# Patient Record
Sex: FEMALE | Race: BLACK OR AFRICAN AMERICAN | NOT HISPANIC OR LATINO | URBAN - METROPOLITAN AREA
[De-identification: names, ages, dates, MRNs, and addresses within clinical notes are randomized per-mention and may not be internally consistent; named-entity substitution may affect disease eponyms.]

---

## 2019-07-26 ENCOUNTER — EMERGENCY (EMERGENCY)
Facility: HOSPITAL | Age: 84
LOS: 0 days | Discharge: HOME | End: 2019-07-26
Attending: EMERGENCY MEDICINE
Payer: COMMERCIAL

## 2019-07-26 VITALS
OXYGEN SATURATION: 100 % | RESPIRATION RATE: 18 BRPM | SYSTOLIC BLOOD PRESSURE: 152 MMHG | HEART RATE: 52 BPM | DIASTOLIC BLOOD PRESSURE: 72 MMHG | WEIGHT: 110.01 LBS | TEMPERATURE: 99 F
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
## 2019-07-26 DIAGNOSIS — S00.93XA CONTUSION OF UNSPECIFIED PART OF HEAD, INITIAL ENCOUNTER: ICD-10-CM
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
## 2019-07-26 DIAGNOSIS — M25.562 PAIN IN LEFT KNEE: ICD-10-CM

## 2019-07-26 DIAGNOSIS — R51 HEADACHE: ICD-10-CM

## 2019-07-26 DIAGNOSIS — S80.02XA CONTUSION OF LEFT KNEE, INITIAL ENCOUNTER: ICD-10-CM

## 2019-07-26 DIAGNOSIS — M19.90 UNSPECIFIED OSTEOARTHRITIS, UNSPECIFIED SITE: ICD-10-CM

## 2019-07-26 PROBLEM — Z00.00 ENCOUNTER FOR PREVENTIVE HEALTH EXAMINATION: Status: ACTIVE | Noted: 2019-07-26

## 2019-07-26 LAB — GLUCOSE BLDC GLUCOMTR-MCNC: 93 MG/DL — SIGNIFICANT CHANGE UP (ref 70–99)

## 2019-07-26 PROCEDURE — 72125 CT NECK SPINE W/O DYE: CPT | Mod: 26

## 2019-07-26 PROCEDURE — 70450 CT HEAD/BRAIN W/O DYE: CPT | Mod: 26

## 2019-07-26 PROCEDURE — 76377 3D RENDER W/INTRP POSTPROCES: CPT | Mod: 26

## 2019-07-26 PROCEDURE — 73562 X-RAY EXAM OF KNEE 3: CPT | Mod: 26,LT

## 2019-07-26 PROCEDURE — 99284 EMERGENCY DEPT VISIT MOD MDM: CPT

## 2019-07-26 RX ORDER — ACETAMINOPHEN 500 MG
650 TABLET ORAL ONCE
Refills: 0 | Status: COMPLETED | OUTPATIENT
Start: 2019-07-26 | End: 2019-07-26

## 2019-07-26 RX ADMIN — Medication 650 MILLIGRAM(S): at 17:35

## 2019-07-26 RX ADMIN — Medication 650 MILLIGRAM(S): at 17:33

## 2019-07-26 NOTE — ED PROVIDER NOTE - OBJECTIVE STATEMENT
85 donna female wih history of athritis presents withleft knee pain and head pain while pushng shopping cart today. Patient denies neck pain, fever, chills, headaches

## 2019-07-26 NOTE — ED PROVIDER NOTE - CHPI ED SYMPTOMS NEG
no deformity/no weakness/no confusion/no loss of consciousness/no fever/no tingling/no bleeding/no numbness/no vomiting

## 2019-07-26 NOTE — ED ADULT TRIAGE NOTE - CHIEF COMPLAINT QUOTE
s/p mechanical fall outside, fell struck her head, AMS after falling, takes aspirin daily , C collar in place, Fall occurred 20 mins ago

## 2019-07-26 NOTE — ED ADULT NURSE NOTE - NSIMPLEMENTINTERV_GEN_ALL_ED
Implemented All Fall with Harm Risk Interventions:  [place] to call system. Call bell, personal items and telephone within reach. Instruct patient to call for assistance. Room bathroom lighting operational. Non-slip footwear when patient is off stretcher. Physically safe environment: no spills, clutter or unnecessary equipment. Stretcher in lowest position, wheels locked, appropriate side rails in place. Provide visual cue, wrist band, yellow gown, etc. Monitor gait and stability. Monitor for mental status changes and reorient to person, place, and time. Review medications for side effects contributing to fall risk. Reinforce activity limits and safety measures with patient and family. Provide visual clues: red socks.

## 2022-07-14 ENCOUNTER — INPATIENT (INPATIENT)
Facility: HOSPITAL | Age: 87
LOS: 12 days | Discharge: NURSING, REHAB OR LONG-TERM CARE | DRG: 312 | End: 2022-07-27
Attending: GENERAL PRACTICE | Admitting: GENERAL PRACTICE
Payer: COMMERCIAL

## 2022-07-14 VITALS
SYSTOLIC BLOOD PRESSURE: 159 MMHG | HEART RATE: 56 BPM | WEIGHT: 110.01 LBS | OXYGEN SATURATION: 93 % | HEIGHT: 65 IN | RESPIRATION RATE: 16 BRPM | TEMPERATURE: 98 F | DIASTOLIC BLOOD PRESSURE: 81 MMHG
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
## 2022-07-14 DIAGNOSIS — I10 ESSENTIAL (PRIMARY) HYPERTENSION: ICD-10-CM

## 2022-07-14 DIAGNOSIS — R26.2 DIFFICULTY IN WALKING, NOT ELSEWHERE CLASSIFIED: ICD-10-CM

## 2022-07-14 DIAGNOSIS — U07.1 COVID-19: ICD-10-CM

## 2022-07-14 DIAGNOSIS — M25.552 PAIN IN LEFT HIP: ICD-10-CM

## 2022-07-14 DIAGNOSIS — H40.9 UNSPECIFIED GLAUCOMA: ICD-10-CM

## 2022-07-14 DIAGNOSIS — M25.562 PAIN IN LEFT KNEE: ICD-10-CM

## 2022-07-14 LAB
ALBUMIN SERPL ELPH-MCNC: 4 G/DL — SIGNIFICANT CHANGE UP (ref 3.3–5)
ALP SERPL-CCNC: 58 U/L — SIGNIFICANT CHANGE UP (ref 40–120)
ALT FLD-CCNC: 7 U/L — LOW (ref 10–45)
ANION GAP SERPL CALC-SCNC: 12 MMOL/L — SIGNIFICANT CHANGE UP (ref 5–17)
APPEARANCE UR: CLEAR — SIGNIFICANT CHANGE UP
APTT BLD: 30.1 SEC — SIGNIFICANT CHANGE UP (ref 27.5–35.5)
AST SERPL-CCNC: 14 U/L — SIGNIFICANT CHANGE UP (ref 10–40)
BACTERIA # UR AUTO: NEGATIVE — SIGNIFICANT CHANGE UP
BASOPHILS # BLD AUTO: 0.05 K/UL — SIGNIFICANT CHANGE UP (ref 0–0.2)
BASOPHILS NFR BLD AUTO: 0.8 % — SIGNIFICANT CHANGE UP (ref 0–2)
BILIRUB SERPL-MCNC: 0.4 MG/DL — SIGNIFICANT CHANGE UP (ref 0.2–1.2)
BILIRUB UR-MCNC: NEGATIVE — SIGNIFICANT CHANGE UP
BLD GP AB SCN SERPL QL: NEGATIVE — SIGNIFICANT CHANGE UP
BUN SERPL-MCNC: 11 MG/DL — SIGNIFICANT CHANGE UP (ref 7–23)
CALCIUM SERPL-MCNC: 10.4 MG/DL — SIGNIFICANT CHANGE UP (ref 8.4–10.5)
CHLORIDE SERPL-SCNC: 98 MMOL/L — SIGNIFICANT CHANGE UP (ref 96–108)
CO2 SERPL-SCNC: 31 MMOL/L — SIGNIFICANT CHANGE UP (ref 22–31)
COLOR SPEC: SIGNIFICANT CHANGE UP
CREAT SERPL-MCNC: 0.87 MG/DL — SIGNIFICANT CHANGE UP (ref 0.5–1.3)
DIFF PNL FLD: NEGATIVE — SIGNIFICANT CHANGE UP
EGFR: 64 ML/MIN/1.73M2 — SIGNIFICANT CHANGE UP
EOSINOPHIL # BLD AUTO: 0.14 K/UL — SIGNIFICANT CHANGE UP (ref 0–0.5)
EOSINOPHIL NFR BLD AUTO: 2.2 % — SIGNIFICANT CHANGE UP (ref 0–6)
EPI CELLS # UR: 0 /HPF — SIGNIFICANT CHANGE UP
GLUCOSE SERPL-MCNC: 104 MG/DL — HIGH (ref 70–99)
GLUCOSE UR QL: NEGATIVE — SIGNIFICANT CHANGE UP
HCT VFR BLD CALC: 45.2 % — HIGH (ref 34.5–45)
HGB BLD-MCNC: 14.2 G/DL — SIGNIFICANT CHANGE UP (ref 11.5–15.5)
IMM GRANULOCYTES NFR BLD AUTO: 0.6 % — SIGNIFICANT CHANGE UP (ref 0–1.5)
INR BLD: 1.04 RATIO — SIGNIFICANT CHANGE UP (ref 0.88–1.16)
KETONES UR-MCNC: NEGATIVE — SIGNIFICANT CHANGE UP
LEUKOCYTE ESTERASE UR-ACNC: NEGATIVE — SIGNIFICANT CHANGE UP
LYMPHOCYTES # BLD AUTO: 1.4 K/UL — SIGNIFICANT CHANGE UP (ref 1–3.3)
LYMPHOCYTES # BLD AUTO: 21.7 % — SIGNIFICANT CHANGE UP (ref 13–44)
MCHC RBC-ENTMCNC: 27.4 PG — SIGNIFICANT CHANGE UP (ref 27–34)
MCHC RBC-ENTMCNC: 31.4 GM/DL — LOW (ref 32–36)
MCV RBC AUTO: 87.1 FL — SIGNIFICANT CHANGE UP (ref 80–100)
MONOCYTES # BLD AUTO: 0.8 K/UL — SIGNIFICANT CHANGE UP (ref 0–0.9)
MONOCYTES NFR BLD AUTO: 12.4 % — SIGNIFICANT CHANGE UP (ref 2–14)
NEUTROPHILS # BLD AUTO: 4.01 K/UL — SIGNIFICANT CHANGE UP (ref 1.8–7.4)
NEUTROPHILS NFR BLD AUTO: 62.3 % — SIGNIFICANT CHANGE UP (ref 43–77)
NITRITE UR-MCNC: NEGATIVE — SIGNIFICANT CHANGE UP
NRBC # BLD: 0 /100 WBCS — SIGNIFICANT CHANGE UP (ref 0–0)
PH UR: 8 — SIGNIFICANT CHANGE UP (ref 5–8)
PLATELET # BLD AUTO: 248 K/UL — SIGNIFICANT CHANGE UP (ref 150–400)
POTASSIUM SERPL-MCNC: 3.9 MMOL/L — SIGNIFICANT CHANGE UP (ref 3.5–5.3)
POTASSIUM SERPL-SCNC: 3.9 MMOL/L — SIGNIFICANT CHANGE UP (ref 3.5–5.3)
PROT SERPL-MCNC: 8 G/DL — SIGNIFICANT CHANGE UP (ref 6–8.3)
PROT UR-MCNC: NEGATIVE — SIGNIFICANT CHANGE UP
PROTHROM AB SERPL-ACNC: 12 SEC — SIGNIFICANT CHANGE UP (ref 10.5–13.4)
RBC # BLD: 5.19 M/UL — SIGNIFICANT CHANGE UP (ref 3.8–5.2)
RBC # FLD: 13.3 % — SIGNIFICANT CHANGE UP (ref 10.3–14.5)
RBC CASTS # UR COMP ASSIST: 1 /HPF — SIGNIFICANT CHANGE UP (ref 0–4)
RH IG SCN BLD-IMP: POSITIVE — SIGNIFICANT CHANGE UP
SARS-COV-2 RNA SPEC QL NAA+PROBE: DETECTED
SODIUM SERPL-SCNC: 141 MMOL/L — SIGNIFICANT CHANGE UP (ref 135–145)
SP GR SPEC: 1.01 — SIGNIFICANT CHANGE UP (ref 1.01–1.02)
UROBILINOGEN FLD QL: NEGATIVE — SIGNIFICANT CHANGE UP
WBC # BLD: 6.44 K/UL — SIGNIFICANT CHANGE UP (ref 3.8–10.5)
WBC # FLD AUTO: 6.44 K/UL — SIGNIFICANT CHANGE UP (ref 3.8–10.5)
WBC UR QL: 0 /HPF — SIGNIFICANT CHANGE UP (ref 0–5)

## 2022-07-14 PROCEDURE — G1004: CPT

## 2022-07-14 PROCEDURE — 71250 CT THORAX DX C-: CPT | Mod: 26,MG

## 2022-07-14 PROCEDURE — 70450 CT HEAD/BRAIN W/O DYE: CPT | Mod: 26,MG

## 2022-07-14 PROCEDURE — 99285 EMERGENCY DEPT VISIT HI MDM: CPT

## 2022-07-14 PROCEDURE — 73562 X-RAY EXAM OF KNEE 3: CPT | Mod: 26,LT

## 2022-07-14 PROCEDURE — 72125 CT NECK SPINE W/O DYE: CPT | Mod: 26,MG

## 2022-07-14 PROCEDURE — 74176 CT ABD & PELVIS W/O CONTRAST: CPT | Mod: 26,MG

## 2022-07-14 RX ORDER — ONDANSETRON 8 MG/1
4 TABLET, FILM COATED ORAL EVERY 8 HOURS
Refills: 0 | Status: DISCONTINUED | OUTPATIENT
Start: 2022-07-14 | End: 2022-07-27

## 2022-07-14 RX ORDER — ACETAMINOPHEN 500 MG
650 TABLET ORAL EVERY 6 HOURS
Refills: 0 | Status: DISCONTINUED | OUTPATIENT
Start: 2022-07-14 | End: 2022-07-27

## 2022-07-14 RX ORDER — LISINOPRIL 2.5 MG/1
40 TABLET ORAL DAILY
Refills: 0 | Status: DISCONTINUED | OUTPATIENT
Start: 2022-07-14 | End: 2022-07-27

## 2022-07-14 RX ORDER — DORZOLAMIDE HYDROCHLORIDE TIMOLOL MALEATE 20; 5 MG/ML; MG/ML
1 SOLUTION/ DROPS OPHTHALMIC
Refills: 0 | Status: DISCONTINUED | OUTPATIENT
Start: 2022-07-14 | End: 2022-07-27

## 2022-07-14 RX ORDER — MULTIVIT-MIN/FERROUS GLUCONATE 9 MG/15 ML
1 LIQUID (ML) ORAL DAILY
Refills: 0 | Status: DISCONTINUED | OUTPATIENT
Start: 2022-07-14 | End: 2022-07-27

## 2022-07-14 RX ORDER — LANOLIN ALCOHOL/MO/W.PET/CERES
3 CREAM (GRAM) TOPICAL AT BEDTIME
Refills: 0 | Status: DISCONTINUED | OUTPATIENT
Start: 2022-07-14 | End: 2022-07-27

## 2022-07-14 RX ORDER — LATANOPROST 0.05 MG/ML
1 SOLUTION/ DROPS OPHTHALMIC; TOPICAL AT BEDTIME
Refills: 0 | Status: DISCONTINUED | OUTPATIENT
Start: 2022-07-14 | End: 2022-07-27

## 2022-07-14 RX ORDER — AMLODIPINE BESYLATE 2.5 MG/1
10 TABLET ORAL DAILY
Refills: 0 | Status: DISCONTINUED | OUTPATIENT
Start: 2022-07-14 | End: 2022-07-27

## 2022-07-14 RX ORDER — SIMVASTATIN 20 MG/1
10 TABLET, FILM COATED ORAL AT BEDTIME
Refills: 0 | Status: DISCONTINUED | OUTPATIENT
Start: 2022-07-14 | End: 2022-07-27

## 2022-07-14 RX ORDER — BRIMONIDINE TARTRATE 2 MG/MG
1 SOLUTION/ DROPS OPHTHALMIC THREE TIMES A DAY
Refills: 0 | Status: DISCONTINUED | OUTPATIENT
Start: 2022-07-14 | End: 2022-07-27

## 2022-07-14 RX ORDER — MECLIZINE HCL 12.5 MG
25 TABLET ORAL ONCE
Refills: 0 | Status: COMPLETED | OUTPATIENT
Start: 2022-07-14 | End: 2022-07-14

## 2022-07-14 RX ORDER — LEVOTHYROXINE SODIUM 125 MCG
112 TABLET ORAL DAILY
Refills: 0 | Status: DISCONTINUED | OUTPATIENT
Start: 2022-07-14 | End: 2022-07-27

## 2022-07-14 RX ORDER — ACETAMINOPHEN 500 MG
975 TABLET ORAL ONCE
Refills: 0 | Status: COMPLETED | OUTPATIENT
Start: 2022-07-14 | End: 2022-07-14

## 2022-07-14 RX ADMIN — Medication 975 MILLIGRAM(S): at 18:52

## 2022-07-14 RX ADMIN — Medication 25 MILLIGRAM(S): at 18:52

## 2022-07-14 NOTE — H&P ADULT - PROBLEM SELECTOR PLAN 1
appears to be mechanical fall . imbalance  pt reportedly had some dizziness post >> ? seems resolved   no signs of fracture on imaging  PT eval  fall precautions  check orthostatics

## 2022-07-14 NOTE — ED PROVIDER NOTE - OBJECTIVE STATEMENT
88f pmh HTN, HLD, Hypothyroidism. Glaucoma, osteoarthritis presents to ed after mechanical trip and fall getting out of shower, pt denies loc denies syncope denies symptoms prior to slipping however after falling pt states she was dizzy, unable to get up for approx 20-30 minutes before ems arrived, pt unable to ambulates after falling not on ac.

## 2022-07-14 NOTE — ED ADULT NURSE REASSESSMENT NOTE - NS ED NURSE REASSESS COMMENT FT1
report taken from RN Galilea, pt assessed, vitals stable, no complaints at this time, daughter at bedside pending floor bed

## 2022-07-14 NOTE — ED PROVIDER NOTE - ATTENDING CONTRIBUTION TO CARE
Private Physician Vladislav Ramey [place], [facility]  88y female pmh HTN, HLD, Hypothyroidism. Glaucoma, osteoarthritis. Pt comes to ed c/o mechanical trip fall, Pt was walking w cane and was reaching for walker and fell. No loc, pt was not able to get up after fall. Pt c/o pain Private Physician Vladislav Ramey [place], [place]  88y female pmh HTN, HLD, Hypothyroidism. Glaucoma, osteoarthritis. Pt comes to ed c/o mechanical trip fall, Pt was walking w cane and was reaching for walker and fell. No loc, pt was not able to get up after fall. Pt has c/o chronic pain left knee. Heent normocephalic atraumatic neck c-collar, +occipial ttp, no sterling racoons, Chest clear anterior & posterior cv no rubs, gallops or murmurs abd soft +bs no mass guarding Neruo gcs 15 speech fluent moves all extr, MSK left knee pain on flexion  Hermes Henderson MD, Facep

## 2022-07-14 NOTE — ED PROVIDER NOTE - PHYSICAL EXAMINATION
CONSTITUTIONAL: nad  SKIN: 3cm hematoma w/o laceration or abrasion over R posterior scalp  HEAD: NCAT  EYES: EOMI, PERRLA, no scleral icterus, conjunctiva pink  ENT: normal pharynx with no erythema or exudates, dry  NECK: Supple; non tender. Full ROM. in c-collar  CARD: RRR, no murmurs.  RESP: Clear to ausculation b/l. No crackles or wheezing.  ABD: soft, non-tender, non-distended, no rebound or guarding.  MSK: L hip asymmetric vs R, Full ROM w/ nelida eL knee pain, no bony tenderness, no pedal edema, no calf tenderness  NEURO: normal motor. normal sensory.   PSYCH: Cooperative, appropriate.

## 2022-07-14 NOTE — H&P ADULT - PROBLEM SELECTOR PLAN 3
pt with recent positive in home test   pt not symptomatic ( has chronic on and off cough)  no additional testing / management needed  monitor  supportive care  nutrition, hydration  vitamins     infection control eval in AM for possible DC of isolation ?

## 2022-07-14 NOTE — H&P ADULT - NSHPADDITIONALINFOADULT_GEN_ALL_CORE
Dr. RYNE Thompson ([phone]) Dr. RYNE Thompson ([phone])  goals of care, CPR... discussed briefly, pt not sure at this time >> will follow at later time

## 2022-07-14 NOTE — H&P ADULT - PROBLEM SELECTOR PLAN 2
possible contusion post fall  if not better / unable to walk, may need additional imaging to rule out fracture  PT eval  pain mgmt as needed

## 2022-07-14 NOTE — ED PROVIDER NOTE - CLINICAL SUMMARY MEDICAL DECISION MAKING FREE TEXT BOX
mechanical fall not on ac but w/ head trauma r/o intracranial bleed w/ ct, r/o infectious precipitant causing weakness, r/o hip fracture w/ non-con CT head neck chest pelvis, likely admission if unable to ambulate

## 2022-07-14 NOTE — ED ADULT NURSE NOTE - OBJECTIVE STATEMENT
88 yr old female to ED via EMS from home s/p fall.  Neck collar on Denies neck pain Log rolled Denies back pain No obvious inj or sacral redness or breakdown Hematoma rt occipital area . Denies LOC Pt states" I was coming out of the BR and fell Has Life call device and EMS came x 30 mins later." Pt awake alert and orientedx4 Resp even and nonlab Denies chest pain or sob Coughing Covid pos x3 weeks ago Afebrile Denies abd pain Denies N/V C/o dizziness after falling BEFAST neg Denies numbness or weakness to extremities No facial droop or slurred speech GCS 15. Has HX HTN, HLD, Hypothyroid and Glaucoma. Responds approp to verbal and tactile stimuli Poor hygiene Lives with Grandaughter.

## 2022-07-14 NOTE — H&P ADULT - HISTORY OF PRESENT ILLNESS
>>>>>>Medical Attending Initial / Admission note<<<<<<  -------------------------------------------------------------------------------  CHIEF COMPLAINT & HISTORY OF PRESENT ILLNESS:      Patient is an 87 yo woman with past medical history of HTN, HLD, Hypothyroidism. Glaucoma, osteoarthritis presents to ed after mechanical trip and fall getting out of bathroom.  pt states she lives by herself but her grand children live with her.. she woke u p this morning, went to bathroom , voided and was going back to bedroom when she may have tripped, twisted and fell on the floor and could not get up..  pt denies loc denies syncope denies symptoms prior to slipping however after falling pt states she was dizzy, unable to get up for approx 20-30 minutes before ems arrived ( used 'life alert' ), pt unable to ambulates after falling due to pain in left leg  pt not dizzy at time of exam and c/o soreness to left arm and leg, states hurts when trying to walk on left leg  no fractures found  + small hematoma on Rt post scalp..  pt normally walks with bennett at home and walker outside the home     per report pt tested positive for covid on an in-home test last week  COVID-19 vaccine series completed w booster,  covid test positive here.. pt asymptomatic (except for chronic  ?smokers cough)     --------------------------------------------------------------------------------  PAST MEDICAL HISTORY:    HTN (hypertension)  Hypothyroidism  HLD (hyperlipidemia)  OA  Glaucoma    --------------------------------------------------------------------------------  PAST SURGICAL HISTORY:    denies     --------------------------------------------------------------------------------  FAMILY HISTORY:    Mother: pancreatic cancer  father: MI    --------------------------------------------------------------------------------  SOCIAL HISTORY:  Alcohol: None reported  Smoking: None reported     --------------------------------------------------------------------------------  ALLERGIES:    [As bellow, reviewed]    --------------------------------------------------------------------------------  MEDICATIONS:   [As geoffrey, reviewed]    --------------------------------------------------------------------------------  REVIEW OF SYSTEM:    GEN: no fever, no chills, as above   RESP: no SOB, occasional cough, no sputum  CVS: no chest pain, no palpitations, no edema  GI: no abdominal pain, no nausea, no vomiting  : no dysuria, no frequency  Neuro: no headache, no dizziness  PSYCH: no anxiety, no depression  Derm : no itching, no rash    --------------------------------------------------------------------------------  VITAL SIGNS:  Height (cm): 165.1  Weight (kg): 49.9  BMI (kg/m2): 18.3  Vital Signs Last 24 HrsT(C): 36.8 (07-14-22 @ 19:36)  T(F): 98.3 (07-14-22 @ 19:36), Max: 98.3 (07-14-22 @ 19:36)  HR: 52 (07-14-22 @ 19:36) (52 - 59)  BP: 146/64 (07-14-22 @ 19:36)  RR: 18 (07-14-22 @ 19:36) (16 - 18)  SpO2: 98% (07-14-22 @ 19:36) (93% - 98%)      --------------------------------------------------------------------------------    PHYSICAL EXAM:    GEN: A&O X 3 , NAD , comfortable, pleasant, calm, cachectic   HEENT: NCAT, PERRL, MMM, hearing intact  Neck: supple , no JVD  CVS: S1S2 , regular , No M/R/G appreciated  PULM: CTA B/L,  no W/R/R appreciated  ABD.: soft. non tender, non distended,  bowel sounds present  Extrem: intact pulses , no edema   Derm: No rash , no ecchymoses, dry skin   PSYCH : normal mood,  no delusion not anxious    --------------------------------------------------------------------------------  LAB AND IMAGING:   [As bellow, reviewed]    --------------------------------------------------------------------------------  ASSESSMENT AND PLAN:   [As bellow]    --------------------  Case discussed with SHY arevalo.   ___________________________  RYNE Thompson D.O.  Pager: [phone]  07-14-22   >>>>>>Medical Attending Initial / Admission note<<<<<<  -------------------------------------------------------------------------------  CHIEF COMPLAINT & HISTORY OF PRESENT ILLNESS:      Patient is an 87 yo woman with past medical history of HTN, HLD, Hypothyroidism. Glaucoma, osteoarthritis presents to ed after mechanical trip and fall getting out of bathroom.  pt states she lives by herself but her grand children live with her.. she woke u p this morning, went to bathroom , voided and was going back to bedroom when she may have tripped, twisted and fell on the floor and could not get up..  pt denies loc denies syncope denies symptoms prior to slipping however after falling pt states she was dizzy, unable to get up for approx 20-30 minutes before ems arrived ( used 'life alert' ), pt unable to ambulates after falling due to pain in left leg  pt not dizzy at time of exam and c/o soreness to left arm and leg, states hurts when trying to walk on left leg  no fractures found  + small hematoma on Rt post scalp..  pt normally walks with bennett at home and walker outside the home     per report pt tested positive for covid on an in-home test recently  COVID-19 vaccine series completed w booster,  covid test positive here.. pt asymptomatic (except for chronic  ?smokers cough)     --------------------------------------------------------------------------------  PAST MEDICAL HISTORY:    HTN (hypertension)  Hypothyroidism  HLD (hyperlipidemia)  OA  Glaucoma    --------------------------------------------------------------------------------  PAST SURGICAL HISTORY:    denies     --------------------------------------------------------------------------------  FAMILY HISTORY:    Mother: pancreatic cancer  father: MI    --------------------------------------------------------------------------------  SOCIAL HISTORY:  Alcohol: None reported  Smoking:  ++ past smoker     --------------------------------------------------------------------------------  ALLERGIES:    [As bellow, reviewed]    --------------------------------------------------------------------------------  MEDICATIONS:   [As bellow, reviewed]    --------------------------------------------------------------------------------  REVIEW OF SYSTEM:    GEN: no fever, no chills, as above   RESP: no SOB, occasional cough, no sputum  CVS: no chest pain, no palpitations, no edema  GI: no abdominal pain, no nausea, no vomiting  : no dysuria, no frequency  Neuro: no headache, no dizziness  PSYCH: no anxiety, no depression  Derm : no itching, no rash    --------------------------------------------------------------------------------  VITAL SIGNS:  Height (cm): 165.1  Weight (kg): 49.9  BMI (kg/m2): 18.3  Vital Signs Last 24 HrsT(C): 36.8 (07-14-22 @ 19:36)  T(F): 98.3 (07-14-22 @ 19:36), Max: 98.3 (07-14-22 @ 19:36)  HR: 52 (07-14-22 @ 19:36) (52 - 59)  BP: 146/64 (07-14-22 @ 19:36)  RR: 18 (07-14-22 @ 19:36) (16 - 18)  SpO2: 98% (07-14-22 @ 19:36) (93% - 98%)      --------------------------------------------------------------------------------    PHYSICAL EXAM:    GEN: A&O X 3 , NAD , comfortable, pleasant, calm, cachectic   HEENT: NCAT, PERRL, MMM, hearing intact  Neck: supple , no JVD  CVS: S1S2 , regular , No M/R/G appreciated  PULM: CTA B/L,  no W/R/R appreciated  ABD.: soft. non tender, non distended,  bowel sounds present  Extrem: intact pulses , no edema   Derm: No rash , no ecchymoses, dry skin   PSYCH : normal mood,  no delusion not anxious    --------------------------------------------------------------------------------  LAB AND IMAGING:   [As bellow, reviewed]    --------------------------------------------------------------------------------  ASSESSMENT AND PLAN:   [As bellow]    --------------------  Case discussed with SHY arevalo.   ___________________________  RYNE Thompson D.O.  Pager: [phone]  07-14-22

## 2022-07-14 NOTE — ED PROVIDER NOTE - PROGRESS NOTE DETAILS
Endorsed to Dr Jacquelyn Henderson MD, Facep Osvaldo Pierce, DO PGY-2: No acute fractures on CT, will clear C-collar w/ no neck pain, r/o infectious precipitant admit to hospital for inability ot ambulate

## 2022-07-14 NOTE — ED PROVIDER NOTE - NSICDXPASTMEDICALHX_GEN_ALL_CORE_FT
PAST MEDICAL HISTORY:  COVID-19 vaccine series completed w booster,    HLD (hyperlipidemia)     HTN (hypertension)     Hypothyroidism

## 2022-07-14 NOTE — ED PROVIDER NOTE - NS ED ROS FT
Constitutional:  (-) fever, (-) chills, (-) lethargy  Eyes:  (-) eye pain (-) visual changes  ENMT: (-) nasal discharge, (-) sore throat. (-) neck pain or stiffness  Cardiac: (-) chest pain (-) palpitations  Respiratory:  (-) cough (-) respiratory distress.   GI:  (-) nausea (-) vomiting (-) diarrhea (-) abdominal pain.  :  (-) dysuria (-) frequency (-) burning.  MS:  (-) back pain (-) joint pain.  Neuro:  (-) headache (-) numbness (-) tingling (-) focal weakness +dizziness  Skin:  (-) rash  Except as documented in the HPI,  all other systems are negative

## 2022-07-15 LAB
ALBUMIN SERPL ELPH-MCNC: 3.7 G/DL — SIGNIFICANT CHANGE UP (ref 3.3–5)
ALP SERPL-CCNC: 61 U/L — SIGNIFICANT CHANGE UP (ref 40–120)
ALT FLD-CCNC: 7 U/L — LOW (ref 10–45)
ANION GAP SERPL CALC-SCNC: 13 MMOL/L — SIGNIFICANT CHANGE UP (ref 5–17)
AST SERPL-CCNC: 11 U/L — SIGNIFICANT CHANGE UP (ref 10–40)
BILIRUB SERPL-MCNC: 0.4 MG/DL — SIGNIFICANT CHANGE UP (ref 0.2–1.2)
BUN SERPL-MCNC: 10 MG/DL — SIGNIFICANT CHANGE UP (ref 7–23)
CALCIUM SERPL-MCNC: 10.3 MG/DL — SIGNIFICANT CHANGE UP (ref 8.4–10.5)
CHLORIDE SERPL-SCNC: 99 MMOL/L — SIGNIFICANT CHANGE UP (ref 96–108)
CHOLEST SERPL-MCNC: 193 MG/DL — SIGNIFICANT CHANGE UP
CO2 SERPL-SCNC: 28 MMOL/L — SIGNIFICANT CHANGE UP (ref 22–31)
CREAT SERPL-MCNC: 0.76 MG/DL — SIGNIFICANT CHANGE UP (ref 0.5–1.3)
D DIMER BLD IA.RAPID-MCNC: 688 NG/ML DDU — HIGH
EGFR: 75 ML/MIN/1.73M2 — SIGNIFICANT CHANGE UP
GLUCOSE SERPL-MCNC: 159 MG/DL — HIGH (ref 70–99)
HCT VFR BLD CALC: 45.6 % — HIGH (ref 34.5–45)
HDLC SERPL-MCNC: 45 MG/DL — LOW
HGB BLD-MCNC: 14.8 G/DL — SIGNIFICANT CHANGE UP (ref 11.5–15.5)
LIPID PNL WITH DIRECT LDL SERPL: 130 MG/DL — HIGH
MCHC RBC-ENTMCNC: 27.3 PG — SIGNIFICANT CHANGE UP (ref 27–34)
MCHC RBC-ENTMCNC: 32.5 GM/DL — SIGNIFICANT CHANGE UP (ref 32–36)
MCV RBC AUTO: 84 FL — SIGNIFICANT CHANGE UP (ref 80–100)
NON HDL CHOLESTEROL: 149 MG/DL — HIGH
NRBC # BLD: 0 /100 WBCS — SIGNIFICANT CHANGE UP (ref 0–0)
PLATELET # BLD AUTO: 284 K/UL — SIGNIFICANT CHANGE UP (ref 150–400)
POTASSIUM SERPL-MCNC: 3.2 MMOL/L — LOW (ref 3.5–5.3)
POTASSIUM SERPL-SCNC: 3.2 MMOL/L — LOW (ref 3.5–5.3)
PROT SERPL-MCNC: 7.8 G/DL — SIGNIFICANT CHANGE UP (ref 6–8.3)
RBC # BLD: 5.43 M/UL — HIGH (ref 3.8–5.2)
RBC # FLD: 13.2 % — SIGNIFICANT CHANGE UP (ref 10.3–14.5)
SODIUM SERPL-SCNC: 140 MMOL/L — SIGNIFICANT CHANGE UP (ref 135–145)
TRIGL SERPL-MCNC: 92 MG/DL — SIGNIFICANT CHANGE UP
WBC # BLD: 6.23 K/UL — SIGNIFICANT CHANGE UP (ref 3.8–10.5)
WBC # FLD AUTO: 6.23 K/UL — SIGNIFICANT CHANGE UP (ref 3.8–10.5)

## 2022-07-15 PROCEDURE — 93010 ELECTROCARDIOGRAM REPORT: CPT

## 2022-07-15 RX ORDER — ENOXAPARIN SODIUM 100 MG/ML
40 INJECTION SUBCUTANEOUS EVERY 24 HOURS
Refills: 0 | Status: DISCONTINUED | OUTPATIENT
Start: 2022-07-15 | End: 2022-07-22

## 2022-07-15 RX ORDER — SODIUM CHLORIDE 9 MG/ML
1000 INJECTION INTRAMUSCULAR; INTRAVENOUS; SUBCUTANEOUS
Refills: 0 | Status: DISCONTINUED | OUTPATIENT
Start: 2022-07-15 | End: 2022-07-27

## 2022-07-15 RX ORDER — FAMOTIDINE 10 MG/ML
20 INJECTION INTRAVENOUS DAILY
Refills: 0 | Status: DISCONTINUED | OUTPATIENT
Start: 2022-07-15 | End: 2022-07-23

## 2022-07-15 RX ORDER — POTASSIUM CHLORIDE 20 MEQ
40 PACKET (EA) ORAL ONCE
Refills: 0 | Status: COMPLETED | OUTPATIENT
Start: 2022-07-15 | End: 2022-07-15

## 2022-07-15 RX ADMIN — Medication 1 TABLET(S): at 12:28

## 2022-07-15 RX ADMIN — SODIUM CHLORIDE 60 MILLILITER(S): 9 INJECTION INTRAMUSCULAR; INTRAVENOUS; SUBCUTANEOUS at 18:04

## 2022-07-15 RX ADMIN — BRIMONIDINE TARTRATE 1 DROP(S): 2 SOLUTION/ DROPS OPHTHALMIC at 12:30

## 2022-07-15 RX ADMIN — BRIMONIDINE TARTRATE 1 DROP(S): 2 SOLUTION/ DROPS OPHTHALMIC at 22:09

## 2022-07-15 RX ADMIN — AMLODIPINE BESYLATE 10 MILLIGRAM(S): 2.5 TABLET ORAL at 06:22

## 2022-07-15 RX ADMIN — ENOXAPARIN SODIUM 40 MILLIGRAM(S): 100 INJECTION SUBCUTANEOUS at 18:04

## 2022-07-15 RX ADMIN — LISINOPRIL 40 MILLIGRAM(S): 2.5 TABLET ORAL at 06:22

## 2022-07-15 RX ADMIN — DORZOLAMIDE HYDROCHLORIDE TIMOLOL MALEATE 1 DROP(S): 20; 5 SOLUTION/ DROPS OPHTHALMIC at 12:30

## 2022-07-15 RX ADMIN — Medication 40 MILLIEQUIVALENT(S): at 12:29

## 2022-07-15 RX ADMIN — DORZOLAMIDE HYDROCHLORIDE TIMOLOL MALEATE 1 DROP(S): 20; 5 SOLUTION/ DROPS OPHTHALMIC at 22:09

## 2022-07-15 RX ADMIN — LATANOPROST 1 DROP(S): 0.05 SOLUTION/ DROPS OPHTHALMIC; TOPICAL at 22:09

## 2022-07-15 RX ADMIN — SIMVASTATIN 10 MILLIGRAM(S): 20 TABLET, FILM COATED ORAL at 22:08

## 2022-07-15 RX ADMIN — Medication 112 MICROGRAM(S): at 06:22

## 2022-07-15 RX ADMIN — BRIMONIDINE TARTRATE 1 DROP(S): 2 SOLUTION/ DROPS OPHTHALMIC at 06:22

## 2022-07-15 NOTE — CONSULT NOTE ADULT - ASSESSMENT
Patient is an 89 yo woman with past medical history of HTN, HLD, Hypothyroidism. Glaucoma, osteoarthritis presents to ed after mechanical trip and fall getting out of bathroom.  pt states she lives by herself but her grand children live with her.. she woke u p this morning, went to bathroom , voided and was going back to bedroom when she may have tripped, twisted and fell on the floor and could not get up..  per report pt tested positive for covid on an in-home test recently  COVID-19 vaccine series completed w booster,  covid test positive here.. pt asymptomatic (except for chronic  ?smokers cough  pt with sig hx of htn hx sound like vasovagal syncope  check orthostatic  continue bp meds for now  tsh/lipid  awaiting ecg  echo  dvt prophylaxis

## 2022-07-15 NOTE — PHYSICAL THERAPY INITIAL EVALUATION ADULT - PERTINENT HX OF CURRENT PROBLEM, REHAB EVAL
87 yo F with PMH: HTN, HLD, Hypothyroidism. Glaucoma, OA presents to ed after mechanical trip and fall getting out of bathroom. After fall, pt states she was dizzy, unable to get up for approx 20-30 minutes before ems arrived (used life alert) and unable to ambulate after fall due to pain in L LE. In ED, pt c/o soreness to L arm and leg, + small hematoma on Rt post scalp.

## 2022-07-15 NOTE — PHYSICAL THERAPY INITIAL EVALUATION ADULT - ASR EQUIP NEEDS DISCH PT EVAL
pt has RW and cane pt has Rollator and cane, Pt would benefit from 3:1 commode, transport wheel chair and shower chair.

## 2022-07-15 NOTE — PROGRESS NOTE ADULT - ASSESSMENT
_________________________________________________________________________________________  ========>>  M E D I C A L   A T T E N D I N G    F O L L O W  U P  N O T E  <<=========  -----------------------------------------------------------------------------------------------------    - Patient seen and examined by me earlier today.   - In summary,  ROMULO KOHLI is a 88y year old woman admitted post fall, pain, COVID   - Patient today overall doing ok, comfortable, eating OK. overall otherwise doing better     ==================>> REVIEW OF SYSTEM <<=================    GEN: no fever, no chills, pain in left arm and leg improved   RESP: no SOB, no cough, no sputum  CVS: no chest pain, no palpitations, no edema  GI: no abdominal pain, no nausea, no constipation, no diarrhea  : no dysuria, no frequency  Neuro: no headache, ++ dizziness with movement   Derm : no itching, no rash    ==================>> PHYSICAL EXAM <<=================    GEN: A&O X 3 , NAD , comfortable, pleasant, calm in bed, cachectic    HEENT: NCAT, PERRL, MMM, hearing intact  Neck: supple , no JVD appreciated  CVS: S1S2 , regular , No M/R/G appreciated  PULM: CTA B/L,  no W/R/R appreciated  ABD.: soft. non tender, non distended,  bowel sounds present  Extrem: intact pulses , no edema   PSYCH : normal mood,  not anxious                            ( Note Written / Date of service :  07-15-22 )    ==================>> MEDICATIONS <<====================    MEDICATIONS  (STANDING):  amLODIPine   Tablet 10 milliGRAM(s) Oral daily  brimonidine 0.2% Ophthalmic Solution 1 Drop(s) Both EYES three times a day  dorzolamide 2%/timolol 0.5% Ophthalmic Solution 1 Drop(s) Both EYES two times a day  latanoprost 0.005% Ophthalmic Solution 1 Drop(s) Both EYES at bedtime  levothyroxine 112 MICROGram(s) Oral daily  lisinopril 40 milliGRAM(s) Oral daily  multivitamin/minerals 1 Tablet(s) Oral daily  simvastatin 10 milliGRAM(s) Oral at bedtime    MEDICATIONS  (PRN):  acetaminophen     Tablet .. 650 milliGRAM(s) Oral every 6 hours PRN Temp greater or equal to 38C (100.4F), Mild Pain (1 - 3)  aluminum hydroxide/magnesium hydroxide/simethicone Suspension 30 milliLiter(s) Oral every 4 hours PRN Dyspepsia  melatonin 3 milliGRAM(s) Oral at bedtime PRN Insomnia  ondansetron Injectable 4 milliGRAM(s) IV Push every 8 hours PRN Nausea and/or Vomiting    ___________  Active diet:  Diet, Regular:   Supplement Feeding Modality:  Oral  Ensure Max Cans or Servings Per Day:  2       Frequency:  Two Times a day  ___________________    ==================>> VITAL SIGNS <<==================    T(C): 36.6 (07-15-22 @ 13:33), Max: 37 (07-15-22 @ 00:31)  HR: 82 (07-15-22 @ 13:33) (52 - 85)  BP: 144/67 (07-15-22 @ 13:33) (131/72 - 156/78)  RR: 16 (07-15-22 @ 13:33) (16 - 18)  SpO2: 99% (07-15-22 @ 13:33) (98% - 100%)     I&O's Summary    2022 07:01  -  15 Jul 2022 07:00  --------------------------------------------------------  IN: 0 mL / OUT: 250 mL / NET: -250 mL         ==================>> LAB AND IMAGING <<==================                        14.8   6.23  )-----------( 284      ( 15 Jul 2022 10:28 )             45.6        07-15    140  |  99  |  10  ----------------------------<  159<H>  3.2<L>   |  28  |  0.76    Ca    10.3      15 Jul 2022 10:28    TPro  7.8  /  Alb  3.7  /  TBili  0.4  /  DBili  x   /  AST  11  /  ALT  7<L>  /  AlkPhos  61  07-15    PT/INR - ( 2022 15:19 )   PT: 12.0 sec;   INR: 1.04 ratio    PTT - ( 2022 15:19 )  PTT:30.1 sec              Urinalysis Basic - ( 2022 17:31 )  Color: Light Yellow / Appearance: Clear / S.010 / pH: x  Gluc: x / Ketone: Negative  / Bili: Negative / Urobili: Negative   Blood: x / Protein: Negative / Nitrite: Negative   Leuk Esterase: Negative / RBC: 1 /hpf / WBC 0 /HPF   Sq Epi: x / Non Sq Epi: 0 /hpf / Bacteria: Negative    Lipid profile:  (07-15-22)     Total: 193     LDL  : (p)     HDL  :45     TG   :92     ___________________________________________________________________________________  ===============>>  A S S E S S M E N T   A N D   P L A N <<===============  ------------------------------------------------------------------------------------------    · Assessment	  Patient is an 87 yo woman with past medical history of HTN, HLD, Hypothyroidism. Glaucoma, osteoarthritis presents to ed after mechanical trip and fall getting out of bathroom.  pt lives by herself but her grand children live with her.. she woke u p this morning, went to bathroom , voided and was going back to bedroom when she may have tripped, twisted and fell on the floor and could not get up..  pt was unable to ambulates after falling due to pain in left leg  no fractures found  + small hematoma on Rt post scalp..  pt normally walks with bennett at home and walker outside the home     per report pt tested positive for covid on an in-home test recently   COVID-19 vaccine series completed w booster,  covid test positive here.. pt asymptomatic (except for chronic  ?smokers cough)       Problem/Plan - 1:  ·  Problem: Fall.   ·  Plan: appears to be mechanical fall . imbalance  pt reportedly had some dizziness on and off with movement ( educated pt to ask for meclizine as needed. and keep well hydrated..   no signs of fracture on imaging  PT eval in process   fall precautions  orthostatics positive  >> KARTIK stockings   encourage po intake and hydration    Problem/Plan - 2:  ·  Problem: Pain of left knee and lower leg.   ·  Plan: possible contusion post fall  if not better / unable to walk, may need additional imaging to rule out fracture  PT eval  pain mgmt as needed.    Problem/Plan - 3:  ·  Problem: 2019 novel coronavirus disease (COVID-19).   ·  Plan: pt with recent positive in home test   pt not symptomatic ( has chronic on and off cough)  no additional testing / management needed  monitor  supportive care  nutrition, hydration  vitamins   infection control eval for possible DC of isolation ?    Problem/Plan - 4:  ·  Problem: Ambulatory dysfunction.   ·  Plan: as above  PT.    Problem/Plan - 5:  ·  Problem: HTN (hypertension).   ·  Plan: hold HCTZ  Continue Current medications otherwise and monitor.  cardio to eval.    Problem/Plan - 6:  ·  Problem: Glaucoma.   ·  Plan: on multiple eye drops  Continue home medications.    -GI/DVT Prophylaxis per protocol.   lovenox   pepcid     --------------------------------------------  Case discussed with pt..   Education given on findings and plan of care  ___________________________  H. GATITO Thompson.  Pager: [phone]

## 2022-07-15 NOTE — PATIENT PROFILE ADULT - FALL HARM RISK - RISK INTERVENTIONS

## 2022-07-15 NOTE — PROVIDER CONTACT NOTE (OTHER) - RECOMMENDATIONS
Patient tested positive for COVID 3 weeks ago at home. Patient does not require isolation at this time. Isolation discontinued as per hospital guidelines.

## 2022-07-15 NOTE — PATIENT PROFILE ADULT - NSPROGENPREVTRANSF_GEN_A_NUR
Health Maintenance Due   Topic Date Due   • Diabetes Eye Exam  03/19/1993   • Diabetes Foot Exam  03/19/1993   • Pneumococcal 19-64 Medium Risk (1 of 1 - PPSV23) 03/19/1994   • Influenza Vaccine (1) 08/01/2018       Patient is due for topics as listed above but is not proceeding with Immunization(s) Influenza and Pneumococcal, Diabetes Eye Exam and Diabetes Foot Exam at this time.            no

## 2022-07-15 NOTE — PHYSICAL THERAPY INITIAL EVALUATION ADULT - ADDITIONAL COMMENTS
Pt amb with cane in house and RW outdoors. Pt lives with daughter and grandchildren in an apartment with + elevator. Pt amb with cane in house and RW outdoors.

## 2022-07-15 NOTE — PHYSICAL THERAPY INITIAL EVALUATION ADULT - PRECAUTIONS/LIMITATIONS, REHAB EVAL
Per report, pt tested positive for covid on an in-home test recently COVID-19 vaccine series completed w booster; covid test positive here. Pt asymptomatic (except for chronic  ?smokers cough). CT HEAD: No acute abnormality. Chronic changes. CT CERVICAL SPINE: No acute abnormality. Chronic changes.XRay L knee: No fxs dislocations or joint effusion.Faint intra-articular medial tibiofemoralchondrocalcinosis. 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 No joint margin erosions.Generalized osteopenia otherwise no discrete lytic or blastic lesions. Scant scattered vascular calcifications. Per report, pt tested positive for covid on an in-home test recently COVID-19 vaccine series completed w booster; covid test positive here. Pt asymptomatic (except for chronic  ?smokers cough). CT HEAD: No acute abnormality. Chronic changes. CT CERVICAL SPINE: No acute abnormality. Chronic changes.XRay L knee: No fxs dislocations or joint effusion.Faint intra-articular medial tibiofemoralchondrocalcinosis. 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 No joint margin erosions.Generalized osteopenia otherwise no discrete lytic or blastic lesions. Scant scattered vascular calcifications./no known precautions/limitations

## 2022-07-15 NOTE — CONSULT NOTE ADULT - SUBJECTIVE AND OBJECTIVE BOX
CHIEF COMPLAINT:Patient is a 88y old  Female who presents with a chief complaint of fall at home (14 Jul 2022 21:09)      HPI:  Patient is an 87 yo woman with past medical history of HTN, HLD, Hypothyroidism. Glaucoma, osteoarthritis presents to ed after mechanical trip and fall getting out of bathroom.  pt states she lives by herself but her grand children live with her.. she woke u p this morning, went to bathroom , voided and was going back to bedroom when she may have tripped, twisted and fell on the floor and could not get up..  per report pt tested positive for covid on an in-home test recently  COVID-19 vaccine series completed w booster,  covid test positive here.. pt asymptomatic (except for chronic  ?smokers cough)     PAST MEDICAL & SURGICAL HISTORY:  HTN (hypertension)  Hypothyroidism  COVID-19 vaccine series completed  w booster,  HLD (hyperlipidemia)    MEDICATIONS  (STANDING):  amLODIPine   Tablet 10 milliGRAM(s) Oral daily  brimonidine 0.2% Ophthalmic Solution 1 Drop(s) Both EYES three times a day  dorzolamide 2%/timolol 0.5% Ophthalmic Solution 1 Drop(s) Both EYES two times a day  latanoprost 0.005% Ophthalmic Solution 1 Drop(s) Both EYES at bedtime  levothyroxine 112 MICROGram(s) Oral daily  lisinopril 40 milliGRAM(s) Oral daily  multivitamin/minerals 1 Tablet(s) Oral daily  simvastatin 10 milliGRAM(s) Oral at bedtime    MEDICATIONS  (PRN):  acetaminophen     Tablet .. 650 milliGRAM(s) Oral every 6 hours PRN Temp greater or equal to 38C (100.4F), Mild Pain (1 - 3)  aluminum hydroxide/magnesium hydroxide/simethicone Suspension 30 milliLiter(s) Oral every 4 hours PRN Dyspepsia  melatonin 3 milliGRAM(s) Oral at bedtime PRN Insomnia  ondansetron Injectable 4 milliGRAM(s) IV Push every 8 hours PRN Nausea and/or Vomiting      FAMILY HISTORY:      SOCIAL HISTORY:    [ ] Non-smoker  [ ] Smoker  [ ] Alcohol    Allergies    No Known Allergies    Intolerances    	    REVIEW OF SYSTEMS:  CONSTITUTIONAL: No fever, weight loss, or fatigue  EYES: No eye pain, visual disturbances, or discharge  ENT:  No difficulty hearing, tinnitus, vertigo; No sinus or throat pain  NECK: No pain or stiffness  RESPIRATORY: No cough, wheezing, chills or hemoptysis; No Shortness of Breath  CARDIOVASCULAR: No chest pain, palpitations, passing out, dizziness, or leg swelling  GASTROINTESTINAL: No abdominal or epigastric pain. No nausea, vomiting, or hematemesis; No diarrhea or constipation. No melena or hematochezia.  GENITOURINARY: No dysuria, frequency, hematuria, or incontinence  NEUROLOGICAL: No headaches, memory loss, loss of strength, numbness, or tremors, syncope  SKIN: No itching, burning, rashes, or lesions   LYMPH Nodes: No enlarged glands  ENDOCRINE: No heat or cold intolerance; No hair loss  MUSCULOSKELETAL: No joint pain or swelling; No muscle, back, or extremity pain  PSYCHIATRIC: No depression, anxiety, mood swings, or difficulty sleeping  HEME/LYMPH: No easy bruising, or bleeding gums  ALLERGY AND IMMUNOLOGIC: No hives or eczema	    [ ] All others negative	  [ ] Unable to obtain    PHYSICAL EXAM:  T(C): 36.8 (07-15-22 @ 06:15), Max: 37 (07-15-22 @ 00:31)  HR: 85 (07-15-22 @ 06:15) (52 - 85)  BP: 156/56 (07-15-22 @ 06:15) (131/72 - 159/81)  RR: 18 (07-15-22 @ 06:15) (16 - 18)  SpO2: 98% (07-15-22 @ 06:15) (93% - 98%)  Wt(kg): --  I&O's Summary    14 Jul 2022 07:01  -  15 Jul 2022 07:00  --------------------------------------------------------  IN: 0 mL / OUT: 250 mL / NET: -250 mL        Appearance: Normal	  HEENT:   Normal oral mucosa, PERRL, EOMI, r scalp hematoma	  Lymphatic: No lymphadenopathy  Cardiovascular: Normal S1 S2, No JVD, + murmurs, No edema  Respiratory: rhonchi  Psychiatry: A & O x 3, Mood & affect appropriate  Gastrointestinal:  Soft, Non-tender, + BS	  Skin: No rashes, No ecchymoses, No cyanosis	  Neurologic: Non-focal  Extremities: Normal range of motion, No clubbing, cyanosis or edema  Vascular: Peripheral pulses palpable 2+ bilaterally    TELEMETRY: 	    ECG:  	  RADIOLOGY:  OTHER: 	  	  LABS:	 	    CARDIAC MARKERS:                              14.2   6.44  )-----------( 248      ( 14 Jul 2022 15:19 )             45.2     07-14    141  |  98  |  11  ----------------------------<  104<H>  3.9   |  31  |  0.87    Ca    10.4      14 Jul 2022 15:19    TPro  8.0  /  Alb  4.0  /  TBili  0.4  /  DBili  x   /  AST  14  /  ALT  7<L>  /  AlkPhos  58  07-14    proBNP:   Lipid Profile:   HgA1c:   TSH:   PT/INR - ( 14 Jul 2022 15:19 )   PT: 12.0 sec;   INR: 1.04 ratio         PTT - ( 14 Jul 2022 15:19 )  PTT:30.1 sec    PREVIOUS DIAGNOSTIC TESTING:    < from: CT Abdomen and Pelvis No Cont (07.14.22 @ 15:58) >  Limited noncontrast exam.    No evidence of acute traumatic injury within the chest, abdomen, or   pelvis.    Indeterminate right renal lesion measuring 1.6 cm. Recommend further   evaluation with renal ultrasound.    Atherosclerotic changes with an infrarenal abdominal aortic aneurysm   measuring 3.1 cm.

## 2022-07-15 NOTE — PHYSICAL THERAPY INITIAL EVALUATION ADULT - DISCHARGE DISPOSITION, PT EVAL
[facility]; if home, pt would require assist with ALL ADL's and functional mobility and home PT./rehabilitation facility

## 2022-07-16 LAB
ANION GAP SERPL CALC-SCNC: 16 MMOL/L — SIGNIFICANT CHANGE UP (ref 5–17)
BUN SERPL-MCNC: 6 MG/DL — LOW (ref 7–23)
CALCIUM SERPL-MCNC: 8.8 MG/DL — SIGNIFICANT CHANGE UP (ref 8.4–10.5)
CHLORIDE SERPL-SCNC: 103 MMOL/L — SIGNIFICANT CHANGE UP (ref 96–108)
CO2 SERPL-SCNC: 20 MMOL/L — LOW (ref 22–31)
CREAT SERPL-MCNC: 0.74 MG/DL — SIGNIFICANT CHANGE UP (ref 0.5–1.3)
EGFR: 78 ML/MIN/1.73M2 — SIGNIFICANT CHANGE UP
GLUCOSE SERPL-MCNC: 79 MG/DL — SIGNIFICANT CHANGE UP (ref 70–99)
POTASSIUM SERPL-MCNC: 3.7 MMOL/L — SIGNIFICANT CHANGE UP (ref 3.5–5.3)
POTASSIUM SERPL-SCNC: 3.7 MMOL/L — SIGNIFICANT CHANGE UP (ref 3.5–5.3)
PREALB SERPL-MCNC: 10 MG/DL — LOW (ref 20–40)
SODIUM SERPL-SCNC: 139 MMOL/L — SIGNIFICANT CHANGE UP (ref 135–145)

## 2022-07-16 RX ADMIN — BRIMONIDINE TARTRATE 1 DROP(S): 2 SOLUTION/ DROPS OPHTHALMIC at 22:08

## 2022-07-16 RX ADMIN — AMLODIPINE BESYLATE 10 MILLIGRAM(S): 2.5 TABLET ORAL at 06:14

## 2022-07-16 RX ADMIN — DORZOLAMIDE HYDROCHLORIDE TIMOLOL MALEATE 1 DROP(S): 20; 5 SOLUTION/ DROPS OPHTHALMIC at 16:59

## 2022-07-16 RX ADMIN — FAMOTIDINE 20 MILLIGRAM(S): 10 INJECTION INTRAVENOUS at 12:48

## 2022-07-16 RX ADMIN — Medication 112 MICROGRAM(S): at 06:14

## 2022-07-16 RX ADMIN — ENOXAPARIN SODIUM 40 MILLIGRAM(S): 100 INJECTION SUBCUTANEOUS at 17:00

## 2022-07-16 RX ADMIN — Medication 1 TABLET(S): at 12:48

## 2022-07-16 RX ADMIN — LISINOPRIL 40 MILLIGRAM(S): 2.5 TABLET ORAL at 06:14

## 2022-07-16 RX ADMIN — SIMVASTATIN 10 MILLIGRAM(S): 20 TABLET, FILM COATED ORAL at 22:03

## 2022-07-16 RX ADMIN — BRIMONIDINE TARTRATE 1 DROP(S): 2 SOLUTION/ DROPS OPHTHALMIC at 06:13

## 2022-07-16 RX ADMIN — BRIMONIDINE TARTRATE 1 DROP(S): 2 SOLUTION/ DROPS OPHTHALMIC at 12:48

## 2022-07-16 RX ADMIN — LATANOPROST 1 DROP(S): 0.05 SOLUTION/ DROPS OPHTHALMIC; TOPICAL at 22:01

## 2022-07-16 RX ADMIN — DORZOLAMIDE HYDROCHLORIDE TIMOLOL MALEATE 1 DROP(S): 20; 5 SOLUTION/ DROPS OPHTHALMIC at 06:12

## 2022-07-16 NOTE — PROGRESS NOTE ADULT - ASSESSMENT
Patient is an 87 yo woman with past medical history of HTN, HLD, Hypothyroidism. Glaucoma, osteoarthritis presents to ed after mechanical trip and fall getting out of bathroom admitted for work up and evaluation   per report pt tested positive for covid on an in-home test recently   COVID-19 vaccine series completed w booster,  covid test positive here.. pt asymptomatic (except for chronic  ?smokers cough)        Problem/Plan - 1:  ·  Problem: Fall.   ·  Plan: appears to be mechanical fall   pt reportedly had some dizziness on and off with movement ( educated pt to ask for meclizine as needed. and keep well hydrated..   no signs of fracture on imaging  PT eval noted  final recommendations pending   fall precautions  orthostatics positive  >> KARTIK stockings   encourage po intake and hydration     Problem/Plan - 2:  ·  Problem: Pain of left knee and lower leg.   ·  Plan: possible contusion post fall  if not better / unable to walk, unlikely needs additional imaging to rule out fracture as her ROM at knees and hips are totally maintained  pain management as needed.     Problem/Plan - 3:  ·  Problem: 2019 novel coronavirus disease (COVID-19).   ·  Plan: pt with recent positive in home test   this does not reflect actual COVID 19 disease state  pt not symptomatic ( has chronic on and off cough)  no additional testing / management needed  monitor  supportive care  nutrition, hydration  vitamins   infection control eval for possible DC of isolation ?     Problem/Plan - 4:  ·  Problem: Ambulatory dysfunction.   ·  Plan: as above  PT.     Problem/Plan - 5:  ·  Problem: HTN (hypertension).   ·  Plan: hold HCTZ  Continue Current medications otherwise and monitor.  cardio to eval.     Problem/Plan - 6:  ·  Problem: Glaucoma.   ·  Plan: on multiple eye drops  Continue home medications.    -GI/DVT Prophylaxis per protocol.   lovenox   pepcid    discussed with patient in detail, expresses understanding of treatment plans.  Patient is an 87 yo woman with past medical history of HTN, HLD, Hypothyroidism. Glaucoma, osteoarthritis presents to ed after mechanical trip and fall getting out of bathroom admitted for work up and evaluation   per report pt tested positive for covid on an in-home test recently   COVID-19 vaccine series completed w booster,  covid test positive here.. pt asymptomatic (except for chronic  ?smokers cough)        Problem/Plan - 1:  ·  Problem: Fall.   ·  Plan: appears to be mechanical fall   pt reportedly had some dizziness on and off with movement ( educated pt to ask for meclizine as needed. and keep well hydrated..   no signs of fracture on imaging  PT eval noted  final recommendations pending   fall precautions  orthostatics positive  >> KARTIK stockings   encourage po intake and hydration     Problem/Plan - 2:  ·  Problem: Pain of left knee and lower leg.   ·  Plan: possible contusion post fall  if not better / unable to walk, unlikely needs additional imaging to rule out fracture as her ROM at knees and hips are totally maintained  pain management as needed.     Problem/Plan - 3:  ·  Problem: 2019 novel coronavirus disease (COVID-19).   ·  Plan: pt with recent positive in home test   this does not reflect actual COVID 19 disease state  pt not symptomatic ( has chronic on and off cough)  no additional testing / management needed  monitor  supportive care  nutrition, hydration  vitamins   infection control eval for possible DC of isolation ?     Problem/Plan - 4:  ·  Problem: Ambulatory dysfunction.   ·  Plan: as above  PT.     Problem/Plan - 5:  ·  Problem: HTN (hypertension).   ·  Plan: hold HCTZ  Continue Current medications otherwise and monitor.  cardio to eval.     Problem/Plan - 6:  ·  Problem: Glaucoma.   ·  Plan: on multiple eye drops  Continue home medications.    Problem/plan 7:  Hypothyroidism  check TSH   continue synthroid 112 mcg qd    -GI/DVT Prophylaxis per protocol.   lovenox   pepcid    discussed with patient in detail, expresses understanding of treatment plans.

## 2022-07-17 LAB — TSH SERPL-MCNC: 3.28 UIU/ML — SIGNIFICANT CHANGE UP (ref 0.27–4.2)

## 2022-07-17 RX ORDER — MECLIZINE HCL 12.5 MG
12.5 TABLET ORAL EVERY 8 HOURS
Refills: 0 | Status: DISCONTINUED | OUTPATIENT
Start: 2022-07-17 | End: 2022-07-27

## 2022-07-17 RX ADMIN — BRIMONIDINE TARTRATE 1 DROP(S): 2 SOLUTION/ DROPS OPHTHALMIC at 21:37

## 2022-07-17 RX ADMIN — DORZOLAMIDE HYDROCHLORIDE TIMOLOL MALEATE 1 DROP(S): 20; 5 SOLUTION/ DROPS OPHTHALMIC at 05:36

## 2022-07-17 RX ADMIN — Medication 112 MICROGRAM(S): at 05:35

## 2022-07-17 RX ADMIN — SIMVASTATIN 10 MILLIGRAM(S): 20 TABLET, FILM COATED ORAL at 21:41

## 2022-07-17 RX ADMIN — LISINOPRIL 40 MILLIGRAM(S): 2.5 TABLET ORAL at 05:36

## 2022-07-17 RX ADMIN — Medication 1 TABLET(S): at 12:12

## 2022-07-17 RX ADMIN — BRIMONIDINE TARTRATE 1 DROP(S): 2 SOLUTION/ DROPS OPHTHALMIC at 12:19

## 2022-07-17 RX ADMIN — LATANOPROST 1 DROP(S): 0.05 SOLUTION/ DROPS OPHTHALMIC; TOPICAL at 21:38

## 2022-07-17 RX ADMIN — DORZOLAMIDE HYDROCHLORIDE TIMOLOL MALEATE 1 DROP(S): 20; 5 SOLUTION/ DROPS OPHTHALMIC at 21:41

## 2022-07-17 RX ADMIN — FAMOTIDINE 20 MILLIGRAM(S): 10 INJECTION INTRAVENOUS at 12:12

## 2022-07-17 RX ADMIN — Medication 12.5 MILLIGRAM(S): at 21:40

## 2022-07-17 RX ADMIN — Medication 12.5 MILLIGRAM(S): at 15:19

## 2022-07-17 RX ADMIN — Medication 3 MILLIGRAM(S): at 21:40

## 2022-07-17 RX ADMIN — ENOXAPARIN SODIUM 40 MILLIGRAM(S): 100 INJECTION SUBCUTANEOUS at 18:20

## 2022-07-17 RX ADMIN — BRIMONIDINE TARTRATE 1 DROP(S): 2 SOLUTION/ DROPS OPHTHALMIC at 06:18

## 2022-07-17 RX ADMIN — AMLODIPINE BESYLATE 10 MILLIGRAM(S): 2.5 TABLET ORAL at 05:36

## 2022-07-17 NOTE — PROGRESS NOTE ADULT - ASSESSMENT
_________________________________________________________________________________________  ========>>  M E D I C A L   A T T E N D I N G    F O L L O W  U P  N O T E  <<=========  -----------------------------------------------------------------------------------------------------    - Patient seen and examined by me earlier today.   - In summary,  ROMULO KOHLI is a 88y year old woman admitted post fall, pain, COVID   - Patient today overall doing ok, comfortable, eating OK. overall otherwise doing better      pt still with some c/o dizziness with movement . not getting meclizine .. d/w RN and ordered as ATC         encouraged pt to increase PO intake and hydration ( has 4 bottles of ensure at bedside, untouched)     ==================>> REVIEW OF SYSTEM <<=================    GEN: no fever, no chills, pain in left arm and leg improved   RESP: no SOB, no cough, no sputum  CVS: no chest pain, no palpitations, no edema  GI: no abdominal pain, no nausea, no constipation, no diarrhea  : no dysuria, no frequency  Neuro: no headache, ++ dizziness with movement   Derm : no itching, no rash    ==================>> PHYSICAL EXAM <<=================    GEN: A&O X 3 , NAD , comfortable, pleasant, calm in bed, cachectic    HEENT: NCAT, PERRL, MMM, hearing intact  Neck: supple , no JVD appreciated  CVS: S1S2 , regular , No M/R/G appreciated  PULM: CTA B/L,  no W/R/R appreciated  ABD.: soft. non tender, non distended,  bowel sounds present  Extrem: intact pulses , no edema   PSYCH : normal mood,  not anxious                             ( Note written / Date of service 07-17-22 )    ==================>> MEDICATIONS <<====================    amLODIPine   Tablet 10 milliGRAM(s) Oral daily  brimonidine 0.2% Ophthalmic Solution 1 Drop(s) Both EYES three times a day  dorzolamide 2%/timolol 0.5% Ophthalmic Solution 1 Drop(s) Both EYES two times a day  enoxaparin Injectable 40 milliGRAM(s) SubCutaneous every 24 hours  famotidine    Tablet 20 milliGRAM(s) Oral daily  latanoprost 0.005% Ophthalmic Solution 1 Drop(s) Both EYES at bedtime  levothyroxine 112 MICROGram(s) Oral daily  lisinopril 40 milliGRAM(s) Oral daily  meclizine 12.5 milliGRAM(s) Oral every 8 hours  multivitamin/minerals 1 Tablet(s) Oral daily  simvastatin 10 milliGRAM(s) Oral at bedtime  sodium chloride 0.9%. 1000 milliLiter(s) IV Continuous <Continuous>    MEDICATIONS  (PRN):  acetaminophen     Tablet .. 650 milliGRAM(s) Oral every 6 hours PRN Temp greater or equal to 38C (100.4F), Mild Pain (1 - 3)  aluminum hydroxide/magnesium hydroxide/simethicone Suspension 30 milliLiter(s) Oral every 4 hours PRN Dyspepsia  melatonin 3 milliGRAM(s) Oral at bedtime PRN Insomnia  ondansetron Injectable 4 milliGRAM(s) IV Push every 8 hours PRN Nausea and/or Vomiting    ___________  Active diet:  Diet, Regular:   Supplement Feeding Modality:  Oral  Ensure Max Cans or Servings Per Day:  2       Frequency:  Two Times a day  ___________________    ==================>> VITAL SIGNS <<==================    Height (cm): 165.1  Weight (kg): 49.9  BMI (kg/m2): 18.3  Vital Signs Last 24 HrsT(C): 36.7 (07-17-22 @ 04:26)  T(F): 98 (07-17-22 @ 04:26), Max: 98.1 (07-16-22 @ 14:30)  HR: 61 (07-17-22 @ 04:26) (61 - 61)  BP: 148/79 (07-17-22 @ 04:26)  RR: 18 (07-17-22 @ 04:26) (18 - 18)  SpO2: 96% (07-17-22 @ 04:26) (96% - 97%)       ==================>> LAB AND IMAGING <<==================       07-16    139  |  103  |  6<L>  ----------------------------<  79  3.7   |  20<L>  |  0.74    Ca    8.8      16 Jul 2022 07:21      WBC count:   6.23 <<== ,  6.44 <<==   Hemoglobin:   14.8 <<==,  14.2 <<==  platelets:  284 <==, 248 <==    Creatinine:  0.74  <<==, 0.76  <<==, 0.87  <<==  Sodium:   139  <==, 140  <==, 141  <==       AST:          11 <== , 14 <==      ALT:        7  <== , 7  <==      AP:        61  <=, 58  <=     Bili:        0.4  <=, 0.4  <=    ____________________________    M I C R O B I O L O G Y :    Culture - Urine (collected 14 Jul 2022 15:21)  Source: Catheterized Catheterized  Preliminary Report (16 Jul 2022 22:10):    >100,000 CFU/ml Gram Negative Rods    COVID-19 PCR: Detected (07-14-22 @ 15:19)    ___________________________________________________________________________________  ===============>>  A S S E S S M E N T   A N D   P L A N <<===============  ------------------------------------------------------------------------------------------    · Assessment	  Patient is an 87 yo woman with past medical history of HTN, HLD, Hypothyroidism. Glaucoma, osteoarthritis presents to ed after mechanical trip and fall getting out of bathroom.  pt lives by herself but her grand children live with her.. she woke u p this morning, went to bathroom , voided and was going back to bedroom when she may have tripped, twisted and fell on the floor and could not get up..  pt was unable to ambulates after falling due to pain in left leg  no fractures found  + small hematoma on Rt post scalp..  pt normally walks with bennett at home and walker outside the home     per report pt tested positive for covid on an in-home test recently   COVID-19 vaccine series completed w booster,  covid test positive here.. pt asymptomatic (except for chronic  ?smokers cough)       Problem/Plan - 1:  ·  Problem: Fall.   ·  Plan: appears to be mechanical fall . imbalance  pt with  some dizziness with movement >> meclizine ATC ordered   no signs of fracture on imaging  PT eval / OOB to chair daily as able   fall precautions  orthostatics positive  >> KARTIK stockings on   encourage po intake and hydration  will need rehab    Problem/Plan - 2:  ·  Problem: Pain of left knee and lower leg.   ·  Plan: possible contusion post fall  if not better / unable to walk, may need additional imaging to rule out fracture  PT / OOB   pain mgmt as needed.    Problem/Plan - 3:  ·  Problem: 2019 novel coronavirus disease (COVID-19).   ·  Plan: pt with recent positive in home test   pt not symptomatic ( has chronic on and off cough)  no additional testing / management needed  monitor  supportive care  nutrition, hydration  vitamins   infection control eval for possible DC of isolation ?    a Urin culture was sent yesterday : is positive .. will follow culture / sensitivities..     Problem/Plan - 4:  ·  Problem: Ambulatory dysfunction.   ·  Plan: as above  PT.    Problem/Plan - 5:  ·  Problem: HTN (hypertension).   ·  Plan: hold HCTZ  Continue Current medications otherwise and monitor.  cardio to eval.    Problem/Plan - 6:  ·  Problem: Glaucoma.   ·  Plan: on multiple eye drops  Continue home medications.    -GI/DVT Prophylaxis per protocol.   lovenox   pepcid     --------------------------------------------  Case discussed with pt.. RN, NP  Education given on findings and plan of care  ___________________________  H. GATITO Thompson.  Pager: [phone]

## 2022-07-18 LAB
-  AMIKACIN: SIGNIFICANT CHANGE UP
-  AMOXICILLIN/CLAVULANIC ACID: SIGNIFICANT CHANGE UP
-  AMPICILLIN/SULBACTAM: SIGNIFICANT CHANGE UP
-  AMPICILLIN: SIGNIFICANT CHANGE UP
-  AZTREONAM: SIGNIFICANT CHANGE UP
-  CEFAZOLIN: SIGNIFICANT CHANGE UP
-  CEFEPIME: SIGNIFICANT CHANGE UP
-  CEFOXITIN: SIGNIFICANT CHANGE UP
-  CEFTRIAXONE: SIGNIFICANT CHANGE UP
-  CIPROFLOXACIN: SIGNIFICANT CHANGE UP
-  ERTAPENEM: SIGNIFICANT CHANGE UP
-  GENTAMICIN: SIGNIFICANT CHANGE UP
-  IMIPENEM: SIGNIFICANT CHANGE UP
-  LEVOFLOXACIN: SIGNIFICANT CHANGE UP
-  MEROPENEM: SIGNIFICANT CHANGE UP
-  NITROFURANTOIN: SIGNIFICANT CHANGE UP
-  PIPERACILLIN/TAZOBACTAM: SIGNIFICANT CHANGE UP
-  TIGECYCLINE: SIGNIFICANT CHANGE UP
-  TOBRAMYCIN: SIGNIFICANT CHANGE UP
-  TRIMETHOPRIM/SULFAMETHOXAZOLE: SIGNIFICANT CHANGE UP
ANION GAP SERPL CALC-SCNC: 10 MMOL/L — SIGNIFICANT CHANGE UP (ref 5–17)
BUN SERPL-MCNC: 14 MG/DL — SIGNIFICANT CHANGE UP (ref 7–23)
CALCIUM SERPL-MCNC: 9.6 MG/DL — SIGNIFICANT CHANGE UP (ref 8.4–10.5)
CHLORIDE SERPL-SCNC: 104 MMOL/L — SIGNIFICANT CHANGE UP (ref 96–108)
CO2 SERPL-SCNC: 27 MMOL/L — SIGNIFICANT CHANGE UP (ref 22–31)
CREAT SERPL-MCNC: 0.94 MG/DL — SIGNIFICANT CHANGE UP (ref 0.5–1.3)
CULTURE RESULTS: SIGNIFICANT CHANGE UP
EGFR: 58 ML/MIN/1.73M2 — LOW
GLUCOSE SERPL-MCNC: 88 MG/DL — SIGNIFICANT CHANGE UP (ref 70–99)
HCT VFR BLD CALC: 43.6 % — SIGNIFICANT CHANGE UP (ref 34.5–45)
HGB BLD-MCNC: 14.2 G/DL — SIGNIFICANT CHANGE UP (ref 11.5–15.5)
MAGNESIUM SERPL-MCNC: 1.7 MG/DL — SIGNIFICANT CHANGE UP (ref 1.6–2.6)
MCHC RBC-ENTMCNC: 27.4 PG — SIGNIFICANT CHANGE UP (ref 27–34)
MCHC RBC-ENTMCNC: 32.6 GM/DL — SIGNIFICANT CHANGE UP (ref 32–36)
MCV RBC AUTO: 84 FL — SIGNIFICANT CHANGE UP (ref 80–100)
METHOD TYPE: SIGNIFICANT CHANGE UP
NRBC # BLD: 0 /100 WBCS — SIGNIFICANT CHANGE UP (ref 0–0)
ORGANISM # SPEC MICROSCOPIC CNT: SIGNIFICANT CHANGE UP
ORGANISM # SPEC MICROSCOPIC CNT: SIGNIFICANT CHANGE UP
PHOSPHATE SERPL-MCNC: 2.5 MG/DL — SIGNIFICANT CHANGE UP (ref 2.5–4.5)
PLATELET # BLD AUTO: 300 K/UL — SIGNIFICANT CHANGE UP (ref 150–400)
POTASSIUM SERPL-MCNC: 3.8 MMOL/L — SIGNIFICANT CHANGE UP (ref 3.5–5.3)
POTASSIUM SERPL-SCNC: 3.8 MMOL/L — SIGNIFICANT CHANGE UP (ref 3.5–5.3)
RBC # BLD: 5.19 M/UL — SIGNIFICANT CHANGE UP (ref 3.8–5.2)
RBC # FLD: 13.6 % — SIGNIFICANT CHANGE UP (ref 10.3–14.5)
SODIUM SERPL-SCNC: 141 MMOL/L — SIGNIFICANT CHANGE UP (ref 135–145)
SPECIMEN SOURCE: SIGNIFICANT CHANGE UP
WBC # BLD: 6.93 K/UL — SIGNIFICANT CHANGE UP (ref 3.8–10.5)
WBC # FLD AUTO: 6.93 K/UL — SIGNIFICANT CHANGE UP (ref 3.8–10.5)

## 2022-07-18 RX ADMIN — BRIMONIDINE TARTRATE 1 DROP(S): 2 SOLUTION/ DROPS OPHTHALMIC at 22:16

## 2022-07-18 RX ADMIN — ENOXAPARIN SODIUM 40 MILLIGRAM(S): 100 INJECTION SUBCUTANEOUS at 17:48

## 2022-07-18 RX ADMIN — LATANOPROST 1 DROP(S): 0.05 SOLUTION/ DROPS OPHTHALMIC; TOPICAL at 22:16

## 2022-07-18 RX ADMIN — SIMVASTATIN 10 MILLIGRAM(S): 20 TABLET, FILM COATED ORAL at 22:16

## 2022-07-18 RX ADMIN — Medication 12.5 MILLIGRAM(S): at 22:16

## 2022-07-18 RX ADMIN — Medication 12.5 MILLIGRAM(S): at 16:18

## 2022-07-18 RX ADMIN — LISINOPRIL 40 MILLIGRAM(S): 2.5 TABLET ORAL at 06:24

## 2022-07-18 RX ADMIN — DORZOLAMIDE HYDROCHLORIDE TIMOLOL MALEATE 1 DROP(S): 20; 5 SOLUTION/ DROPS OPHTHALMIC at 06:57

## 2022-07-18 RX ADMIN — FAMOTIDINE 20 MILLIGRAM(S): 10 INJECTION INTRAVENOUS at 16:17

## 2022-07-18 RX ADMIN — AMLODIPINE BESYLATE 10 MILLIGRAM(S): 2.5 TABLET ORAL at 06:23

## 2022-07-18 RX ADMIN — Medication 112 MICROGRAM(S): at 06:23

## 2022-07-18 RX ADMIN — DORZOLAMIDE HYDROCHLORIDE TIMOLOL MALEATE 1 DROP(S): 20; 5 SOLUTION/ DROPS OPHTHALMIC at 17:48

## 2022-07-18 RX ADMIN — BRIMONIDINE TARTRATE 1 DROP(S): 2 SOLUTION/ DROPS OPHTHALMIC at 06:24

## 2022-07-18 RX ADMIN — Medication 12.5 MILLIGRAM(S): at 06:24

## 2022-07-18 RX ADMIN — Medication 1 TABLET(S): at 16:18

## 2022-07-18 RX ADMIN — BRIMONIDINE TARTRATE 1 DROP(S): 2 SOLUTION/ DROPS OPHTHALMIC at 14:17

## 2022-07-18 NOTE — DIETITIAN INITIAL EVALUATION ADULT - ORAL INTAKE PTA/DIET HISTORY
patient c/o loosing her taste over past year, not feeling like eating. Her usual weight was 140 lbs on 12/20/2019 as she shared a n MD visit record with the Dietitian  Her current weight noted is 110 bs, a stated weight. Bedscale used for current/20.4 actual weight 123 lbs notes. Also, patient  height is  5'5" making her BMI 20.4.   Patient said its her loss of taste that is effecting her decreased appetite. Preferences noted, pt lie sandwiches

## 2022-07-18 NOTE — DIETITIAN INITIAL EVALUATION ADULT - REASON INDICATOR FOR ASSESSMENT
Med: Oxycodone  Dosage: 15mg  Si tablet 2 times daily  Quantity requested:        Mail Order: no    Preferred pharmacy has been set up and verified.  [facility] [phone]  
oxyCODONE, IMM REL, (ROXICODONE) 15 MG immediate release tablet 60 tablet 0 1/17/2022     Sig - Route: Take 1 tablet by mouth 2 times daily.         Last seen- 12/17/21  Upcoming appt- none  Maintenance med - yes  Please advise.      
BMI<19 Patient is an 89 yo woman with past medical history of HTN, HLD, Hypothyroidism. Glaucoma, osteoarthritis presents to ed after mechanical trip and fall getting out of bathroom admitted for work up and evaluation

## 2022-07-18 NOTE — DIETITIAN INITIAL EVALUATION ADULT - PERTINENT MEDS FT
MEDICATIONS  (STANDING):  amLODIPine   Tablet 10 milliGRAM(s) Oral daily  brimonidine 0.2% Ophthalmic Solution 1 Drop(s) Both EYES three times a day  dorzolamide 2%/timolol 0.5% Ophthalmic Solution 1 Drop(s) Both EYES two times a day  enoxaparin Injectable 40 milliGRAM(s) SubCutaneous every 24 hours  famotidine    Tablet 20 milliGRAM(s) Oral daily  latanoprost 0.005% Ophthalmic Solution 1 Drop(s) Both EYES at bedtime  levothyroxine 112 MICROGram(s) Oral daily  lisinopril 40 milliGRAM(s) Oral daily  meclizine 12.5 milliGRAM(s) Oral every 8 hours  multivitamin/minerals 1 Tablet(s) Oral daily  simvastatin 10 milliGRAM(s) Oral at bedtime  sodium chloride 0.9%. 1000 milliLiter(s) (60 mL/Hr) IV Continuous <Continuous>    MEDICATIONS  (PRN):  acetaminophen     Tablet .. 650 milliGRAM(s) Oral every 6 hours PRN Temp greater or equal to 38C (100.4F), Mild Pain (1 - 3)  aluminum hydroxide/magnesium hydroxide/simethicone Suspension 30 milliLiter(s) Oral every 4 hours PRN Dyspepsia  melatonin 3 milliGRAM(s) Oral at bedtime PRN Insomnia  ondansetron Injectable 4 milliGRAM(s) IV Push every 8 hours PRN Nausea and/or Vomiting

## 2022-07-18 NOTE — DIETITIAN INITIAL EVALUATION ADULT - ADD RECOMMEND
Add ensure enlive x2 daily, patient does not accept Ensure max well; monitor/encourage PO intake. monitor weight, skin, labs.

## 2022-07-18 NOTE — DIETITIAN INITIAL EVALUATION ADULT - PATIENT MEETS CRITERIA FOR MALNUTRITION
Patient came in for nurse blood pressure check. Automatic reading was 159/66 P- 62. Right arm resting on desk, feet flat on floor, back straight. Patient had no c/o pain. Patient stated that they took their prescribed blood pressure medication last pm. Allergies and medications reviewed with the patient. Blood pressure re-checked after 5 minutes with manual cuff, reading was 148/74. Patient advised to continue taking medications as prescribed and follow up as scheduled. Patient advised that message will be sent to the provider for recommendations if needed and we will call with the reply.     
yes

## 2022-07-18 NOTE — DIETITIAN INITIAL EVALUATION ADULT - PERTINENT LABORATORY DATA
07-18    141  |  104  |  14  ----------------------------<  88  3.8   |  27  |  0.94    Ca    9.6      18 Jul 2022 07:25  Phos  2.5     07-18  Mg     1.7     07-18

## 2022-07-18 NOTE — PROGRESS NOTE ADULT - ASSESSMENT
( Note written / Date of service 07-18-22 )    ==================>> MEDICATIONS <<====================    amLODIPine   Tablet 10 milliGRAM(s) Oral daily  brimonidine 0.2% Ophthalmic Solution 1 Drop(s) Both EYES three times a day  dorzolamide 2%/timolol 0.5% Ophthalmic Solution 1 Drop(s) Both EYES two times a day  enoxaparin Injectable 40 milliGRAM(s) SubCutaneous every 24 hours  famotidine    Tablet 20 milliGRAM(s) Oral daily  latanoprost 0.005% Ophthalmic Solution 1 Drop(s) Both EYES at bedtime  levothyroxine 112 MICROGram(s) Oral daily  lisinopril 40 milliGRAM(s) Oral daily  meclizine 12.5 milliGRAM(s) Oral every 8 hours  multivitamin/minerals 1 Tablet(s) Oral daily  simvastatin 10 milliGRAM(s) Oral at bedtime  sodium chloride 0.9%. 1000 milliLiter(s) IV Continuous <Continuous>    MEDICATIONS  (PRN):  acetaminophen     Tablet .. 650 milliGRAM(s) Oral every 6 hours PRN Temp greater or equal to 38C (100.4F), Mild Pain (1 - 3)  aluminum hydroxide/magnesium hydroxide/simethicone Suspension 30 milliLiter(s) Oral every 4 hours PRN Dyspepsia  melatonin 3 milliGRAM(s) Oral at bedtime PRN Insomnia  ondansetron Injectable 4 milliGRAM(s) IV Push every 8 hours PRN Nausea and/or Vomiting    ___________  Active diet:  Diet, Regular:   Supplement Feeding Modality:  Oral  Ensure Max Cans or Servings Per Day:  2       Frequency:  Two Times a day  ___________________    ==================>> VITAL SIGNS <<==================    Vital Signs Last 24 HrsT(C): 36.7 (07-18-22 @ 13:19)  T(F): 98 (07-18-22 @ 13:19), Max: 98.4 (07-17-22 @ 21:01)  HR: 65 (07-18-22 @ 03:56) (65 - 68)  BP: 152/86 (07-18-22 @ 03:56)  RR: 18 (07-18-22 @ 13:19) (18 - 20)  SpO2: 96% (07-18-22 @ 13:19) (95% - 98%)      CAPILLARY BLOOD GLUCOSE         ==================>> LAB AND IMAGING <<==================                        14.2   6.93  )-----------( 300      ( 18 Jul 2022 07:25 )             43.6        07-18    141  |  104  |  14  ----------------------------<  88  3.8   |  27  |  0.94    Ca    9.6      18 Jul 2022 07:25  Phos  2.5     07-18  Mg     1.7     07-18      WBC count:   6.93 <<== ,  6.23 <<== ,  6.44 <<==   Hemoglobin:   14.2 <<==,  14.8 <<==,  14.2 <<==  platelets:  300 <==, 284 <==, 248 <==    Creatinine:  0.94  <<==, 0.74  <<==, 0.76  <<==, 0.87  <<==  Sodium:   141  <==, 139  <==, 140  <==, 141  <==       AST:          11 <== , 14 <==      ALT:        7  <== , 7  <==      AP:        61  <=, 58  <=     Bili:        0.4  <=, 0.4  <=    ____________________________    M I C R O B I O L O G Y :    Culture - Urine (collected 14 Jul 2022 15:21)  Source: Catheterized Catheterized  Final Report (18 Jul 2022 10:12):    >100,000 CFU/ml Klebsiella pneumoniae  Organism: Klebsiella pneumoniae (18 Jul 2022 10:12)  Organism: Klebsiella pneumoniae (18 Jul 2022 10:12)    Sensitivities:      -  Amikacin: S <=16      -  Amoxicillin/Clavulanic Acid: S <=8/4      -  Ampicillin: R >16 These ampicillin results predict results for amoxicillin      -  Ampicillin/Sulbactam: S <=4/2 Enterobacter, Klebsiella aerogenes, Citrobacter, and Serratia may develop resistance during prolonged therapy (3-4 days)      -  Aztreonam: S <=4      -  Cefazolin: S <=2 ([email or web address]) For uncomplicated UTI with K. pneumoniae, E. coli, or P. mirablis: LOLIS <=16 is sensitive and LOLIS >=32 is resistant. This also predicts results for oral agents cefaclor, cefdinir, cefpodoxime, cefprozil, cefuroxime axetil, cephalexin and locarbef for uncomplicated UTI. Note that some isolates may be susceptible to these agents while testing resistant to cefazolin.      -  Cefepime: S <=2      -  Cefoxitin: S <=8      -  Ceftriaxone: S <=1 Enterobacter, Klebsiella aerogenes, Citrobacter, and Serratia may develop resistance during prolonged therapy      -  Ciprofloxacin: S <=0.25      -  Ertapenem: S <=0.5      -  Gentamicin: S <=2      -  Imipenem: S <=1      -  Levofloxacin: S <=0.5      -  Meropenem: S <=1      -  Nitrofurantoin: S <=32 Should not be used to treat pyelonephritis      -  Piperacillin/Tazobactam: S <=8      -  Tigecycline: S <=2      -  Tobramycin: S <=2      -  Trimethoprim/Sulfamethoxazole: S <=0.5/9.5      Method Type: LOLIS        COVID-19 PCR: Detected (07-14-22 @ 15:19)     _________________________________________________________________________________________  ========>>  M E D I C A L   A T T E N D I N G    F O L L O W  U P  N O T E  <<=========  -----------------------------------------------------------------------------------------------------    - Patient seen and examined by me earlier today.   - In summary,  ROMULO KOHLI is a 88y year old woman admitted post fall, pain, COVID   - Patient today overall doing ok, comfortable, eating OK. overall otherwise doing better      pt still with some c/o dizziness with movement . meclizine > continue ATC         encouraged pt to increase PO intake and hydration , OOB to chair daily ; will need rehab     ==================>> REVIEW OF SYSTEM <<=================    GEN: no fever, no chills, pain in left arm and leg improved   RESP: no SOB, no cough, no sputum  CVS: no chest pain, no palpitations, no edema  GI: no abdominal pain, no nausea, no constipation, no diarrhea  : no dysuria, no frequency  Neuro: no headache, ++ dizziness with movement   Derm : no itching, no rash    ==================>> PHYSICAL EXAM <<=================    GEN: A&O X 3 , NAD , comfortable, pleasant, calm in bed, cachectic    HEENT: NCAT, PERRL, MMM, hearing intact  Neck: supple , no JVD appreciated  CVS: S1S2 , regular , No M/R/G appreciated  PULM: CTA B/L,  no W/R/R appreciated  ABD.: soft. non tender, non distended,  bowel sounds present  Extrem: intact pulses , no edema   PSYCH : normal mood,  not anxious                               ( Note written / Date of service 07-18-22 )    ==================>> MEDICATIONS <<====================    amLODIPine   Tablet 10 milliGRAM(s) Oral daily  brimonidine 0.2% Ophthalmic Solution 1 Drop(s) Both EYES three times a day  dorzolamide 2%/timolol 0.5% Ophthalmic Solution 1 Drop(s) Both EYES two times a day  enoxaparin Injectable 40 milliGRAM(s) SubCutaneous every 24 hours  famotidine    Tablet 20 milliGRAM(s) Oral daily  latanoprost 0.005% Ophthalmic Solution 1 Drop(s) Both EYES at bedtime  levothyroxine 112 MICROGram(s) Oral daily  lisinopril 40 milliGRAM(s) Oral daily  meclizine 12.5 milliGRAM(s) Oral every 8 hours  multivitamin/minerals 1 Tablet(s) Oral daily  simvastatin 10 milliGRAM(s) Oral at bedtime  sodium chloride 0.9%. 1000 milliLiter(s) IV Continuous <Continuous>    MEDICATIONS  (PRN):  acetaminophen     Tablet .. 650 milliGRAM(s) Oral every 6 hours PRN Temp greater or equal to 38C (100.4F), Mild Pain (1 - 3)  aluminum hydroxide/magnesium hydroxide/simethicone Suspension 30 milliLiter(s) Oral every 4 hours PRN Dyspepsia  melatonin 3 milliGRAM(s) Oral at bedtime PRN Insomnia  ondansetron Injectable 4 milliGRAM(s) IV Push every 8 hours PRN Nausea and/or Vomiting    ___________  Active diet:  Diet, Regular:   Supplement Feeding Modality:  Oral  Ensure Max Cans or Servings Per Day:  2       Frequency:  Two Times a day  ___________________    ==================>> VITAL SIGNS <<==================    Vital Signs Last 24 HrsT(C): 36.7 (07-18-22 @ 13:19)  T(F): 98 (07-18-22 @ 13:19), Max: 98.4 (07-17-22 @ 21:01)  HR: 65 (07-18-22 @ 03:56) (65 - 68)  BP: 152/86 (07-18-22 @ 03:56)  RR: 18 (07-18-22 @ 13:19) (18 - 20)  SpO2: 96% (07-18-22 @ 13:19) (95% - 98%)       ==================>> LAB AND IMAGING <<==================                        14.2   6.93  )-----------( 300      ( 18 Jul 2022 07:25 )             43.6        07-18    141  |  104  |  14  ----------------------------<  88  3.8   |  27  |  0.94    Ca    9.6      18 Jul 2022 07:25  Phos  2.5     07-18  Mg     1.7     07-18      WBC count:   6.93 <<== ,  6.23 <<== ,  6.44 <<==   Hemoglobin:   14.2 <<==,  14.8 <<==,  14.2 <<==  platelets:  300 <==, 284 <==, 248 <==    Creatinine:  0.94  <<==, 0.74  <<==, 0.76  <<==, 0.87  <<==  Sodium:   141  <==, 139  <==, 140  <==, 141  <==       AST:          11 <== , 14 <==      ALT:        7  <== , 7  <==      AP:        61  <=, 58  <=     Bili:        0.4  <=, 0.4  <=    ____________________________    M I C R O B I O L O G Y :    Culture - Urine (collected 14 Jul 2022 15:21)  Source: Catheterized Catheterized  Final Report (18 Jul 2022 10:12):    >100,000 CFU/ml Klebsiella pneumoniae  Organism: Klebsiella pneumoniae (18 Jul 2022 10:12)  Organism: Klebsiella pneumoniae (18 Jul 2022 10:12)    Sensitivities:      -  Amikacin: S <=16      -  Amoxicillin/Clavulanic Acid: S <=8/4      -  Ampicillin: R >16 These ampicillin results predict results for amoxicillin      -  Ampicillin/Sulbactam: S <=4/2 Enterobacter, Klebsiella aerogenes, Citrobacter, and Serratia may develop resistance during prolonged therapy (3-4 days)      -  Aztreonam: S <=4      -  Cefazolin: S <=2 ([email or web address]) For uncomplicated UTI with K. pneumoniae, E. coli, or P. mirablis: LOLIS <=16 is sensitive and LOLIS >=32 is resistant. This also predicts results for oral agents cefaclor, cefdinir, cefpodoxime, cefprozil, cefuroxime axetil, cephalexin and locarbef for uncomplicated UTI. Note that some isolates may be susceptible to these agents while testing resistant to cefazolin.      -  Cefepime: S <=2      -  Cefoxitin: S <=8      -  Ceftriaxone: S <=1 Enterobacter, Klebsiella aerogenes, Citrobacter, and Serratia may develop resistance during prolonged therapy      -  Ciprofloxacin: S <=0.25      -  Ertapenem: S <=0.5      -  Gentamicin: S <=2      -  Imipenem: S <=1      -  Levofloxacin: S <=0.5      -  Meropenem: S <=1      -  Nitrofurantoin: S <=32 Should not be used to treat pyelonephritis      -  Piperacillin/Tazobactam: S <=8      -  Tigecycline: S <=2      -  Tobramycin: S <=2      -  Trimethoprim/Sulfamethoxazole: S <=0.5/9.5      Method Type: LLOIS      COVID-19 PCR: Detected (07-14-22 @ 15:19)      ___________________________________________________________________________________  ===============>>  A S S E S S M E N T   A N D   P L A N <<===============  ------------------------------------------------------------------------------------------    · Assessment	  Patient is an 87 yo woman with past medical history of HTN, HLD, Hypothyroidism. Glaucoma, osteoarthritis presents to ed after mechanical trip and fall getting out of bathroom.  pt lives by herself but her grand children live with her.. she woke u p this morning, went to bathroom , voided and was going back to bedroom when she may have tripped, twisted and fell on the floor and could not get up..  pt was unable to ambulates after falling due to pain in left leg  no fractures found  + small hematoma on Rt post scalp..  pt normally walks with bennett at home and walker outside the home     per report pt tested positive for covid on an in-home test recently   COVID-19 vaccine series completed w booster,  covid test positive here.. pt asymptomatic (except for chronic  ?smokers cough)       Problem/Plan - 1:  ·  Problem: Fall.   ·  Plan: appears to be mechanical fall . imbalance  pt with  some dizziness with movement >> meclizine ATC ordered   no signs of fracture on imaging  PT eval / OOB to chair daily as able   fall precautions  orthostatics positive  >> KARTIK stockings on   echo  encourage po intake and hydration  OOB to chair daily as able   will need rehab    Problem/Plan - 2:  ·  Problem: Pain of left knee and lower leg.   ·  Plan: possible contusion post fall  if not better / unable to walk, may need additional imaging to rule out fracture  PT / OOB   pain mgmt as needed.    Problem/Plan - 3:  ·  Problem: 2019 novel coronavirus disease (COVID-19).   ·  Plan: pt with recent positive in home test   pt not symptomatic ( has chronic on and off cough)  no additional testing / management needed  monitor  supportive care  nutrition, hydration  vitamins   infection control eval for possible DC of isolation ?    a Urin culture was sent  is positive .. sensitivities..  noted     will give 3 days of abx     Problem/Plan - 4:  ·  Problem: Ambulatory dysfunction.   ·  Plan: as above  PT.    Problem/Plan - 5:  ·  Problem: HTN (hypertension).   ·  Plan: hold HCTZ  Continue Current medications otherwise and monitor.  cardio to eval.    Problem/Plan - 6:  ·  Problem: Glaucoma.   ·  Plan: on multiple eye drops  Continue home medications.    -GI/DVT Prophylaxis per protocol.   lovenox   pepcid     --------------------------------------------  Case discussed with pt.. RN, NP  Education given on findings and plan of care  ___________________________  H. GATITO Thompson.  Pager: [phone]          severe protein calorie malnutrition        _________________________________________________________________________________________  ========>>  M E D I C A L   A T T E N D I N G    F O L L O W  U P  N O T E  <<=========  -----------------------------------------------------------------------------------------------------    - Patient seen and examined by me earlier today.   - In summary,  ROMULO KOHLI is a 88y year old woman admitted post fall, pain, COVID   - Patient today overall doing ok, comfortable, eating OK. overall otherwise doing better      pt still with some c/o dizziness with movement . meclizine > continue ATC         encouraged pt to increase PO intake and hydration , OOB to chair daily ; will need rehab     ==================>> REVIEW OF SYSTEM <<=================    GEN: no fever, no chills, pain in left arm and leg improved   RESP: no SOB, no cough, no sputum  CVS: no chest pain, no palpitations, no edema  GI: no abdominal pain, no nausea, no constipation, no diarrhea  : no dysuria, no frequency  Neuro: no headache, ++ dizziness with movement   Derm : no itching, no rash    ==================>> PHYSICAL EXAM <<=================    GEN: A&O X 3 , NAD , comfortable, pleasant, calm in bed, cachectic    HEENT: NCAT, PERRL, MMM, hearing intact  Neck: supple , no JVD appreciated  CVS: S1S2 , regular , No M/R/G appreciated  PULM: CTA B/L,  no W/R/R appreciated  ABD.: soft. non tender, non distended,  bowel sounds present  Extrem: intact pulses , no edema   PSYCH : normal mood,  not anxious                               ( Note written / Date of service 07-18-22 )    ==================>> MEDICATIONS <<====================    amLODIPine   Tablet 10 milliGRAM(s) Oral daily  brimonidine 0.2% Ophthalmic Solution 1 Drop(s) Both EYES three times a day  dorzolamide 2%/timolol 0.5% Ophthalmic Solution 1 Drop(s) Both EYES two times a day  enoxaparin Injectable 40 milliGRAM(s) SubCutaneous every 24 hours  famotidine    Tablet 20 milliGRAM(s) Oral daily  latanoprost 0.005% Ophthalmic Solution 1 Drop(s) Both EYES at bedtime  levothyroxine 112 MICROGram(s) Oral daily  lisinopril 40 milliGRAM(s) Oral daily  meclizine 12.5 milliGRAM(s) Oral every 8 hours  multivitamin/minerals 1 Tablet(s) Oral daily  simvastatin 10 milliGRAM(s) Oral at bedtime  sodium chloride 0.9%. 1000 milliLiter(s) IV Continuous <Continuous>    MEDICATIONS  (PRN):  acetaminophen     Tablet .. 650 milliGRAM(s) Oral every 6 hours PRN Temp greater or equal to 38C (100.4F), Mild Pain (1 - 3)  aluminum hydroxide/magnesium hydroxide/simethicone Suspension 30 milliLiter(s) Oral every 4 hours PRN Dyspepsia  melatonin 3 milliGRAM(s) Oral at bedtime PRN Insomnia  ondansetron Injectable 4 milliGRAM(s) IV Push every 8 hours PRN Nausea and/or Vomiting    ___________  Active diet:  Diet, Regular:   Supplement Feeding Modality:  Oral  Ensure Max Cans or Servings Per Day:  2       Frequency:  Two Times a day  ___________________    ==================>> VITAL SIGNS <<==================    Vital Signs Last 24 HrsT(C): 36.7 (07-18-22 @ 13:19)  T(F): 98 (07-18-22 @ 13:19), Max: 98.4 (07-17-22 @ 21:01)  HR: 65 (07-18-22 @ 03:56) (65 - 68)  BP: 152/86 (07-18-22 @ 03:56)  RR: 18 (07-18-22 @ 13:19) (18 - 20)  SpO2: 96% (07-18-22 @ 13:19) (95% - 98%)       ==================>> LAB AND IMAGING <<==================                        14.2   6.93  )-----------( 300      ( 18 Jul 2022 07:25 )             43.6        07-18    141  |  104  |  14  ----------------------------<  88  3.8   |  27  |  0.94    Ca    9.6      18 Jul 2022 07:25  Phos  2.5     07-18  Mg     1.7     07-18      WBC count:   6.93 <<== ,  6.23 <<== ,  6.44 <<==   Hemoglobin:   14.2 <<==,  14.8 <<==,  14.2 <<==  platelets:  300 <==, 284 <==, 248 <==    Creatinine:  0.94  <<==, 0.74  <<==, 0.76  <<==, 0.87  <<==  Sodium:   141  <==, 139  <==, 140  <==, 141  <==       AST:          11 <== , 14 <==      ALT:        7  <== , 7  <==      AP:        61  <=, 58  <=     Bili:        0.4  <=, 0.4  <=    ____________________________    M I C R O B I O L O G Y :    Culture - Urine (collected 14 Jul 2022 15:21)  Source: Catheterized Catheterized  Final Report (18 Jul 2022 10:12):    >100,000 CFU/ml Klebsiella pneumoniae  Organism: Klebsiella pneumoniae (18 Jul 2022 10:12)  Organism: Klebsiella pneumoniae (18 Jul 2022 10:12)    Sensitivities:      -  Amikacin: S <=16      -  Amoxicillin/Clavulanic Acid: S <=8/4      -  Ampicillin: R >16 These ampicillin results predict results for amoxicillin      -  Ampicillin/Sulbactam: S <=4/2 Enterobacter, Klebsiella aerogenes, Citrobacter, and Serratia may develop resistance during prolonged therapy (3-4 days)      -  Aztreonam: S <=4      -  Cefazolin: S <=2 ([email or web address]) For uncomplicated UTI with K. pneumoniae, E. coli, or P. mirablis: LOLIS <=16 is sensitive and LOLIS >=32 is resistant. This also predicts results for oral agents cefaclor, cefdinir, cefpodoxime, cefprozil, cefuroxime axetil, cephalexin and locarbef for uncomplicated UTI. Note that some isolates may be susceptible to these agents while testing resistant to cefazolin.      -  Cefepime: S <=2      -  Cefoxitin: S <=8      -  Ceftriaxone: S <=1 Enterobacter, Klebsiella aerogenes, Citrobacter, and Serratia may develop resistance during prolonged therapy      -  Ciprofloxacin: S <=0.25      -  Ertapenem: S <=0.5      -  Gentamicin: S <=2      -  Imipenem: S <=1      -  Levofloxacin: S <=0.5      -  Meropenem: S <=1      -  Nitrofurantoin: S <=32 Should not be used to treat pyelonephritis      -  Piperacillin/Tazobactam: S <=8      -  Tigecycline: S <=2      -  Tobramycin: S <=2      -  Trimethoprim/Sulfamethoxazole: S <=0.5/9.5      Method Type: LOLIS      COVID-19 PCR: Detected (07-14-22 @ 15:19)      ___________________________________________________________________________________  ===============>>  A S S E S S M E N T   A N D   P L A N <<===============  ------------------------------------------------------------------------------------------    · Assessment	  Patient is an 89 yo woman with past medical history of HTN, HLD, Hypothyroidism. Glaucoma, osteoarthritis presents to ed after mechanical trip and fall getting out of bathroom.  pt lives by herself but her grand children live with her.. she woke u p this morning, went to bathroom , voided and was going back to bedroom when she may have tripped, twisted and fell on the floor and could not get up..  pt was unable to ambulates after falling due to pain in left leg  no fractures found  + small hematoma on Rt post scalp..  pt normally walks with bennett at home and walker outside the home     per report pt tested positive for covid on an in-home test recently   COVID-19 vaccine series completed w booster,  covid test positive here.. pt asymptomatic (except for chronic  ?smokers cough)       Problem/Plan - 1:  ·  Problem: Fall.   ·  Plan: appears to be mechanical fall . imbalance  pt with  some dizziness with movement >> meclizine ATC ordered   no signs of fracture on imaging  PT eval / OOB to chair daily as able   fall precautions  orthostatics positive  >> KARTIK stockings on   echo  encourage po intake and hydration  OOB to chair daily as able   will need rehab    Problem/Plan - 2:  ·  Problem: Pain of left knee and lower leg.   ·  Plan: possible contusion post fall  if not better / unable to walk, may need additional imaging to rule out fracture  PT / OOB   pain mgmt as needed.    Problem/Plan - 3:  ·  Problem: 2019 novel coronavirus disease (COVID-19).   ·  Plan: pt with recent positive in home test   pt not symptomatic ( has chronic on and off cough)  no additional testing / management needed  monitor  supportive care  nutrition, hydration  vitamins   infection control eval for possible DC of isolation ?    a Urin culture was sent  is positive .. sensitivities..  noted     will give 3 days of abx     Problem/Plan - 4:  ·  Problem: Ambulatory dysfunction.   ·  Plan: as above  PT.    Problem/Plan - 5:  ·  Problem: HTN (hypertension).   ·  Plan: hold HCTZ  Continue Current medications otherwise and monitor.  cardio to eval.    Problem/Plan - 6:  ·  Problem: Glaucoma.   ·  Plan: on multiple eye drops  Continue home medications.    -GI/DVT Prophylaxis per protocol.   lovenox   pepcid   nutritional supplement given severe protein calorie malnutrition    --------------------------------------------  Case discussed with pt.. RN, NP  Education given on findings and plan of care  ___________________________  H. GATITO Thompson.  Pager: [phone]          severe protein calorie malnutrition

## 2022-07-18 NOTE — DIETITIAN NUTRITION RISK NOTIFICATION - PHYSICAL ASSESSMENT DORSAL HAND
moderate Magy Venegas  (NP)  2018 11:03:27 Jules Fan  (Technician)  2018 20:20:43 Cynthia Carey  (RN)  2018 01:10:04

## 2022-07-18 NOTE — DIETITIAN INITIAL EVALUATION ADULT - PROBLEM/PLAN-5
Office Visit    Assessment & Plan:  (H35.372) Macular pucker, left  (primary encounter diagnosis)  Comment: Mild recurrence after macular surgery.  Confirmed with macular computerized imaging and fundal photography.  No significant macular edema at this time.  He is okay to get glasses.  Plan: SCANNING COMPUTERIZED OPHTHALMIC IMAGING         RETINA, FUNDAL PHOTOGRAPHY        See Dr. Sorto for glasses.    (Z96.1) Pseudophakia of left eye  Comment: Stable.  Plan: Observation.    (Z09) Follow-up examination after eye surgery  Comment: Doing well but has a mild conjunctivitis which seems to be responding to the Acular.  We'll continue the medication.  Plan: ketorolac (ACULAR) 0.4 % Solution        Recheck in about a month.    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  We'll start on medication and recheck in about a month.  Plan: azithromycin (ZITHROMAX) 250 MG tablet        Recheck in about a month.      CHIEF COMPLAINT    Chief Complaint   Patient presents with   • Referral     From Dr Sorto for visual acuity evaluation and possible infection left eye, he noted central vision not as sharp as peripheral has history of macular hole, has redness, irritation ,and dry feeling to this eye         History of present illness    He is here today for redness which tends to fluctuate in the left eye with burning, irritation, dry feeling in the left eye.  Seemed to get worse couple of weeks after stopping his postoperative drops.  Vision somewhat blurry in the left eye.      I have reviewed the past medical, family and social history sections including the medications and allergies listed in the above medical record as well as the nursing notes.     Review of systems    All other review of systems negative, except for those noted in HPI.    Physical Exam    Vital Signs:  There were no vitals filed for this visit.    Visual Acuity (Snellen - Linear)      Right Left   Dist sc 20/40 20/70   Dist ph sc   20/60             Main Ophthalmology Exam     External Exam      Right Left    External Rosacea Rosacea      Slit Lamp Exam      Right Left    Lids/Lashes 2+ Meibomian gland dysfunction 2+ Meibomian gland dysfunction    Conjunctiva/Sclera Clear 1+ Injection    Cornea Clear Clear    Anterior Chamber Deep and quiet Deep and quiet    Iris Round and regular Round and regular    Lens 1+ Cortical cataract, 1+ Nuclear sclerosis, 1+ Posterior subcapsular cataract pseudophakia, Centered posterior chamber intraocular lens, stable    Vitreous Clear Clear      Fundus Exam      Right Left    Disc Flat, pink with a healthy rim Flat, pink with a healthy rim    C/D Ratio 0.4 0.4    Macula Membrane Membrane    Vessels Healthy with normal Artery-Vein ratio Healthy with normal Artery-Vein ratio    Periphery Flat, healthy, without tears or detachments Flat, healthy, without tears or detachments    Imaging results under procedures                Please refer to full Ophthalmic Exam within the encounter for additional information.    The patient indicates understanding of these issues and agrees with the plan.            DISPLAY PLAN FREE TEXT

## 2022-07-18 NOTE — DIETITIAN NUTRITION RISK NOTIFICATION - TREATMENT: THE FOLLOWING DIET HAS BEEN RECOMMENDED
Diet, Regular:   Supplement Feeding Modality:  Oral  Ensure Max Cans or Servings Per Day:  2       Frequency:  Two Times a day (07-14-22 @ 21:41) [Active]

## 2022-07-19 RX ORDER — CIPROFLOXACIN LACTATE 400MG/40ML
250 VIAL (ML) INTRAVENOUS EVERY 12 HOURS
Refills: 0 | Status: COMPLETED | OUTPATIENT
Start: 2022-07-19 | End: 2022-07-21

## 2022-07-19 RX ADMIN — Medication 650 MILLIGRAM(S): at 04:51

## 2022-07-19 RX ADMIN — DORZOLAMIDE HYDROCHLORIDE TIMOLOL MALEATE 1 DROP(S): 20; 5 SOLUTION/ DROPS OPHTHALMIC at 06:22

## 2022-07-19 RX ADMIN — Medication 112 MICROGRAM(S): at 06:20

## 2022-07-19 RX ADMIN — BRIMONIDINE TARTRATE 1 DROP(S): 2 SOLUTION/ DROPS OPHTHALMIC at 21:45

## 2022-07-19 RX ADMIN — Medication 1 TABLET(S): at 12:10

## 2022-07-19 RX ADMIN — BRIMONIDINE TARTRATE 1 DROP(S): 2 SOLUTION/ DROPS OPHTHALMIC at 06:22

## 2022-07-19 RX ADMIN — BRIMONIDINE TARTRATE 1 DROP(S): 2 SOLUTION/ DROPS OPHTHALMIC at 14:31

## 2022-07-19 RX ADMIN — Medication 12.5 MILLIGRAM(S): at 06:21

## 2022-07-19 RX ADMIN — LATANOPROST 1 DROP(S): 0.05 SOLUTION/ DROPS OPHTHALMIC; TOPICAL at 21:46

## 2022-07-19 RX ADMIN — Medication 12.5 MILLIGRAM(S): at 21:45

## 2022-07-19 RX ADMIN — DORZOLAMIDE HYDROCHLORIDE TIMOLOL MALEATE 1 DROP(S): 20; 5 SOLUTION/ DROPS OPHTHALMIC at 18:02

## 2022-07-19 RX ADMIN — ENOXAPARIN SODIUM 40 MILLIGRAM(S): 100 INJECTION SUBCUTANEOUS at 18:03

## 2022-07-19 RX ADMIN — Medication 250 MILLIGRAM(S): at 18:02

## 2022-07-19 RX ADMIN — Medication 12.5 MILLIGRAM(S): at 14:33

## 2022-07-19 RX ADMIN — SIMVASTATIN 10 MILLIGRAM(S): 20 TABLET, FILM COATED ORAL at 21:45

## 2022-07-19 RX ADMIN — AMLODIPINE BESYLATE 10 MILLIGRAM(S): 2.5 TABLET ORAL at 06:20

## 2022-07-19 RX ADMIN — LISINOPRIL 40 MILLIGRAM(S): 2.5 TABLET ORAL at 06:21

## 2022-07-19 RX ADMIN — FAMOTIDINE 20 MILLIGRAM(S): 10 INJECTION INTRAVENOUS at 12:10

## 2022-07-19 RX ADMIN — Medication 250 MILLIGRAM(S): at 06:21

## 2022-07-19 RX ADMIN — Medication 3 MILLIGRAM(S): at 00:20

## 2022-07-19 RX ADMIN — Medication 650 MILLIGRAM(S): at 00:20

## 2022-07-19 NOTE — CHART NOTE - NSCHARTNOTEFT_GEN_A_CORE
Due to patient's deconditioning and generalized weakness secondary to left hip pain, patient will require transport chair. Patient unable to self propel in a standard wheelchair. This is necessary to achieve daily tasks and therapies which cannot be achieved with the use of a cane or rolling walker. Patient and family is in agreement with wheelchair use at home and assistance will be provided if needed

## 2022-07-19 NOTE — PROGRESS NOTE ADULT - ASSESSMENT
_________________________________________________________________________________________  ========>>  M E D I C A L   A T T E N D I N G    F O L L O W  U P  N O T E  <<=========  -----------------------------------------------------------------------------------------------------    - Patient seen and examined by me earlier today.   - In summary,  ROMULO KOHLI is a 88y year old woman admitted post fall, pain, COVID   - Patient today overall doing ok, comfortable, eating OK. overall otherwise doing better      pt still with some c/o dizziness with movement . meclizine > continue ATC, encourage to keep well hydrated, TEDs on          encouraged pt to increase PO intake and hydration , OOB to chair daily ; will need rehab     ==================>> REVIEW OF SYSTEM <<=================    GEN: no fever, no chills, no pain reported   RESP: no SOB, no cough, no sputum  CVS: no chest pain, no palpitations, no edema  GI: no abdominal pain, no nausea, no constipation, no diarrhea  : no dysuria, no frequency  Neuro: no headache, ++ dizziness with movement   Derm : no itching, no rash    ==================>> PHYSICAL EXAM <<=================    GEN: A&O X 3 , NAD , comfortable, pleasant, calm in bed, cachectic    HEENT: NCAT, PERRL, MMM, hearing intact  Neck: supple , no JVD appreciated  CVS: S1S2 , regular , No M/R/G appreciated  PULM: CTA B/L,  no W/R/R appreciated  ABD.: soft. non tender, non distended,  bowel sounds present  Extrem: intact pulses , no edema   PSYCH : normal mood,  not anxious                             ( note written / Date of service   07-19-22 )    ==================>> MEDICATIONS <<====================    amLODIPine   Tablet 10 milliGRAM(s) Oral daily  brimonidine 0.2% Ophthalmic Solution 1 Drop(s) Both EYES three times a day  ciprofloxacin     Tablet 250 milliGRAM(s) Oral every 12 hours  dorzolamide 2%/timolol 0.5% Ophthalmic Solution 1 Drop(s) Both EYES two times a day  enoxaparin Injectable 40 milliGRAM(s) SubCutaneous every 24 hours  famotidine    Tablet 20 milliGRAM(s) Oral daily  latanoprost 0.005% Ophthalmic Solution 1 Drop(s) Both EYES at bedtime  levothyroxine 112 MICROGram(s) Oral daily  lisinopril 40 milliGRAM(s) Oral daily  meclizine 12.5 milliGRAM(s) Oral every 8 hours  multivitamin/minerals 1 Tablet(s) Oral daily  simvastatin 10 milliGRAM(s) Oral at bedtime  sodium chloride 0.9%. 1000 milliLiter(s) IV Continuous <Continuous>    MEDICATIONS  (PRN):  acetaminophen     Tablet .. 650 milliGRAM(s) Oral every 6 hours PRN Temp greater or equal to 38C (100.4F), Mild Pain (1 - 3)  aluminum hydroxide/magnesium hydroxide/simethicone Suspension 30 milliLiter(s) Oral every 4 hours PRN Dyspepsia  melatonin 3 milliGRAM(s) Oral at bedtime PRN Insomnia  ondansetron Injectable 4 milliGRAM(s) IV Push every 8 hours PRN Nausea and/or Vomiting    ___________  Active diet:  Diet, Regular:   Supplement Feeding Modality:  Oral  Ensure Max Cans or Servings Per Day:  2       Frequency:  Two Times a day  ___________________    ==================>> VITAL SIGNS <<==================     Vital Signs Last 24 HrsT(C): 36.7 (07-19-22 @ 04:45)  T(F): 98 (07-19-22 @ 04:45), Max: 98.2 (07-18-22 @ 19:30)  HR: 74 (07-19-22 @ 09:07) (67 - 74)  BP: 126/72 (07-19-22 @ 09:07)  RR: 18 (07-19-22 @ 04:45) (18 - 18)  SpO2: 93% (07-19-22 @ 09:07) (93% - 94%)       ==================>> LAB AND IMAGING <<==================                        14.2   6.93  )-----------( 300      ( 18 Jul 2022 07:25 )             43.6        07-18    141  |  104  |  14  ----------------------------<  88  3.8   |  27  |  0.94    Ca    9.6      18 Jul 2022 07:25  Phos  2.5     07-18  Mg     1.7     07-18      ____________________________    M I C R O B I O L O G Y :    Culture - Urine (collected 14 Jul 2022 15:21)  Source: Catheterized Catheterized  Final Report (18 Jul 2022 10:12):    >100,000 CFU/ml Klebsiella pneumoniae  Organism: Klebsiella pneumoniae (18 Jul 2022 10:12)  Organism: Klebsiella pneumoniae (18 Jul 2022 10:12)    Sensitivities:      -  Amikacin: S <=16      -  Amoxicillin/Clavulanic Acid: S <=8/4      -  Ampicillin: R >16 These ampicillin results predict results for amoxicillin      -  Ampicillin/Sulbactam: S <=4/2 Enterobacter, Klebsiella aerogenes, Citrobacter, and Serratia may develop resistance during prolonged therapy (3-4 days)      -  Aztreonam: S <=4      -  Cefazolin: S <=2 (MIC_CL_COM_ENTERIC_CEFAZU) For uncomplicated UTI with K. pneumoniae, E. coli, or P. mirablis: LOLIS <=16 is sensitive and LOLIS >=32 is resistant. This also predicts results for oral agents cefaclor, cefdinir, cefpodoxime, cefprozil, cefuroxime axetil, cephalexin and locarbef for uncomplicated UTI. Note that some isolates may be susceptible to these agents while testing resistant to cefazolin.      -  Cefepime: S <=2      -  Cefoxitin: S <=8      -  Ceftriaxone: S <=1 Enterobacter, Klebsiella aerogenes, Citrobacter, and Serratia may develop resistance during prolonged therapy      -  Ciprofloxacin: S <=0.25      -  Ertapenem: S <=0.5      -  Gentamicin: S <=2      -  Imipenem: S <=1      -  Levofloxacin: S <=0.5      -  Meropenem: S <=1      -  Nitrofurantoin: S <=32 Should not be used to treat pyelonephritis      -  Piperacillin/Tazobactam: S <=8      -  Tigecycline: S <=2      -  Tobramycin: S <=2      -  Trimethoprim/Sulfamethoxazole: S <=0.5/9.5      Method Type: LOLIS      COVID-19 PCR: Detected (07-14-22 @ 15:19)      ___________________________________________________________________________________  ===============>>  A S S E S S M E N T   A N D   P L A N <<===============  ------------------------------------------------------------------------------------------    · Assessment	  Patient is an 89 yo woman with past medical history of HTN, HLD, Hypothyroidism. Glaucoma, osteoarthritis presents to ed after mechanical trip and fall getting out of bathroom.  pt lives by herself but her grand children live with her.. she woke u p this morning, went to bathroom , voided and was going back to bedroom when she may have tripped, twisted and fell on the floor and could not get up..  pt was unable to ambulates after falling due to pain in left leg  no fractures found  + small hematoma on Rt post scalp..  pt normally walks with bennett at home and walker outside the home     per report pt tested positive for covid on an in-home test recently   COVID-19 vaccine series completed w booster,  covid test positive here.. pt asymptomatic (except for chronic  ?smokers cough)       Problem/Plan - 1:  ·  Problem: Fall.   ·  Plan: appears to be mechanical fall . imbalance  pt with  some dizziness with movement >> meclizine ATC ordered   no signs of fracture on imaging  PT eval / OOB to chair daily as able   fall precautions  orthostatics positive  >> KARTIK stockings on   echo  encourage po intake and hydration  OOB to chair daily as able   will need rehab    Problem/Plan - 2:  ·  Problem: Pain of left knee and lower leg.   ·  Plan: possible contusion post fall  if not better / unable to walk, may need additional imaging to rule out fracture  PT / OOB   pain mgmt as needed.    Problem/Plan - 3:  ·  Problem: 2019 novel coronavirus disease (COVID-19).   ·  Plan: pt with recent positive in home test   pt not symptomatic ( has chronic on and off cough)  no additional testing / management needed  monitor  supportive care  nutrition, hydration  vitamins   infection control eval for possible DC of isolation ?    a Urin culture was sent  is positive .. sensitivities..  noted     will give 3 days of abx     Problem/Plan - 4:  ·  Problem: Ambulatory dysfunction.   ·  Plan: as above  PT.    Problem/Plan - 5:  ·  Problem: HTN (hypertension).   ·  Plan: hold HCTZ  Continue Current medications otherwise and monitor.  cardio to eval.    Problem/Plan - 6:  ·  Problem: Glaucoma.   ·  Plan: on multiple eye drops  Continue home medications.    -GI/DVT Prophylaxis per protocol.   lovenox   pepcid   nutritional supplement given severe protein calorie malnutrition    --------------------------------------------  Case discussed with pt..  NP  Education given on findings and plan of care  ___________________________  H. GATITO Thompson.  Pager: [phone]          severe protein calorie malnutrition

## 2022-07-20 PROCEDURE — 93308 TTE F-UP OR LMTD: CPT | Mod: 26

## 2022-07-20 PROCEDURE — 93321 DOPPLER ECHO F-UP/LMTD STD: CPT | Mod: 26

## 2022-07-20 RX ADMIN — Medication 12.5 MILLIGRAM(S): at 06:03

## 2022-07-20 RX ADMIN — ENOXAPARIN SODIUM 40 MILLIGRAM(S): 100 INJECTION SUBCUTANEOUS at 17:45

## 2022-07-20 RX ADMIN — LISINOPRIL 40 MILLIGRAM(S): 2.5 TABLET ORAL at 06:01

## 2022-07-20 RX ADMIN — BRIMONIDINE TARTRATE 1 DROP(S): 2 SOLUTION/ DROPS OPHTHALMIC at 22:08

## 2022-07-20 RX ADMIN — Medication 250 MILLIGRAM(S): at 06:00

## 2022-07-20 RX ADMIN — Medication 112 MICROGRAM(S): at 06:01

## 2022-07-20 RX ADMIN — FAMOTIDINE 20 MILLIGRAM(S): 10 INJECTION INTRAVENOUS at 13:11

## 2022-07-20 RX ADMIN — BRIMONIDINE TARTRATE 1 DROP(S): 2 SOLUTION/ DROPS OPHTHALMIC at 13:13

## 2022-07-20 RX ADMIN — Medication 1 TABLET(S): at 13:12

## 2022-07-20 RX ADMIN — Medication 250 MILLIGRAM(S): at 17:45

## 2022-07-20 RX ADMIN — BRIMONIDINE TARTRATE 1 DROP(S): 2 SOLUTION/ DROPS OPHTHALMIC at 06:04

## 2022-07-20 RX ADMIN — LATANOPROST 1 DROP(S): 0.05 SOLUTION/ DROPS OPHTHALMIC; TOPICAL at 22:08

## 2022-07-20 RX ADMIN — Medication 650 MILLIGRAM(S): at 06:00

## 2022-07-20 RX ADMIN — SIMVASTATIN 10 MILLIGRAM(S): 20 TABLET, FILM COATED ORAL at 22:08

## 2022-07-20 RX ADMIN — Medication 12.5 MILLIGRAM(S): at 13:12

## 2022-07-20 RX ADMIN — DORZOLAMIDE HYDROCHLORIDE TIMOLOL MALEATE 1 DROP(S): 20; 5 SOLUTION/ DROPS OPHTHALMIC at 06:06

## 2022-07-20 RX ADMIN — AMLODIPINE BESYLATE 10 MILLIGRAM(S): 2.5 TABLET ORAL at 06:00

## 2022-07-20 RX ADMIN — Medication 12.5 MILLIGRAM(S): at 22:08

## 2022-07-20 RX ADMIN — DORZOLAMIDE HYDROCHLORIDE TIMOLOL MALEATE 1 DROP(S): 20; 5 SOLUTION/ DROPS OPHTHALMIC at 17:45

## 2022-07-20 NOTE — PROGRESS NOTE ADULT - ASSESSMENT
_________________________________________________________________________________________  ========>>  M E D I C A L   A T T E N D I N G    F O L L O W  U P  N O T E  <<=========  -----------------------------------------------------------------------------------------------------    - Patient seen and examined by me earlier today.   - In summary,  ROMULO KOHLI is a 88y year old woman admitted post fall, pain, COVID   - Patient today overall doing ok, comfortable, eating OK. overall otherwise doing better        some c/o dizziness / lightheadedness with movement . meclizine > continue ATC, encourage to keep well hydrated, TEDs on          encouraged pt to increase PO intake and hydration , OOB to chair daily     ==================>> REVIEW OF SYSTEM <<=================    GEN: no fever, no chills, no pain reported   RESP: no SOB, no cough, no sputum  CVS: no chest pain, no palpitations, no edema  GI: no abdominal pain, no nausea, no constipation, no diarrhea  : no dysuria, no frequency  Neuro: no headache, dizziness with movement  as above   Derm : no itching, no rash    ==================>> PHYSICAL EXAM <<=================    GEN: A&O X 3 , NAD , comfortable, pleasant, calm in bed, cachectic    HEENT: NCAT, PERRL, MMM, hearing intact  Neck: supple , no JVD appreciated  CVS: S1S2 , regular , No M/R/G appreciated  PULM: CTA B/L,  no W/R/R appreciated  ABD.: soft. non tender, non distended,  bowel sounds present  Extrem: intact pulses , no edema   PSYCH : normal mood,  not anxious                              ( note written / Date of service   07-20-22 )    ==================>> MEDICATIONS <<====================    amLODIPine   Tablet 10 milliGRAM(s) Oral daily  brimonidine 0.2% Ophthalmic Solution 1 Drop(s) Both EYES three times a day  ciprofloxacin     Tablet 250 milliGRAM(s) Oral every 12 hours  dorzolamide 2%/timolol 0.5% Ophthalmic Solution 1 Drop(s) Both EYES two times a day  enoxaparin Injectable 40 milliGRAM(s) SubCutaneous every 24 hours  famotidine    Tablet 20 milliGRAM(s) Oral daily  latanoprost 0.005% Ophthalmic Solution 1 Drop(s) Both EYES at bedtime  levothyroxine 112 MICROGram(s) Oral daily  lisinopril 40 milliGRAM(s) Oral daily  meclizine 12.5 milliGRAM(s) Oral every 8 hours  multivitamin/minerals 1 Tablet(s) Oral daily  simvastatin 10 milliGRAM(s) Oral at bedtime  sodium chloride 0.9%. 1000 milliLiter(s) IV Continuous <Continuous>    MEDICATIONS  (PRN):  acetaminophen     Tablet .. 650 milliGRAM(s) Oral every 6 hours PRN Temp greater or equal to 38C (100.4F), Mild Pain (1 - 3)  aluminum hydroxide/magnesium hydroxide/simethicone Suspension 30 milliLiter(s) Oral every 4 hours PRN Dyspepsia  melatonin 3 milliGRAM(s) Oral at bedtime PRN Insomnia  ondansetron Injectable 4 milliGRAM(s) IV Push every 8 hours PRN Nausea and/or Vomiting    ___________  Active diet:  Diet, Regular:   Supplement Feeding Modality:  Oral  Ensure Max Cans or Servings Per Day:  2       Frequency:  Two Times a day  ___________________    ==================>> VITAL SIGNS <<==================     Vital Signs Last 24 HrsT(C): 36.7 (07-20-22 @ 09:06)  T(F): 98.1 (07-20-22 @ 09:06), Max: 99.1 (07-20-22 @ 05:12)  HR: 60 (07-20-22 @ 09:06) (60 - 75)  BP: 112/67 (07-20-22 @ 09:09)  RR: 18 (07-20-22 @ 09:06) (18 - 18)  SpO2: 95% (07-20-22 @ 09:06) (95% - 97%)         ==================>> LAB AND IMAGING <<==================    no labs today   echo still pending     ___________________________________________________________________________________  ===============>>  A S S E S S M E N T   A N D   P L A N <<===============  ------------------------------------------------------------------------------------------    · Assessment	  Patient is an 89 yo woman with past medical history of HTN, HLD, Hypothyroidism. Glaucoma, osteoarthritis presents to ed after mechanical trip and fall getting out of bathroom.  pt lives by herself but her grand children live with her.. she woke u p this morning, went to bathroom , voided and was going back to bedroom when she may have tripped, twisted and fell on the floor and could not get up..  pt was unable to ambulates after falling due to pain in left leg  no fractures found  + small hematoma on Rt post scalp..  pt normally walks with bennett at home and walker outside the home     per report pt tested positive for covid on an in-home test recently   COVID-19 vaccine series completed w booster,  covid test positive here.. pt asymptomatic       Problem/Plan - 1:  ·  Problem: Fall.   ·  Plan: appears to be mechanical fall . imbalance  pt with  some dizziness with movement >> meclizine ATC ordered   no signs of fracture on imaging  PT  / OOB to chair daily as able emphasized   fall precautions  orthostatics positive  >> KARTIK stockings on   echo  encourage po intake and hydration  would benefit from rehab but family has decided against >> CM working on home care / equipment...     Problem/Plan - 2:  ·  Problem: 2019 novel coronavirus disease (COVID-19).   ·  Plan: pt with recent positive in home test   pt not symptomatic ( has chronic on and off cough)  no additional testing / management needed  monitor  supportive care  nutrition, hydration  vitamins   DC of isolation     a Urine culture was sent  is positive .. sensitivities..  noted     will give 3 days of abx  > DC it as planned     Problem/Plan - 3:  ·  Problem: Ambulatory dysfunction.   ·  Plan: as above  PT.    Problem/Plan - 4:  ·  Problem: HTN (hypertension).   ·  Plan: hold HCTZ  Continue Current medications otherwise and monitor.  cardio to eval.    Problem/Plan - 5:  ·  Problem: Glaucoma.   ·  Plan: on multiple eye drops  Continue home medications.    -GI/DVT Prophylaxis per protocol.   lovenox   pepcid   nutritional supplement given severe protein calorie malnutrition    --------------------------------------------  Case discussed with pt..  NP  Education given on findings and plan of care  ___________________________  H. GATITO Thompson.  Pager: [phone]          severe protein calorie malnutrition

## 2022-07-21 ENCOUNTER — TRANSCRIPTION ENCOUNTER (OUTPATIENT)
Age: 87
End: 2022-07-21

## 2022-07-21 RX ORDER — ASPIRIN/CALCIUM CARB/MAGNESIUM 324 MG
81 TABLET ORAL DAILY
Refills: 0 | Status: DISCONTINUED | OUTPATIENT
Start: 2022-07-21 | End: 2022-07-27

## 2022-07-21 RX ORDER — ACETAMINOPHEN 500 MG
2 TABLET ORAL
Qty: 0 | Refills: 0 | DISCHARGE
Start: 2022-07-21

## 2022-07-21 RX ORDER — MECLIZINE HCL 12.5 MG
1 TABLET ORAL
Qty: 90 | Refills: 0
Start: 2022-07-21 | End: 2022-08-19

## 2022-07-21 RX ADMIN — SIMVASTATIN 10 MILLIGRAM(S): 20 TABLET, FILM COATED ORAL at 21:10

## 2022-07-21 RX ADMIN — Medication 3 MILLIGRAM(S): at 21:10

## 2022-07-21 RX ADMIN — BRIMONIDINE TARTRATE 1 DROP(S): 2 SOLUTION/ DROPS OPHTHALMIC at 13:24

## 2022-07-21 RX ADMIN — LISINOPRIL 40 MILLIGRAM(S): 2.5 TABLET ORAL at 05:10

## 2022-07-21 RX ADMIN — DORZOLAMIDE HYDROCHLORIDE TIMOLOL MALEATE 1 DROP(S): 20; 5 SOLUTION/ DROPS OPHTHALMIC at 05:11

## 2022-07-21 RX ADMIN — Medication 81 MILLIGRAM(S): at 13:21

## 2022-07-21 RX ADMIN — ENOXAPARIN SODIUM 40 MILLIGRAM(S): 100 INJECTION SUBCUTANEOUS at 17:40

## 2022-07-21 RX ADMIN — Medication 250 MILLIGRAM(S): at 05:10

## 2022-07-21 RX ADMIN — Medication 12.5 MILLIGRAM(S): at 21:11

## 2022-07-21 RX ADMIN — FAMOTIDINE 20 MILLIGRAM(S): 10 INJECTION INTRAVENOUS at 13:20

## 2022-07-21 RX ADMIN — DORZOLAMIDE HYDROCHLORIDE TIMOLOL MALEATE 1 DROP(S): 20; 5 SOLUTION/ DROPS OPHTHALMIC at 17:41

## 2022-07-21 RX ADMIN — Medication 250 MILLIGRAM(S): at 17:41

## 2022-07-21 RX ADMIN — BRIMONIDINE TARTRATE 1 DROP(S): 2 SOLUTION/ DROPS OPHTHALMIC at 21:11

## 2022-07-21 RX ADMIN — LATANOPROST 1 DROP(S): 0.05 SOLUTION/ DROPS OPHTHALMIC; TOPICAL at 21:12

## 2022-07-21 RX ADMIN — Medication 12.5 MILLIGRAM(S): at 05:10

## 2022-07-21 RX ADMIN — Medication 112 MICROGRAM(S): at 05:11

## 2022-07-21 RX ADMIN — Medication 12.5 MILLIGRAM(S): at 13:20

## 2022-07-21 RX ADMIN — Medication 1 TABLET(S): at 13:19

## 2022-07-21 RX ADMIN — BRIMONIDINE TARTRATE 1 DROP(S): 2 SOLUTION/ DROPS OPHTHALMIC at 05:14

## 2022-07-21 RX ADMIN — AMLODIPINE BESYLATE 10 MILLIGRAM(S): 2.5 TABLET ORAL at 05:10

## 2022-07-21 NOTE — DISCHARGE NOTE PROVIDER - NSDCFUADDAPPT_GEN_ALL_CORE_FT
APPTS ARE READY TO BE MADE: [X ] YES    Best Family or Patient Contact (if needed):    Additional Information about above appointments (if needed):    1: [place] Leija - 1 week   2: Meggan 	- [place] week   3:     Other comments or requests:

## 2022-07-21 NOTE — DISCHARGE NOTE PROVIDER - DETAILS OF MALNUTRITION DIAGNOSIS/DIAGNOSES
This patient has been assessed with a concern for Malnutrition and was treated during this hospitalization for the following Nutrition diagnosis/diagnoses:     -  07/18/2022: Moderate protein-calorie malnutrition

## 2022-07-21 NOTE — DISCHARGE NOTE PROVIDER - NSDCCPCAREPLAN_GEN_ALL_CORE_FT
PRINCIPAL DISCHARGE DIAGNOSIS  Diagnosis: Left hip pain  Assessment and Plan of Treatment:       SECONDARY DISCHARGE DIAGNOSES  Diagnosis: 2019 novel coronavirus disease (COVID-19)  Assessment and Plan of Treatment:     Diagnosis: Fall  Assessment and Plan of Treatment:      PRINCIPAL DISCHARGE DIAGNOSIS  Diagnosis: Fall  Assessment and Plan of Treatment: You fell at home. You have no fractures on imaging.   You will go to rehab to get stronger.         SECONDARY DISCHARGE DIAGNOSES  Diagnosis: 2019 novel coronavirus disease (COVID-19)  Assessment and Plan of Treatment: Please take Xarelto 10 mg daily for 30 days.  Once Xarelto is completed can resume aspirin.    Diagnosis: Glaucoma  Assessment and Plan of Treatment: Please continue taking your home meds    Diagnosis: HTN (hypertension)  Assessment and Plan of Treatment: Please hold HCTZ  Continue Current medications otherwise and monitor.  AAA monitoring as outpatient with your cardiologist in 6 months   repeat routine echo as outpatient to monitor pulmonary arterial hypertension.

## 2022-07-21 NOTE — DISCHARGE NOTE PROVIDER - CARE PROVIDER_API CALL
Luann Thompson (DO)  Internal Medicine  [address] [place] [address]  Phone: [phone]  Fax: [phone]  Follow Up Time: 2 weeks    PMD,   Phone: (   )    -  Fax: (   )    -  Follow Up Time: 1 week   Chucho Broderick  [address] [place] [address]  Phone: [phone]  Fax: [phone]  Scheduled Appointment: 08/09/2022 01:00 PM   Heladio Onofre  [facility]  [address] [place] [address]  Phone: [phone]  Fax: [phone]  Follow Up Time: 1 week    Gisele Broderick  CARDIOVASCULAR DISEASE  [address] [facility], [address] [address]  Phone: [phone]  Fax: [phone]  Follow Up Time: 1 week

## 2022-07-21 NOTE — DISCHARGE NOTE PROVIDER - NSDCCAREPROVSEEN_GEN_ALL_CORE_FT
Luann Thompson Faraidoon D Luann Pierce  Ordering Physician  [facility] Medicine      [ Greater than 35 min spent for discharge services.   RYNE Thompson ]

## 2022-07-21 NOTE — DISCHARGE NOTE PROVIDER - PROVIDER TOKENS
PROVIDER:[TOKEN:[[id number]],FOLLOWUP:[2 weeks]],FREE:[LAST:[PMD],PHONE:[(   )    -],FAX:[(   )    -],FOLLOWUP:[1 week]] FREE:[LAST:[Meggan],FIRST:[Chucho],PHONE:[phone]],FAX:[phone]],ADDRESS:[[address] [place] [address]],SCHEDULEDAPPT:[08/09/2022],SCHEDULEDAPPTTIME:[01:00 PM]] PROVIDER:[TOKEN:[[id number]],FOLLOWUP:[1 week]],PROVIDER:[TOKEN:[[id number]],FOLLOWUP:[1 week]]

## 2022-07-21 NOTE — PROGRESS NOTE ADULT - ASSESSMENT
_________________________________________________________________________________________  ========>>  M E D I C A L   A T T E N D I N G    F O L L O W  U P  N O T E  <<=========  -----------------------------------------------------------------------------------------------------    - Patient evaluated by me, chart reviewed.   - In summary,  ROMULO KOHLI is a 88y year old woman admitted post fall, pain, COVID   - Patient today overall doing ok, comfortable, eating OK. overall otherwise doing better        lightheadedness with movement . meclizine > continue ATC, encourage to keep well hydrated, TEDs on  ( JHONATAN declined as noted )          encouraged pt to increase PO intake and hydration , OOB to chair daily     ==================>> REVIEW OF SYSTEM <<=================    GEN: no fever, no chills, no pain reported   RESP: no SOB, no cough, no sputum  CVS: no chest pain, no palpitations, no edema  GI: no abdominal pain, no nausea, no constipation, no diarrhea  : no dysuria, no frequency  Neuro: no headache, dizziness with movement  as above   Derm : no itching, no rash    ==================>> PHYSICAL EXAM <<=================    GEN: A&O X 3 , NAD , comfortable, pleasant, calm in bed, cachectic    HEENT: NCAT, PERRL, MMM, hearing intact  Neck: supple , no JVD appreciated  CVS: S1S2 , regular , No M/R/G appreciated  PULM: CTA B/L,  no W/R/R appreciated  ABD.: soft. non tender, non distended,  bowel sounds present  Extrem: intact pulses , no edema   PSYCH : normal mood,  not anxious                             ( Note written / Date of service 07-21-22 )    ==================>> MEDICATIONS <<====================    amLODIPine   Tablet 10 milliGRAM(s) Oral daily  aspirin enteric coated 81 milliGRAM(s) Oral daily  brimonidine 0.2% Ophthalmic Solution 1 Drop(s) Both EYES three times a day  ciprofloxacin     Tablet 250 milliGRAM(s) Oral every 12 hours  dorzolamide 2%/timolol 0.5% Ophthalmic Solution 1 Drop(s) Both EYES two times a day  enoxaparin Injectable 40 milliGRAM(s) SubCutaneous every 24 hours  famotidine    Tablet 20 milliGRAM(s) Oral daily  latanoprost 0.005% Ophthalmic Solution 1 Drop(s) Both EYES at bedtime  levothyroxine 112 MICROGram(s) Oral daily  lisinopril 40 milliGRAM(s) Oral daily  meclizine 12.5 milliGRAM(s) Oral every 8 hours  multivitamin/minerals 1 Tablet(s) Oral daily  simvastatin 10 milliGRAM(s) Oral at bedtime  sodium chloride 0.9%. 1000 milliLiter(s) IV Continuous <Continuous>    MEDICATIONS  (PRN):  acetaminophen     Tablet .. 650 milliGRAM(s) Oral every 6 hours PRN Temp greater or equal to 38C (100.4F), Mild Pain (1 - 3)  aluminum hydroxide/magnesium hydroxide/simethicone Suspension 30 milliLiter(s) Oral every 4 hours PRN Dyspepsia  melatonin 3 milliGRAM(s) Oral at bedtime PRN Insomnia  ondansetron Injectable 4 milliGRAM(s) IV Push every 8 hours PRN Nausea and/or Vomiting    ___________  Active diet:  Diet, Regular:   Supplement Feeding Modality:  Oral  Ensure Max Cans or Servings Per Day:  2       Frequency:  Two Times a day  ___________________    ==================>> VITAL SIGNS <<==================    Vital Signs Last 24 HrsT(C): 36.5 (07-21-22 @ 16:48)  T(F): 97.7 (07-21-22 @ 16:48), Max: 98.6 (07-21-22 @ 05:27)  HR: 65 (07-21-22 @ 16:48) (56 - 70)  BP: 128/77 (07-21-22 @ 16:48)  RR: 18 (07-21-22 @ 16:48) (18 - 18)  SpO2: 92% (07-21-22 @ 16:48) (92% - 96%)      ==================>> LAB AND IMAGING <<==================       no labs today     < from: Limited Transthoracic Echo (07.20.22 @ 15:55) >  Conclusions:  Small, hyperdynamic left ventricle.  Moderate pulmonary hypertnesion.  < end of copied text >  ___________________________________________________________________________________  ===============>>  A S S E S S M E N T   A N D   P L A N <<===============  ------------------------------------------------------------------------------------------    · Assessment	  Patient is an 89 yo woman with past medical history of HTN, HLD, Hypothyroidism. Glaucoma, osteoarthritis presents to ed after mechanical trip and fall getting out of bathroom.  pt lives by herself but her grand children live with her.. she woke u p this morning, went to bathroom , voided and was going back to bedroom when she may have tripped, twisted and fell on the floor and could not get up..  pt was unable to ambulates after falling due to pain in left leg  no fractures found  + small hematoma on Rt post scalp..  pt normally walks with bennett at home and walker outside the home     per report pt tested positive for covid on an in-home test recently   COVID-19 vaccine series completed w booster,  covid test positive here.. pt asymptomatic       Problem/Plan - 1:  ·  Problem: Fall.   ·  Plan: appears to be mechanical fall . imbalance  pt with  some dizziness with movement >> meclizine ATC ordered   no signs of fracture on imaging  PT  / OOB to chair daily as able emphasized   fall precautions  orthostatics positive  >> KARTIK stockings on   echo  encourage po intake and hydration  would benefit from rehab but family has decided against >> CM working on home care / equipment...     Problem/Plan - 2:  ·  Problem: 2019 novel coronavirus disease (COVID-19).   ·  Plan: pt with recent positive in home test   pt not symptomatic ( has chronic on and off cough)  no additional testing / management needed  monitor  supportive care  nutrition, hydration  vitamins   DC of isolation     a Urine culture was sent  is positive .. sensitivities..  noted     will give 3 days of abx  > DC it as planned     Problem/Plan - 3:  ·  Problem: Ambulatory dysfunction.   ·  Plan: as above  PT.    Problem/Plan - 4:  ·  Problem: HTN (hypertension).   ·  Plan: hold HCTZ  Continue Current medications otherwise and monitor.  cardio to eval.  AAA monitoring as OP  repeat routine echo as OP to monitor PAH..     Problem/Plan - 5:  ·  Problem: Glaucoma.   ·  Plan: on multiple eye drops  Continue home medications.    -GI/DVT Prophylaxis per protocol.   lovenox   pepcid   nutritional supplement given severe protein calorie malnutrition    DC planing / home care     --------------------------------------------  Case discussed with pt..  NP  Education given on findings and plan of care  ___________________________  H. GATITO Thompson.  Pager: [phone]

## 2022-07-21 NOTE — DISCHARGE NOTE PROVIDER - CARE PROVIDERS DIRECT ADDRESSES
,DirectAddress_Unknown,DirectAddress_Unknown ,DirectAddress_Unknown ,[email or web address],DirectAddress_Unknown

## 2022-07-21 NOTE — DISCHARGE NOTE PROVIDER - HOSPITAL COURSE
Patient is an 89 yo woman with past medical history of HTN, HLD, Hypothyroidism. Glaucoma, osteoarthritis presents to ed after mechanical trip and fall getting out of bathroom.  pt lives by herself but her grand children live with her.. she woke u p this morning, went to bathroom , voided and was going back to bedroom when she may have tripped, twisted and fell on the floor and could not get up..  pt was unable to ambulates after falling due to pain in left leg  no fractures found  + small hematoma on Rt post scalp..  pt normally walks with bennett at home and walker outside the home     per report pt tested positive for covid on an in-home test recently   COVID-19 vaccine series completed w booster,  covid test positive here.. pt asymptomatic       Problem/Plan - 1:  ·  Problem: Fall.   ·  Plan: appears to be mechanical fall . imbalance  pt with  some dizziness with movement >> meclizine ATC ordered   no signs of fracture on imaging  PT  / OOB to chair daily as able emphasized   fall precautions  orthostatics positive  >> KARTIK stockings on   echo  encourage po intake and hydration  would benefit from rehab but family has decided against >> So Home PT    Problem/Plan - 2:  ·  Problem: 2019 novel coronavirus disease (COVID-19).   ·  Plan: pt with recent positive in home test   pt not symptomatic ( has chronic on and off cough)  no additional testing / management needed  monitor  supportive care  nutrition, hydration  vitamins   DC of isolation     a Urine culture was sent  is positive .. sensitivities..  Completed Cipro    Problem/Plan - 3:  ·  Problem: Ambulatory dysfunction.   ·  Plan: as above  PT.    Problem/Plan - 4:  ·  Problem: HTN (hypertension).   ·  Plan: hold HCTZ  Continue Current medications otherwise and monitor.  cardio to eval.    Problem/Plan - 5:  ·  Problem: Glaucoma.   ·  Plan: on multiple eye drops  Continue home medications. Patient is an 89 yo woman with past medical history of HTN, HLD, Hypothyroidism. Glaucoma, osteoarthritis presents to ed after mechanical trip and fall getting out of bathroom.  pt lives by herself but her grand children live with her.. she woke u p this morning, went to bathroom , voided and was going back to bedroom when she may have tripped, twisted and fell on the floor and could not get up. Pt was unable to ambulates after falling due to pain in left leg. No fractures found  + small hematoma on Rt post scalp..  pt normally walks with bennett at home and walker outside the home.      Problem/Plan - 1:  ·  Problem: Fall.   ·  Plan: appears to be mechanical fall . imbalance  pt with  some dizziness with movement >> meclizine ATC ordered   no signs of fracture on imaging  PT  / OOB to chair daily as able emphasized   fall precautions  orthostatics positive  >> KARTIK stockings on  >> improved orthostasis   encourage po intake and hydration       Problem/Plan - 2:  ·  Problem: 2019 novel coronavirus disease (COVID-19).   ·  Plan: pt with recent positive in home test   pt not symptomatic / doing well  pt off isolation   no additional testing / management needed  monitor / supportive care  nutrition, hydration  vitamins   >> given elevated D Dimer >> should be on Xarelto 10 mg daily for 30 days > ordered. Once Xarelto is completed can resume aspirin.     a Urine culture was sent  is positive .. sensitivities..  noted     post 3 days of abx  : asymptomatic      Problem/Plan - 3:  ·  Problem: Ambulatory dysfunction.   ·  Plan: as above  PT > JHONATAN      Problem/Plan - 4:  ·  Problem: HTN (hypertension).   ·  Plan: hold HCTZ  Continue Current medications otherwise and monitor.  cardio recs appreciated   AAA monitoring as OP in 6 months   repeat routine echo as OP to monitor PAH.      Problem/Plan - 5:  ·  Problem: Glaucoma.   ·  Plan: on multiple eye drops  Continue home medications.      Patient is medically cleared and stable for discharge. Discussed with .

## 2022-07-21 NOTE — DISCHARGE NOTE PROVIDER - NSDCMRMEDTOKEN_GEN_ALL_CORE_FT
acetaminophen 325 mg oral tablet: 2 tab(s) orally every 6 hours, As needed, Temp greater or equal to 38C (100.4F), Mild Pain (1 - 3)  amLODIPine 10 mg oral tablet: 1 tab(s) orally once a day  brimonidine 0.2% ophthalmic solution: 1 drop(s) in each eye 3 times a day  dorzolamide-timolol 2.23%-0.68% ophthalmic solution: 1 drop(s) in each eye 2 times a day  latanoprost 0.005% ophthalmic solution: 1 drop(s) in each eye once a day (in the evening)  levothyroxine 112 mcg (0.112 mg) oral tablet: 1 tab(s) orally once a day  lisinopril 40 mg oral tablet: 1 tab(s) orally once a day  meclizine 12.5 mg oral tablet: 1 tab(s) orally every 8 hours  simvastatin 10 mg oral tablet: 1 tab(s) orally once a day (at bedtime)   acetaminophen 325 mg oral tablet: 2 tab(s) orally every 6 hours, As needed, Temp greater or equal to 38C (100.4F), Mild Pain (1 - 3)  amLODIPine 10 mg oral tablet: 1 tab(s) orally once a day  brimonidine 0.2% ophthalmic solution: 1 drop(s) in each eye 3 times a day  dorzolamide-timolol 2.23%-0.68% ophthalmic solution: 1 drop(s) in each eye 2 times a day  latanoprost 0.005% ophthalmic solution: 1 drop(s) in each eye once a day (in the evening)  levothyroxine 112 mcg (0.112 mg) oral tablet: 1 tab(s) orally once a day  lisinopril 40 mg oral tablet: 1 tab(s) orally once a day  meclizine 12.5 mg oral tablet: 1 tab(s) orally every 8 hours  simvastatin 10 mg oral tablet: 1 tab(s) orally once a day (at bedtime)  Xarelto 10 mg oral tablet: 1 tab(s) orally once a day

## 2022-07-22 RX ORDER — RIVAROXABAN 15 MG-20MG
10 KIT ORAL DAILY
Refills: 0 | Status: DISCONTINUED | OUTPATIENT
Start: 2022-07-22 | End: 2022-07-27

## 2022-07-22 RX ORDER — RIVAROXABAN 15 MG-20MG
1 KIT ORAL
Qty: 30 | Refills: 0
Start: 2022-07-22 | End: 2022-08-20

## 2022-07-22 RX ADMIN — BRIMONIDINE TARTRATE 1 DROP(S): 2 SOLUTION/ DROPS OPHTHALMIC at 21:22

## 2022-07-22 RX ADMIN — Medication 1 TABLET(S): at 13:59

## 2022-07-22 RX ADMIN — SIMVASTATIN 10 MILLIGRAM(S): 20 TABLET, FILM COATED ORAL at 21:22

## 2022-07-22 RX ADMIN — BRIMONIDINE TARTRATE 1 DROP(S): 2 SOLUTION/ DROPS OPHTHALMIC at 14:03

## 2022-07-22 RX ADMIN — Medication 12.5 MILLIGRAM(S): at 14:00

## 2022-07-22 RX ADMIN — DORZOLAMIDE HYDROCHLORIDE TIMOLOL MALEATE 1 DROP(S): 20; 5 SOLUTION/ DROPS OPHTHALMIC at 17:31

## 2022-07-22 RX ADMIN — AMLODIPINE BESYLATE 10 MILLIGRAM(S): 2.5 TABLET ORAL at 04:35

## 2022-07-22 RX ADMIN — DORZOLAMIDE HYDROCHLORIDE TIMOLOL MALEATE 1 DROP(S): 20; 5 SOLUTION/ DROPS OPHTHALMIC at 04:36

## 2022-07-22 RX ADMIN — LISINOPRIL 40 MILLIGRAM(S): 2.5 TABLET ORAL at 04:34

## 2022-07-22 RX ADMIN — Medication 12.5 MILLIGRAM(S): at 04:35

## 2022-07-22 RX ADMIN — Medication 81 MILLIGRAM(S): at 13:59

## 2022-07-22 RX ADMIN — Medication 112 MICROGRAM(S): at 04:35

## 2022-07-22 RX ADMIN — RIVAROXABAN 10 MILLIGRAM(S): KIT at 14:00

## 2022-07-22 RX ADMIN — Medication 12.5 MILLIGRAM(S): at 23:02

## 2022-07-22 RX ADMIN — FAMOTIDINE 20 MILLIGRAM(S): 10 INJECTION INTRAVENOUS at 14:00

## 2022-07-22 RX ADMIN — LATANOPROST 1 DROP(S): 0.05 SOLUTION/ DROPS OPHTHALMIC; TOPICAL at 21:22

## 2022-07-22 RX ADMIN — BRIMONIDINE TARTRATE 1 DROP(S): 2 SOLUTION/ DROPS OPHTHALMIC at 04:36

## 2022-07-22 NOTE — PROGRESS NOTE ADULT - ASSESSMENT
_________________________________________________________________________________________  ========>>  M E D I C A L   A T T E N D I N G    F O L L O W  U P  N O T E  <<=========  -----------------------------------------------------------------------------------------------------    - Patient seen and examined by me earlier today.   - In summary,  ROMULO KOHLI is a 88y year old woman admitted post fall, pain, COVID   - Patient today overall doing ok, comfortable, eating OK. overall otherwise doing better               encouraged pt to increase PO intake and hydration , OOB to chair daily       pt's family now have agreed to rehab placement .. CM working on it..     ==================>> REVIEW OF SYSTEM <<=================    GEN: no fever, no chills, no pain reported   RESP: no SOB, no cough, no sputum  CVS: no chest pain, no palpitations, no edema  GI: no abdominal pain, no nausea, no constipation, no diarrhea  : no dysuria, no frequency  Neuro: no headache, dizziness with movement  as above   Derm : no itching, no rash    ==================>> PHYSICAL EXAM <<=================    GEN: A&O X 3 , NAD , comfortable, pleasant, calm in bed, cachectic    HEENT: NCAT, PERRL, MMM, hearing intact  Neck: supple , no JVD appreciated  CVS: S1S2 , regular , No M/R/G appreciated  PULM: CTA B/L,  no W/R/R appreciated  ABD.: soft. non tender, non distended,  bowel sounds present  Extrem: intact pulses , no edema   PSYCH : normal mood,  not anxious                             ( note written / Date of service   07-22-22 )    ==================>> MEDICATIONS <<====================    amLODIPine   Tablet 10 milliGRAM(s) Oral daily  aspirin enteric coated 81 milliGRAM(s) Oral daily  brimonidine 0.2% Ophthalmic Solution 1 Drop(s) Both EYES three times a day  dorzolamide 2%/timolol 0.5% Ophthalmic Solution 1 Drop(s) Both EYES two times a day  famotidine    Tablet 20 milliGRAM(s) Oral daily  latanoprost 0.005% Ophthalmic Solution 1 Drop(s) Both EYES at bedtime  levothyroxine 112 MICROGram(s) Oral daily  lisinopril 40 milliGRAM(s) Oral daily  meclizine 12.5 milliGRAM(s) Oral every 8 hours  multivitamin/minerals 1 Tablet(s) Oral daily  rivaroxaban 10 milliGRAM(s) Oral daily  simvastatin 10 milliGRAM(s) Oral at bedtime  sodium chloride 0.9%. 1000 milliLiter(s) IV Continuous <Continuous>    MEDICATIONS  (PRN):  acetaminophen     Tablet .. 650 milliGRAM(s) Oral every 6 hours PRN Temp greater or equal to 38C (100.4F), Mild Pain (1 - 3)  aluminum hydroxide/magnesium hydroxide/simethicone Suspension 30 milliLiter(s) Oral every 4 hours PRN Dyspepsia  melatonin 3 milliGRAM(s) Oral at bedtime PRN Insomnia  ondansetron Injectable 4 milliGRAM(s) IV Push every 8 hours PRN Nausea and/or Vomiting    ___________  Active diet:  Diet, Regular:   Supplement Feeding Modality:  Oral  Ensure Enlive Cans or Servings Per Day:  2       Frequency:  Daily  ___________________    ==================>> VITAL SIGNS <<==================     Vital Signs Last 24 HrsT(C): 36.4 (07-22-22 @ 13:54)  T(F): 97.6 (07-22-22 @ 13:54), Max: 98 (07-22-22 @ 05:12)  HR: 65 (07-22-22 @ 13:54) (65 - 67)  BP: 138/74 (07-22-22 @ 13:54)  RR: 18 (07-22-22 @ 13:54) (18 - 18)  SpO2: 95% (07-22-22 @ 13:54) (92% - 95%)       ==================>> LAB AND IMAGING <<==================       no labs today     < from: Limited Transthoracic Echo (07.20.22 @ 15:55) >  Conclusions:  Small, hyperdynamic left ventricle.  Moderate pulmonary hypertnesion.  < end of copied text >  ___________________________________________________________________________________  ===============>>  A S S E S S M E N T   A N D   P L A N <<===============  ------------------------------------------------------------------------------------------    · Assessment	  Patient is an 87 yo woman with past medical history of HTN, HLD, Hypothyroidism. Glaucoma, osteoarthritis presents to ed after mechanical trip and fall getting out of bathroom.  pt lives by herself but her grand children live with her.. she woke u p this morning, went to bathroom , voided and was going back to bedroom when she may have tripped, twisted and fell on the floor and could not get up..  pt was unable to ambulates after falling due to pain in left leg  no fractures found  + small hematoma on Rt post scalp..  pt normally walks with bennett at home and walker outside the home     per report pt tested positive for covid on an in-home test recently   COVID-19 vaccine series completed w booster,  covid test positive here.. pt asymptomatic   >> DC isolation as able     Problem/Plan - 1:  ·  Problem: Fall.   ·  Plan: appears to be mechanical fall . imbalance  pt with  some dizziness with movement >> meclizine ATC ordered   no signs of fracture on imaging  PT  / OOB to chair daily as able emphasized   fall precautions  orthostatics positive  >> KARTIK stockings on   echo  encourage po intake and hydration  would benefit from rehab : family now agreeable >> CM working on it >> needs Auth     Problem/Plan - 2:  ·  Problem: 2019 novel coronavirus disease (COVID-19).   ·  Plan: pt with recent positive in home test   pt not symptomatic ( has chronic on and off cough)  no additional testing / management needed  monitor  supportive care  nutrition, hydration  vitamins   DC of isolation     a Urine culture was sent  is positive .. sensitivities..  noted     post 3 days of abx  : monitor     Problem/Plan - 3:  ·  Problem: Ambulatory dysfunction.   ·  Plan: as above  PT.    Problem/Plan - 4:  ·  Problem: HTN (hypertension).   ·  Plan: hold HCTZ  Continue Current medications otherwise and monitor.  cardio to eval.  AAA monitoring as OP  repeat routine echo as OP to monitor PAH..     Problem/Plan - 5:  ·  Problem: Glaucoma.   ·  Plan: on multiple eye drops  Continue home medications.    -GI/DVT Prophylaxis per protocol.   lovenox   pepcid   nutritional supplement given severe protein calorie malnutrition    DC planing a above     --------------------------------------------  Case discussed with pt..  NP  Education given on findings and plan of care  ___________________________  H. GATITO Thompson.  Pager: [phone]        _________________________________________________________________________________________  ========>>  M E D I C A L   A T T E N D I N G    F O L L O W  U P  N O T E  <<=========  -----------------------------------------------------------------------------------------------------    - Patient seen and examined by me earlier today.   - In summary,  ROMULO KOHLI is a 88y year old woman admitted post fall, pain, COVID   - Patient today overall doing ok, comfortable, eating OK. overall otherwise doing better               encouraged pt to increase PO intake and hydration , OOB to chair daily       pt's family now have agreed to rehab placement .. CM working on it..     ==================>> REVIEW OF SYSTEM <<=================    GEN: no fever, no chills, no pain reported   RESP: no SOB, no cough, no sputum  CVS: no chest pain, no palpitations, no edema  GI: no abdominal pain, no nausea, no constipation, no diarrhea  : no dysuria, no frequency  Neuro: no headache, dizziness with movement  as above   Derm : no itching, no rash    ==================>> PHYSICAL EXAM <<=================    GEN: A&O X 3 , NAD , comfortable, pleasant, calm in bed, cachectic    HEENT: NCAT, PERRL, MMM, hearing intact  Neck: supple , no JVD appreciated  CVS: S1S2 , regular , No M/R/G appreciated  PULM: CTA B/L,  no W/R/R appreciated  ABD.: soft. non tender, non distended,  bowel sounds present  Extrem: intact pulses , no edema   PSYCH : normal mood,  not anxious                             ( note written / Date of service   07-22-22 )    ==================>> MEDICATIONS <<====================    amLODIPine   Tablet 10 milliGRAM(s) Oral daily  aspirin enteric coated 81 milliGRAM(s) Oral daily  brimonidine 0.2% Ophthalmic Solution 1 Drop(s) Both EYES three times a day  dorzolamide 2%/timolol 0.5% Ophthalmic Solution 1 Drop(s) Both EYES two times a day  famotidine    Tablet 20 milliGRAM(s) Oral daily  latanoprost 0.005% Ophthalmic Solution 1 Drop(s) Both EYES at bedtime  levothyroxine 112 MICROGram(s) Oral daily  lisinopril 40 milliGRAM(s) Oral daily  meclizine 12.5 milliGRAM(s) Oral every 8 hours  multivitamin/minerals 1 Tablet(s) Oral daily  rivaroxaban 10 milliGRAM(s) Oral daily  simvastatin 10 milliGRAM(s) Oral at bedtime  sodium chloride 0.9%. 1000 milliLiter(s) IV Continuous <Continuous>    MEDICATIONS  (PRN):  acetaminophen     Tablet .. 650 milliGRAM(s) Oral every 6 hours PRN Temp greater or equal to 38C (100.4F), Mild Pain (1 - 3)  aluminum hydroxide/magnesium hydroxide/simethicone Suspension 30 milliLiter(s) Oral every 4 hours PRN Dyspepsia  melatonin 3 milliGRAM(s) Oral at bedtime PRN Insomnia  ondansetron Injectable 4 milliGRAM(s) IV Push every 8 hours PRN Nausea and/or Vomiting    ___________  Active diet:  Diet, Regular:   Supplement Feeding Modality:  Oral  Ensure Enlive Cans or Servings Per Day:  2       Frequency:  Daily  ___________________    ==================>> VITAL SIGNS <<==================     Vital Signs Last 24 HrsT(C): 36.4 (07-22-22 @ 13:54)  T(F): 97.6 (07-22-22 @ 13:54), Max: 98 (07-22-22 @ 05:12)  HR: 65 (07-22-22 @ 13:54) (65 - 67)  BP: 138/74 (07-22-22 @ 13:54)  RR: 18 (07-22-22 @ 13:54) (18 - 18)  SpO2: 95% (07-22-22 @ 13:54) (92% - 95%)       ==================>> LAB AND IMAGING <<==================       no labs today     < from: Limited Transthoracic Echo (07.20.22 @ 15:55) >  Conclusions:  Small, hyperdynamic left ventricle.  Moderate pulmonary hypertnesion.  < end of copied text >  ___________________________________________________________________________________  ===============>>  A S S E S S M E N T   A N D   P L A N <<===============  ------------------------------------------------------------------------------------------    · Assessment	  Patient is an 89 yo woman with past medical history of HTN, HLD, Hypothyroidism. Glaucoma, osteoarthritis presents to ed after mechanical trip and fall getting out of bathroom.  pt lives by herself but her grand children live with her.. she woke u p this morning, went to bathroom , voided and was going back to bedroom when she may have tripped, twisted and fell on the floor and could not get up..  pt was unable to ambulates after falling due to pain in left leg  no fractures found  + small hematoma on Rt post scalp..  pt normally walks with bennett at home and walker outside the home     per report pt tested positive for covid on an in-home test recently   COVID-19 vaccine series completed w booster,  covid test positive here.. pt asymptomatic   >> DC isolation as able     Problem/Plan - 1:  ·  Problem: Fall.   ·  Plan: appears to be mechanical fall . imbalance  pt with  some dizziness with movement >> meclizine ATC ordered   no signs of fracture on imaging  PT  / OOB to chair daily as able emphasized   fall precautions  orthostatics positive  >> KARTIK stockings on   echo  encourage po intake and hydration  would benefit from rehab : family now agreeable >> CM working on it >> needs Auth     Problem/Plan - 2:  ·  Problem: 2019 novel coronavirus disease (COVID-19).   ·  Plan: pt with recent positive in home test   pt not symptomatic ( has chronic on and off cough)  no additional testing / management needed  monitor  supportive care  nutrition, hydration  vitamins   DC of isolation   >> given elevated D Dimer >> should be on Xarelto 10 mg daily for 30 days > ordered     a Urine culture was sent  is positive .. sensitivities..  noted     post 3 days of abx  : monitor     Problem/Plan - 3:  ·  Problem: Ambulatory dysfunction.   ·  Plan: as above  PT.    Problem/Plan - 4:  ·  Problem: HTN (hypertension).   ·  Plan: hold HCTZ  Continue Current medications otherwise and monitor.  cardio to eval.  AAA monitoring as OP  repeat routine echo as OP to monitor PAH..     Problem/Plan - 5:  ·  Problem: Glaucoma.   ·  Plan: on multiple eye drops  Continue home medications.    -GI/DVT Prophylaxis per protocol.   lovenox   pepcid   nutritional supplement given severe protein calorie malnutrition    DC planing a above     --------------------------------------------  Case discussed with pt..  NP  Education given on findings and plan of care  ___________________________  H. GATITO Thompson.  Pager: [phone]

## 2022-07-23 LAB
ALBUMIN SERPL ELPH-MCNC: 3 G/DL — LOW (ref 3.3–5)
ALP SERPL-CCNC: 53 U/L — SIGNIFICANT CHANGE UP (ref 40–120)
ALT FLD-CCNC: 19 U/L — SIGNIFICANT CHANGE UP (ref 10–45)
ANION GAP SERPL CALC-SCNC: 13 MMOL/L — SIGNIFICANT CHANGE UP (ref 5–17)
AST SERPL-CCNC: 17 U/L — SIGNIFICANT CHANGE UP (ref 10–40)
BILIRUB SERPL-MCNC: 0.2 MG/DL — SIGNIFICANT CHANGE UP (ref 0.2–1.2)
BUN SERPL-MCNC: 32 MG/DL — HIGH (ref 7–23)
CALCIUM SERPL-MCNC: 10.2 MG/DL — SIGNIFICANT CHANGE UP (ref 8.4–10.5)
CHLORIDE SERPL-SCNC: 107 MMOL/L — SIGNIFICANT CHANGE UP (ref 96–108)
CO2 SERPL-SCNC: 24 MMOL/L — SIGNIFICANT CHANGE UP (ref 22–31)
CREAT SERPL-MCNC: 0.87 MG/DL — SIGNIFICANT CHANGE UP (ref 0.5–1.3)
EGFR: 64 ML/MIN/1.73M2 — SIGNIFICANT CHANGE UP
GLUCOSE SERPL-MCNC: 95 MG/DL — SIGNIFICANT CHANGE UP (ref 70–99)
HCT VFR BLD CALC: 41.8 % — SIGNIFICANT CHANGE UP (ref 34.5–45)
HGB BLD-MCNC: 13.3 G/DL — SIGNIFICANT CHANGE UP (ref 11.5–15.5)
MCHC RBC-ENTMCNC: 27.9 PG — SIGNIFICANT CHANGE UP (ref 27–34)
MCHC RBC-ENTMCNC: 31.8 GM/DL — LOW (ref 32–36)
MCV RBC AUTO: 87.6 FL — SIGNIFICANT CHANGE UP (ref 80–100)
NRBC # BLD: 0 /100 WBCS — SIGNIFICANT CHANGE UP (ref 0–0)
PLATELET # BLD AUTO: 266 K/UL — SIGNIFICANT CHANGE UP (ref 150–400)
POTASSIUM SERPL-MCNC: 4.1 MMOL/L — SIGNIFICANT CHANGE UP (ref 3.5–5.3)
POTASSIUM SERPL-SCNC: 4.1 MMOL/L — SIGNIFICANT CHANGE UP (ref 3.5–5.3)
PROT SERPL-MCNC: 7.1 G/DL — SIGNIFICANT CHANGE UP (ref 6–8.3)
RBC # BLD: 4.77 M/UL — SIGNIFICANT CHANGE UP (ref 3.8–5.2)
RBC # FLD: 14.2 % — SIGNIFICANT CHANGE UP (ref 10.3–14.5)
SODIUM SERPL-SCNC: 144 MMOL/L — SIGNIFICANT CHANGE UP (ref 135–145)
WBC # BLD: 5.24 K/UL — SIGNIFICANT CHANGE UP (ref 3.8–10.5)
WBC # FLD AUTO: 5.24 K/UL — SIGNIFICANT CHANGE UP (ref 3.8–10.5)

## 2022-07-23 RX ADMIN — SIMVASTATIN 10 MILLIGRAM(S): 20 TABLET, FILM COATED ORAL at 21:41

## 2022-07-23 RX ADMIN — Medication 112 MICROGRAM(S): at 06:26

## 2022-07-23 RX ADMIN — RIVAROXABAN 10 MILLIGRAM(S): KIT at 13:51

## 2022-07-23 RX ADMIN — DORZOLAMIDE HYDROCHLORIDE TIMOLOL MALEATE 1 DROP(S): 20; 5 SOLUTION/ DROPS OPHTHALMIC at 06:26

## 2022-07-23 RX ADMIN — Medication 12.5 MILLIGRAM(S): at 21:41

## 2022-07-23 RX ADMIN — AMLODIPINE BESYLATE 10 MILLIGRAM(S): 2.5 TABLET ORAL at 06:26

## 2022-07-23 RX ADMIN — LISINOPRIL 40 MILLIGRAM(S): 2.5 TABLET ORAL at 06:26

## 2022-07-23 RX ADMIN — BRIMONIDINE TARTRATE 1 DROP(S): 2 SOLUTION/ DROPS OPHTHALMIC at 06:25

## 2022-07-23 RX ADMIN — DORZOLAMIDE HYDROCHLORIDE TIMOLOL MALEATE 1 DROP(S): 20; 5 SOLUTION/ DROPS OPHTHALMIC at 18:00

## 2022-07-23 RX ADMIN — LATANOPROST 1 DROP(S): 0.05 SOLUTION/ DROPS OPHTHALMIC; TOPICAL at 21:41

## 2022-07-23 RX ADMIN — BRIMONIDINE TARTRATE 1 DROP(S): 2 SOLUTION/ DROPS OPHTHALMIC at 21:41

## 2022-07-23 RX ADMIN — Medication 81 MILLIGRAM(S): at 13:50

## 2022-07-23 RX ADMIN — BRIMONIDINE TARTRATE 1 DROP(S): 2 SOLUTION/ DROPS OPHTHALMIC at 13:51

## 2022-07-23 RX ADMIN — Medication 12.5 MILLIGRAM(S): at 13:50

## 2022-07-23 RX ADMIN — Medication 1 TABLET(S): at 13:51

## 2022-07-23 RX ADMIN — Medication 12.5 MILLIGRAM(S): at 06:26

## 2022-07-23 NOTE — PROGRESS NOTE ADULT - ASSESSMENT
---------------  Patient is an 87 yo woman      HTN, HLD, Hypothyroidism. Glaucoma, osteoarthritis     presents to ed after mechanical trip and fall getting out of bathroom.  pt states she lives by herself but her grand children live with her.. she woke u p this morning, went to bathroom , voided and was going back to bedroom when she may have tripped, twisted and fell on the floor and could not get up..  per report pt tested positive for covid on an in-home test recently    COVID-19 vaccine series completed w booster,  covid test positive here.. pt asymptomatic (except for chronic  ?smokers cough  pt with sig hx of htn hx sound like vasovagal syncope   sinus bradycardia  dvt prophylaxis  +sig orthostatic hypotension. iv fluid, repeat   PT  eval   elias stockings bl/ physical therapy for deconditioning  echo noted with mod pulmonary HTN,   e check dopplers venous  bp is over all controlled  AAA/ ASHD on ct, asa daily, fu AAA in 6 months  not orthostatic, if recurrent will decrease norvasc. pe r card

## 2022-07-23 NOTE — PROGRESS NOTE ADULT - NUTRITIONAL ASSESSMENT
This patient has been assessed with a concern for Malnutrition and has been determined to have a diagnosis/diagnoses of Moderate protein-calorie malnutrition.    This patient is being managed with:   Diet Regular-     Qty per Day:  1  Entered: Jul 22 2022  2:47PM

## 2022-07-24 RX ADMIN — BRIMONIDINE TARTRATE 1 DROP(S): 2 SOLUTION/ DROPS OPHTHALMIC at 13:07

## 2022-07-24 RX ADMIN — BRIMONIDINE TARTRATE 1 DROP(S): 2 SOLUTION/ DROPS OPHTHALMIC at 05:19

## 2022-07-24 RX ADMIN — DORZOLAMIDE HYDROCHLORIDE TIMOLOL MALEATE 1 DROP(S): 20; 5 SOLUTION/ DROPS OPHTHALMIC at 05:19

## 2022-07-24 RX ADMIN — Medication 81 MILLIGRAM(S): at 11:59

## 2022-07-24 RX ADMIN — Medication 12.5 MILLIGRAM(S): at 22:19

## 2022-07-24 RX ADMIN — Medication 1 TABLET(S): at 11:58

## 2022-07-24 RX ADMIN — RIVAROXABAN 10 MILLIGRAM(S): KIT at 11:59

## 2022-07-24 RX ADMIN — DORZOLAMIDE HYDROCHLORIDE TIMOLOL MALEATE 1 DROP(S): 20; 5 SOLUTION/ DROPS OPHTHALMIC at 17:11

## 2022-07-24 RX ADMIN — LISINOPRIL 40 MILLIGRAM(S): 2.5 TABLET ORAL at 05:19

## 2022-07-24 RX ADMIN — Medication 112 MICROGRAM(S): at 05:19

## 2022-07-24 RX ADMIN — Medication 12.5 MILLIGRAM(S): at 05:19

## 2022-07-24 RX ADMIN — AMLODIPINE BESYLATE 10 MILLIGRAM(S): 2.5 TABLET ORAL at 05:19

## 2022-07-24 RX ADMIN — BRIMONIDINE TARTRATE 1 DROP(S): 2 SOLUTION/ DROPS OPHTHALMIC at 22:19

## 2022-07-24 RX ADMIN — LATANOPROST 1 DROP(S): 0.05 SOLUTION/ DROPS OPHTHALMIC; TOPICAL at 22:19

## 2022-07-24 RX ADMIN — SIMVASTATIN 10 MILLIGRAM(S): 20 TABLET, FILM COATED ORAL at 22:18

## 2022-07-24 RX ADMIN — Medication 12.5 MILLIGRAM(S): at 13:07

## 2022-07-24 NOTE — PROGRESS NOTE ADULT - ASSESSMENT
_________________________________________________________________________________________  ========>>  M E D I C A L   A T T E N D I N G    F O L L O W  U P  N O T E  <<=========  -----------------------------------------------------------------------------------------------------    - Patient seen and examined by me earlier today.   - In summary,  ROMULO KOHLI is a 88y year old woman admitted post fall, pain, COVID   - Patient today overall doing ok, comfortable, eating OK. overall otherwise doing better      pt states eating well, was OOB to chair and no more dizziness reported       pt's family now have agreed to rehab placement .. CM working on it.. pending     ==================>> REVIEW OF SYSTEM <<=================    GEN: no fever, no chills, no pain reported   RESP: no SOB, no cough, no sputum  CVS: no chest pain, no palpitations  GI: no abdominal pain, no nausea  : no dysuria, no frequency  Neuro: no headache, dizziness much improved / resolved..   Derm : no itching, no rash    ==================>> PHYSICAL EXAM <<=================    GEN: A&O X 3 , NAD , comfortable, pleasant, calm in bed  HEENT: NCAT, PERRL, MMM, hearing intact  Neck: supple , no JVD appreciated  CVS: S1S2 , regular , No M/R/G appreciated  PULM: CTA B/L,  no W/R/R appreciated  ABD.: soft. non tender, non distended  Extrem: intact pulses , no edema   PSYCH : normal mood,  not anxious                             ( Note written / Date of service 07-24-22 )    ==================>> MEDICATIONS <<====================    amLODIPine   Tablet 10 milliGRAM(s) Oral daily  aspirin enteric coated 81 milliGRAM(s) Oral daily  brimonidine 0.2% Ophthalmic Solution 1 Drop(s) Both EYES three times a day  dorzolamide 2%/timolol 0.5% Ophthalmic Solution 1 Drop(s) Both EYES two times a day  latanoprost 0.005% Ophthalmic Solution 1 Drop(s) Both EYES at bedtime  levothyroxine 112 MICROGram(s) Oral daily  lisinopril 40 milliGRAM(s) Oral daily  meclizine 12.5 milliGRAM(s) Oral every 8 hours  multivitamin/minerals 1 Tablet(s) Oral daily  rivaroxaban 10 milliGRAM(s) Oral daily  simvastatin 10 milliGRAM(s) Oral at bedtime  sodium chloride 0.9%. 1000 milliLiter(s) IV Continuous <Continuous>    MEDICATIONS  (PRN):  acetaminophen     Tablet .. 650 milliGRAM(s) Oral every 6 hours PRN Temp greater or equal to 38C (100.4F), Mild Pain (1 - 3)  melatonin 3 milliGRAM(s) Oral at bedtime PRN Insomnia  ondansetron Injectable 4 milliGRAM(s) IV Push every 8 hours PRN Nausea and/or Vomiting    ___________  Active diet:  Diet, Regular:      Qty per Day:  1  ___________________    ==================>> VITAL SIGNS <<==================    Vital Signs Last 24 HrsT(C): 37.1 (07-24-22 @ 16:15)  T(F): 98.8 (07-24-22 @ 16:15), Max: 98.8 (07-24-22 @ 16:15)  HR: 63 (07-24-22 @ 16:15) (62 - 69)  BP: 139/78 (07-24-22 @ 16:15)  RR: 16 (07-24-22 @ 16:15) (16 - 18)  SpO2: 96% (07-24-22 @ 16:15) (95% - 97%)         ==================>> LAB AND IMAGING <<==================                        13.3   5.24  )-----------( 266      ( 23 Jul 2022 07:42 )             41.8        07-23    144  |  107  |  32<H>  ----------------------------<  95  4.1   |  24  |  0.87    Ca    10.2      23 Jul 2022 07:24    TPro  7.1  /  Alb  3.0<L>  /  TBili  0.2  /  DBili  x   /  AST  17  /  ALT  19  /  AlkPhos  53  07-23    < from: Limited Transthoracic Echo (07.20.22 @ 15:55) >  Conclusions:  Small, hyperdynamic left ventricle.  Moderate pulmonary hypertnesion.  < end of copied text >  ___________________________________________________________________________________  ===============>>  A S S E S S M E N T   A N D   P L A N <<===============  ------------------------------------------------------------------------------------------    · Assessment	  Patient is an 89 yo woman with past medical history of HTN, HLD, Hypothyroidism. Glaucoma, osteoarthritis presents to ed after mechanical trip and fall getting out of bathroom.  pt lives by herself but her grand children live with her.. she woke u p this morning, went to bathroom , voided and was going back to bedroom when she may have tripped, twisted and fell on the floor and could not get up..  pt was unable to ambulates after falling due to pain in left leg  no fractures found  + small hematoma on Rt post scalp..  pt normally walks with bennett at home and walker outside the home     Problem/Plan - 1:  ·  Problem: Fall.   ·  Plan: appears to be mechanical fall . imbalance  pt with  some dizziness with movement >> meclizine ATC ordered   no signs of fracture on imaging  PT  / OOB to chair daily as able emphasized   fall precautions  orthostatics positive  >> KARTIK stockings on   echo  encourage po intake and hydration  rehab pending >> CM working on it >> needs Auth     Problem/Plan - 2:  ·  Problem: 2019 novel coronavirus disease (COVID-19).   ·  Plan: pt with recent positive in home test   pt not symptomatic / doing well  pt off isolation   no additional testing / management needed  monitor / supportive care  nutrition, hydration  vitamins   >> given elevated D Dimer >> should be on Xarelto 10 mg daily for 30 days > ordered     a Urine culture was sent  is positive .. sensitivities..  noted     post 3 days of abx  : monitor     Problem/Plan - 3:  ·  Problem: Ambulatory dysfunction.   ·  Plan: as above  PT.    Problem/Plan - 4:  ·  Problem: HTN (hypertension).   ·  Plan: hold HCTZ  Continue Current medications otherwise and monitor.  cardio to eval.  AAA monitoring as OP  repeat routine echo as OP to monitor PAH..     Problem/Plan - 5:  ·  Problem: Glaucoma.   ·  Plan: on multiple eye drops  Continue home medications.    -GI/DVT Prophylaxis per protocol.   lovenox   pepcid   nutritional supplement given severe protein calorie malnutrition    DC planing a above     --------------------------------------------  Case discussed with pt..    Education given on findings and plan of care  ___________________________  H. GATITO Thompson.  Pager: [phone]

## 2022-07-25 RX ADMIN — Medication 1 TABLET(S): at 12:49

## 2022-07-25 RX ADMIN — SIMVASTATIN 10 MILLIGRAM(S): 20 TABLET, FILM COATED ORAL at 23:07

## 2022-07-25 RX ADMIN — Medication 12.5 MILLIGRAM(S): at 12:48

## 2022-07-25 RX ADMIN — DORZOLAMIDE HYDROCHLORIDE TIMOLOL MALEATE 1 DROP(S): 20; 5 SOLUTION/ DROPS OPHTHALMIC at 17:10

## 2022-07-25 RX ADMIN — LATANOPROST 1 DROP(S): 0.05 SOLUTION/ DROPS OPHTHALMIC; TOPICAL at 23:08

## 2022-07-25 RX ADMIN — RIVAROXABAN 10 MILLIGRAM(S): KIT at 12:48

## 2022-07-25 RX ADMIN — Medication 12.5 MILLIGRAM(S): at 23:07

## 2022-07-25 RX ADMIN — BRIMONIDINE TARTRATE 1 DROP(S): 2 SOLUTION/ DROPS OPHTHALMIC at 23:08

## 2022-07-25 RX ADMIN — Medication 112 MICROGRAM(S): at 05:29

## 2022-07-25 RX ADMIN — BRIMONIDINE TARTRATE 1 DROP(S): 2 SOLUTION/ DROPS OPHTHALMIC at 05:30

## 2022-07-25 RX ADMIN — BRIMONIDINE TARTRATE 1 DROP(S): 2 SOLUTION/ DROPS OPHTHALMIC at 12:49

## 2022-07-25 RX ADMIN — DORZOLAMIDE HYDROCHLORIDE TIMOLOL MALEATE 1 DROP(S): 20; 5 SOLUTION/ DROPS OPHTHALMIC at 05:30

## 2022-07-25 RX ADMIN — LISINOPRIL 40 MILLIGRAM(S): 2.5 TABLET ORAL at 05:29

## 2022-07-25 RX ADMIN — Medication 12.5 MILLIGRAM(S): at 05:29

## 2022-07-25 RX ADMIN — AMLODIPINE BESYLATE 10 MILLIGRAM(S): 2.5 TABLET ORAL at 05:29

## 2022-07-25 RX ADMIN — Medication 81 MILLIGRAM(S): at 12:48

## 2022-07-25 NOTE — PROGRESS NOTE ADULT - ASSESSMENT
_________________________________________________________________________________________  ========>>  M E D I C A L   A T T E N D I N G    F O L L O W  U P  N O T E  <<=========  -----------------------------------------------------------------------------------------------------    - Patient seen and examined by me earlier today.   - In summary,  ROMULO KOHLI is a 88y year old woman admitted post fall, pain, COVID   - Patient today overall doing ok, comfortable, eating OK. overall otherwise doing better      pt states eating well, was OOB to chair and no more dizziness reported       pt's family now have agreed to rehab placement .. CM working on it.. pending authorization     ==================>> REVIEW OF SYSTEM <<=================    GEN: no fever, no chills, no pain reported   RESP: no SOB, no cough, no sputum  CVS: no chest pain, no palpitations  GI: no abdominal pain, no nausea  : no dysuria, no frequency  Neuro: no headache, dizziness much improved / resolved..   Derm : no itching, no rash    ==================>> PHYSICAL EXAM <<=================    GEN: A&O X 3 , NAD , comfortable, pleasant, calm in bed  HEENT: NCAT, PERRL, MMM, hearing intact  Neck: supple , no JVD appreciated  CVS: S1S2 , regular , No M/R/G appreciated  PULM: CTA B/L,  no W/R/R appreciated  ABD.: soft. non tender, non distended  Extrem: intact pulses , no edema   PSYCH : normal mood,  not anxious                             ( Note written / Date of service 07-25-22 )    ==================>> MEDICATIONS <<====================    amLODIPine   Tablet 10 milliGRAM(s) Oral daily  aspirin enteric coated 81 milliGRAM(s) Oral daily  brimonidine 0.2% Ophthalmic Solution 1 Drop(s) Both EYES three times a day  dorzolamide 2%/timolol 0.5% Ophthalmic Solution 1 Drop(s) Both EYES two times a day  latanoprost 0.005% Ophthalmic Solution 1 Drop(s) Both EYES at bedtime  levothyroxine 112 MICROGram(s) Oral daily  lisinopril 40 milliGRAM(s) Oral daily  meclizine 12.5 milliGRAM(s) Oral every 8 hours  multivitamin/minerals 1 Tablet(s) Oral daily  rivaroxaban 10 milliGRAM(s) Oral daily  simvastatin 10 milliGRAM(s) Oral at bedtime  sodium chloride 0.9%. 1000 milliLiter(s) IV Continuous <Continuous>    MEDICATIONS  (PRN):  acetaminophen     Tablet .. 650 milliGRAM(s) Oral every 6 hours PRN Temp greater or equal to 38C (100.4F), Mild Pain (1 - 3)  melatonin 3 milliGRAM(s) Oral at bedtime PRN Insomnia  ondansetron Injectable 4 milliGRAM(s) IV Push every 8 hours PRN Nausea and/or Vomiting    ___________  Active diet:  Diet, Regular:      Qty per Day:  1  ___________________    ==================>> VITAL SIGNS <<==================    Vital Signs Last 24 HrsT(C): 36.7 (07-25-22 @ 12:24)  T(F): 98 (07-25-22 @ 12:24), Max: 98.5 (07-24-22 @ 22:00)  HR: 69 (07-25-22 @ 15:26) (69 - 98)  BP: 144/78 (07-25-22 @ 15:26)  RR: 18 (07-25-22 @ 12:24) (16 - 18)  SpO2: 95% (07-25-22 @ 15:26) (95% - 96%)       ==================>> LAB AND IMAGING <<==================       no labs today     < from: Limited Transthoracic Echo (07.20.22 @ 15:55) >  Conclusions:  Small, hyperdynamic left ventricle.  Moderate pulmonary hypertnesion.  < end of copied text >  ___________________________________________________________________________________  ===============>>  A S S E S S M E N T   A N D   P L A N <<===============  ------------------------------------------------------------------------------------------    · Assessment	  Patient is an 89 yo woman with past medical history of HTN, HLD, Hypothyroidism. Glaucoma, osteoarthritis presents to ed after mechanical trip and fall getting out of bathroom.  pt lives by herself but her grand children live with her.. she woke u p this morning, went to bathroom , voided and was going back to bedroom when she may have tripped, twisted and fell on the floor and could not get up..  pt was unable to ambulates after falling due to pain in left leg  no fractures found  + small hematoma on Rt post scalp..  pt normally walks with bennett at home and walker outside the home     Problem/Plan - 1:  ·  Problem: Fall.   ·  Plan: appears to be mechanical fall . imbalance  pt with  some dizziness with movement >> meclizine ATC ordered   no signs of fracture on imaging  PT  / OOB to chair daily as able emphasized   fall precautions  orthostatics positive  >> KARTIK stockings on  >> improved orthostasis   encourage po intake and hydration  rehab pending >> CM working on it >> pending Auth     Problem/Plan - 2:  ·  Problem: 2019 novel coronavirus disease (COVID-19).   ·  Plan: pt with recent positive in home test   pt not symptomatic / doing well  pt off isolation   no additional testing / management needed  monitor / supportive care  nutrition, hydration  vitamins   >> given elevated D Dimer >> should be on Xarelto 10 mg daily for 30 days > ordered     a Urine culture was sent  is positive .. sensitivities..  noted     post 3 days of abx  : monitor     Problem/Plan - 3:  ·  Problem: Ambulatory dysfunction.   ·  Plan: as above  PT.    Problem/Plan - 4:  ·  Problem: HTN (hypertension).   ·  Plan: hold HCTZ  Continue Current medications otherwise and monitor.  cardio to eval.  AAA monitoring as OP  repeat routine echo as OP to monitor PAH..     Problem/Plan - 5:  ·  Problem: Glaucoma.   ·  Plan: on multiple eye drops  Continue home medications.    -GI/DVT Prophylaxis per protocol.   lovenox   pepcid   nutritional supplement given severe protein calorie malnutrition    DC planing a above     --------------------------------------------  Case discussed with pt, RN..   Education given on findings and plan of care  ___________________________  HHailee Thompson D.O.  Pager: [phone]

## 2022-07-26 LAB — SARS-COV-2 RNA SPEC QL NAA+PROBE: DETECTED

## 2022-07-26 RX ADMIN — AMLODIPINE BESYLATE 10 MILLIGRAM(S): 2.5 TABLET ORAL at 05:43

## 2022-07-26 RX ADMIN — DORZOLAMIDE HYDROCHLORIDE TIMOLOL MALEATE 1 DROP(S): 20; 5 SOLUTION/ DROPS OPHTHALMIC at 16:40

## 2022-07-26 RX ADMIN — DORZOLAMIDE HYDROCHLORIDE TIMOLOL MALEATE 1 DROP(S): 20; 5 SOLUTION/ DROPS OPHTHALMIC at 05:45

## 2022-07-26 RX ADMIN — Medication 12.5 MILLIGRAM(S): at 05:44

## 2022-07-26 RX ADMIN — BRIMONIDINE TARTRATE 1 DROP(S): 2 SOLUTION/ DROPS OPHTHALMIC at 22:46

## 2022-07-26 RX ADMIN — Medication 1 TABLET(S): at 13:21

## 2022-07-26 RX ADMIN — RIVAROXABAN 10 MILLIGRAM(S): KIT at 13:21

## 2022-07-26 RX ADMIN — BRIMONIDINE TARTRATE 1 DROP(S): 2 SOLUTION/ DROPS OPHTHALMIC at 05:45

## 2022-07-26 RX ADMIN — Medication 12.5 MILLIGRAM(S): at 13:21

## 2022-07-26 RX ADMIN — SIMVASTATIN 10 MILLIGRAM(S): 20 TABLET, FILM COATED ORAL at 22:45

## 2022-07-26 RX ADMIN — Medication 81 MILLIGRAM(S): at 13:21

## 2022-07-26 RX ADMIN — BRIMONIDINE TARTRATE 1 DROP(S): 2 SOLUTION/ DROPS OPHTHALMIC at 13:21

## 2022-07-26 RX ADMIN — Medication 12.5 MILLIGRAM(S): at 22:45

## 2022-07-26 RX ADMIN — LISINOPRIL 40 MILLIGRAM(S): 2.5 TABLET ORAL at 05:44

## 2022-07-26 RX ADMIN — LATANOPROST 1 DROP(S): 0.05 SOLUTION/ DROPS OPHTHALMIC; TOPICAL at 22:46

## 2022-07-26 RX ADMIN — Medication 112 MICROGRAM(S): at 05:43

## 2022-07-26 NOTE — PROGRESS NOTE ADULT - ASSESSMENT
_________________________________________________________________________________________  ========>>  M E D I C A L   A T T E N D I N G    F O L L O W  U P  N O T E  <<=========  -----------------------------------------------------------------------------------------------------    - Patient seen and examined by me earlier today.   - In summary,  ROMULO KOHLI is a 88y year old woman admitted post fall, pain, COVID   - Patient today overall doing ok, comfortable, eating OK. overall otherwise doing better      pt states eating well, OOB to chair daily, and no more dizziness reported , had 3 BMs yesterday per RN        pending authorization still!!     ==================>> REVIEW OF SYSTEM <<=================    GEN: no fever, no chills, no pain reported   RESP: no SOB, no cough, no sputum  CVS: no chest pain, no palpitations  GI: no abdominal pain, no nausea  : no dysuria, no frequency  Neuro: no headache, dizziness much improved / resolved..   Derm : no itching, no rash    ==================>> PHYSICAL EXAM <<=================    GEN: A&O X 3 , NAD , comfortable, pleasant, calm in bed>> encouraged OOb and increased hydration   HEENT: NCAT, PERRL, MMM, hearing intact  Neck: supple , no JVD appreciated  CVS: S1S2 , regular , No M/R/G appreciated  PULM: CTA B/L,  no W/R/R appreciated  ABD.: soft. non tender, non distended  Extrem: intact pulses , no edema   PSYCH : normal mood,  not anxious                             ( note written / Date of service   07-26-22 )    ==================>> MEDICATIONS <<====================    amLODIPine   Tablet 10 milliGRAM(s) Oral daily  aspirin enteric coated 81 milliGRAM(s) Oral daily  brimonidine 0.2% Ophthalmic Solution 1 Drop(s) Both EYES three times a day  dorzolamide 2%/timolol 0.5% Ophthalmic Solution 1 Drop(s) Both EYES two times a day  latanoprost 0.005% Ophthalmic Solution 1 Drop(s) Both EYES at bedtime  levothyroxine 112 MICROGram(s) Oral daily  lisinopril 40 milliGRAM(s) Oral daily  meclizine 12.5 milliGRAM(s) Oral every 8 hours  multivitamin/minerals 1 Tablet(s) Oral daily  rivaroxaban 10 milliGRAM(s) Oral daily  simvastatin 10 milliGRAM(s) Oral at bedtime  sodium chloride 0.9%. 1000 milliLiter(s) IV Continuous <Continuous>    MEDICATIONS  (PRN):  acetaminophen     Tablet .. 650 milliGRAM(s) Oral every 6 hours PRN Temp greater or equal to 38C (100.4F), Mild Pain (1 - 3)  melatonin 3 milliGRAM(s) Oral at bedtime PRN Insomnia  ondansetron Injectable 4 milliGRAM(s) IV Push every 8 hours PRN Nausea and/or Vomiting    ___________  Active diet:  Diet, Regular:      Qty per Day:  1  ___________________    ==================>> VITAL SIGNS <<==================     Vital Signs Last 24 HrsT(C): 36.7 (07-26-22 @ 09:19)  T(F): 98.1 (07-26-22 @ 09:19), Max: 98.7 (07-25-22 @ 19:17)  HR: 72 (07-26-22 @ 09:19) (62 - 72)  BP: 125/77 (07-26-22 @ 09:19)  RR: 18 (07-26-22 @ 09:19) (18 - 18)  SpO2: 96% (07-26-22 @ 09:19) (95% - 97%)       ==================>> LAB AND IMAGING <<==================    no labs today     < from: Limited Transthoracic Echo (07.20.22 @ 15:55) >  Conclusions:  Small, hyperdynamic left ventricle.  Moderate pulmonary hypertnesion.  < end of copied text >  ___________________________________________________________________________________  ===============>>  A S S E S S M E N T   A N D   P L A N <<===============  ------------------------------------------------------------------------------------------    · Assessment	  Patient is an 89 yo woman with past medical history of HTN, HLD, Hypothyroidism. Glaucoma, osteoarthritis presents to ed after mechanical trip and fall getting out of bathroom.  pt lives by herself but her grand children live with her.. she woke u p this morning, went to bathroom , voided and was going back to bedroom when she may have tripped, twisted and fell on the floor and could not get up..  pt was unable to ambulates after falling due to pain in left leg  no fractures found  + small hematoma on Rt post scalp..  pt normally walks with bennett at home and walker outside the home     Problem/Plan - 1:  ·  Problem: Fall.   ·  Plan: appears to be mechanical fall . imbalance  pt with  some dizziness with movement >> meclizine ATC ordered   no signs of fracture on imaging  PT  / OOB to chair daily as able emphasized   fall precautions  orthostatics positive  >> KARTIK stockings on  >> improved orthostasis   encourage po intake and hydration  rehab pending >> CM working on it >> pending Auth     Problem/Plan - 2:  ·  Problem: 2019 novel coronavirus disease (COVID-19).   ·  Plan: pt with recent positive in home test   pt not symptomatic / doing well  pt off isolation   no additional testing / management needed  monitor / supportive care  nutrition, hydration  vitamins   >> given elevated D Dimer >> should be on Xarelto 10 mg daily for 30 days > ordered     a Urine culture was sent  is positive .. sensitivities..  noted     post 3 days of abx  : monitor     Problem/Plan - 3:  ·  Problem: Ambulatory dysfunction.   ·  Plan: as above  PT.    Problem/Plan - 4:  ·  Problem: HTN (hypertension).   ·  Plan: hold HCTZ  Continue Current medications otherwise and monitor.  cardio to eval.  AAA monitoring as OP  repeat routine echo as OP to monitor PAH..     Problem/Plan - 5:  ·  Problem: Glaucoma.   ·  Plan: on multiple eye drops  Continue home medications.    -GI/DVT Prophylaxis per protocol.   lovenox   pepcid   nutritional supplement given severe protein calorie malnutrition    DC planing a above     --------------------------------------------  Case discussed with pt, RN..   Education given on findings and plan of care  ___________________________  H. GATITO Thompson.  Pager: [phone]

## 2022-07-27 ENCOUNTER — TRANSCRIPTION ENCOUNTER (OUTPATIENT)
Age: 87
End: 2022-07-27

## 2022-07-27 VITALS — WEIGHT: 113.1 LBS

## 2022-07-27 LAB
ALBUMIN SERPL ELPH-MCNC: 3.2 G/DL — LOW (ref 3.3–5)
ALP SERPL-CCNC: 63 U/L — SIGNIFICANT CHANGE UP (ref 40–120)
ALT FLD-CCNC: 18 U/L — SIGNIFICANT CHANGE UP (ref 10–45)
ANION GAP SERPL CALC-SCNC: 10 MMOL/L — SIGNIFICANT CHANGE UP (ref 5–17)
AST SERPL-CCNC: 18 U/L — SIGNIFICANT CHANGE UP (ref 10–40)
BILIRUB SERPL-MCNC: 0.2 MG/DL — SIGNIFICANT CHANGE UP (ref 0.2–1.2)
BUN SERPL-MCNC: 20 MG/DL — SIGNIFICANT CHANGE UP (ref 7–23)
CALCIUM SERPL-MCNC: 9.8 MG/DL — SIGNIFICANT CHANGE UP (ref 8.4–10.5)
CHLORIDE SERPL-SCNC: 107 MMOL/L — SIGNIFICANT CHANGE UP (ref 96–108)
CO2 SERPL-SCNC: 25 MMOL/L — SIGNIFICANT CHANGE UP (ref 22–31)
CREAT SERPL-MCNC: 0.86 MG/DL — SIGNIFICANT CHANGE UP (ref 0.5–1.3)
EGFR: 65 ML/MIN/1.73M2 — SIGNIFICANT CHANGE UP
GLUCOSE SERPL-MCNC: 94 MG/DL — SIGNIFICANT CHANGE UP (ref 70–99)
HCT VFR BLD CALC: 39.7 % — SIGNIFICANT CHANGE UP (ref 34.5–45)
HGB BLD-MCNC: 13.1 G/DL — SIGNIFICANT CHANGE UP (ref 11.5–15.5)
MAGNESIUM SERPL-MCNC: 1.8 MG/DL — SIGNIFICANT CHANGE UP (ref 1.6–2.6)
MCHC RBC-ENTMCNC: 28.1 PG — SIGNIFICANT CHANGE UP (ref 27–34)
MCHC RBC-ENTMCNC: 33 GM/DL — SIGNIFICANT CHANGE UP (ref 32–36)
MCV RBC AUTO: 85 FL — SIGNIFICANT CHANGE UP (ref 80–100)
NRBC # BLD: 0 /100 WBCS — SIGNIFICANT CHANGE UP (ref 0–0)
PLATELET # BLD AUTO: 352 K/UL — SIGNIFICANT CHANGE UP (ref 150–400)
POTASSIUM SERPL-MCNC: 4.2 MMOL/L — SIGNIFICANT CHANGE UP (ref 3.5–5.3)
POTASSIUM SERPL-SCNC: 4.2 MMOL/L — SIGNIFICANT CHANGE UP (ref 3.5–5.3)
PROT SERPL-MCNC: 7.3 G/DL — SIGNIFICANT CHANGE UP (ref 6–8.3)
RBC # BLD: 4.67 M/UL — SIGNIFICANT CHANGE UP (ref 3.8–5.2)
RBC # FLD: 14.2 % — SIGNIFICANT CHANGE UP (ref 10.3–14.5)
SODIUM SERPL-SCNC: 142 MMOL/L — SIGNIFICANT CHANGE UP (ref 135–145)
WBC # BLD: 8.49 K/UL — SIGNIFICANT CHANGE UP (ref 3.8–10.5)
WBC # FLD AUTO: 8.49 K/UL — SIGNIFICANT CHANGE UP (ref 3.8–10.5)

## 2022-07-27 PROCEDURE — 84443 ASSAY THYROID STIM HORMONE: CPT

## 2022-07-27 PROCEDURE — 85379 FIBRIN DEGRADATION QUANT: CPT

## 2022-07-27 PROCEDURE — 87086 URINE CULTURE/COLONY COUNT: CPT

## 2022-07-27 PROCEDURE — 73562 X-RAY EXAM OF KNEE 3: CPT

## 2022-07-27 PROCEDURE — 80053 COMPREHEN METABOLIC PANEL: CPT

## 2022-07-27 PROCEDURE — U0003: CPT

## 2022-07-27 PROCEDURE — G1004: CPT

## 2022-07-27 PROCEDURE — 93308 TTE F-UP OR LMTD: CPT

## 2022-07-27 PROCEDURE — 93005 ELECTROCARDIOGRAM TRACING: CPT

## 2022-07-27 PROCEDURE — 87186 SC STD MICRODIL/AGAR DIL: CPT

## 2022-07-27 PROCEDURE — 97162 PT EVAL MOD COMPLEX 30 MIN: CPT

## 2022-07-27 PROCEDURE — 87077 CULTURE AEROBIC IDENTIFY: CPT

## 2022-07-27 PROCEDURE — 84134 ASSAY OF PREALBUMIN: CPT

## 2022-07-27 PROCEDURE — 84100 ASSAY OF PHOSPHORUS: CPT

## 2022-07-27 PROCEDURE — 74176 CT ABD & PELVIS W/O CONTRAST: CPT | Mod: MG

## 2022-07-27 PROCEDURE — 97116 GAIT TRAINING THERAPY: CPT

## 2022-07-27 PROCEDURE — 80048 BASIC METABOLIC PNL TOTAL CA: CPT

## 2022-07-27 PROCEDURE — 99285 EMERGENCY DEPT VISIT HI MDM: CPT

## 2022-07-27 PROCEDURE — 86850 RBC ANTIBODY SCREEN: CPT

## 2022-07-27 PROCEDURE — 97530 THERAPEUTIC ACTIVITIES: CPT

## 2022-07-27 PROCEDURE — 85730 THROMBOPLASTIN TIME PARTIAL: CPT

## 2022-07-27 PROCEDURE — 81001 URINALYSIS AUTO W/SCOPE: CPT

## 2022-07-27 PROCEDURE — 70450 CT HEAD/BRAIN W/O DYE: CPT | Mod: MG

## 2022-07-27 PROCEDURE — U0005: CPT

## 2022-07-27 PROCEDURE — 97110 THERAPEUTIC EXERCISES: CPT

## 2022-07-27 PROCEDURE — 86900 BLOOD TYPING SEROLOGIC ABO: CPT

## 2022-07-27 PROCEDURE — 85610 PROTHROMBIN TIME: CPT

## 2022-07-27 PROCEDURE — 85025 COMPLETE CBC W/AUTO DIFF WBC: CPT

## 2022-07-27 PROCEDURE — 72125 CT NECK SPINE W/O DYE: CPT | Mod: MG

## 2022-07-27 PROCEDURE — 71250 CT THORAX DX C-: CPT | Mod: MG

## 2022-07-27 PROCEDURE — 80061 LIPID PANEL: CPT

## 2022-07-27 PROCEDURE — 36415 COLL VENOUS BLD VENIPUNCTURE: CPT

## 2022-07-27 PROCEDURE — 85027 COMPLETE CBC AUTOMATED: CPT

## 2022-07-27 PROCEDURE — 93321 DOPPLER ECHO F-UP/LMTD STD: CPT

## 2022-07-27 PROCEDURE — 83735 ASSAY OF MAGNESIUM: CPT

## 2022-07-27 PROCEDURE — 86901 BLOOD TYPING SEROLOGIC RH(D): CPT

## 2022-07-27 RX ADMIN — Medication 1 TABLET(S): at 13:13

## 2022-07-27 RX ADMIN — LISINOPRIL 40 MILLIGRAM(S): 2.5 TABLET ORAL at 06:06

## 2022-07-27 RX ADMIN — AMLODIPINE BESYLATE 10 MILLIGRAM(S): 2.5 TABLET ORAL at 06:06

## 2022-07-27 RX ADMIN — Medication 81 MILLIGRAM(S): at 13:12

## 2022-07-27 RX ADMIN — RIVAROXABAN 10 MILLIGRAM(S): KIT at 13:13

## 2022-07-27 RX ADMIN — BRIMONIDINE TARTRATE 1 DROP(S): 2 SOLUTION/ DROPS OPHTHALMIC at 06:07

## 2022-07-27 RX ADMIN — Medication 112 MICROGRAM(S): at 06:06

## 2022-07-27 RX ADMIN — Medication 12.5 MILLIGRAM(S): at 06:06

## 2022-07-27 RX ADMIN — BRIMONIDINE TARTRATE 1 DROP(S): 2 SOLUTION/ DROPS OPHTHALMIC at 13:13

## 2022-07-27 RX ADMIN — Medication 12.5 MILLIGRAM(S): at 13:13

## 2022-07-27 RX ADMIN — DORZOLAMIDE HYDROCHLORIDE TIMOLOL MALEATE 1 DROP(S): 20; 5 SOLUTION/ DROPS OPHTHALMIC at 06:07

## 2022-07-27 NOTE — PROGRESS NOTE ADULT - REASON FOR ADMISSION
fall at home

## 2022-07-27 NOTE — PROGRESS NOTE ADULT - PROVIDER SPECIALTY LIST ADULT
Internal Medicine
Internal Medicine
Cardiology
Internal Medicine
Cardiology
Internal Medicine

## 2022-07-27 NOTE — DISCHARGE NOTE NURSING/CASE MANAGEMENT/SOCIAL WORK - PATIENT PORTAL LINK FT
You can access the FollowMyHealth Patient Portal offered by [facility] by registering at the following website: [email or web address]. By joining [facility]’s FollowMyHealth portal, you will also be able to view your health information using other applications (apps) compatible with our system.

## 2022-07-27 NOTE — CHART NOTE - NSCHARTNOTEFT_GEN_A_CORE
Nutrition Follow Up Note  Patient seen for: Initial malnutrition follow up   Chart reviewed, events noted.    Source: [] Patient       [x] EMR        [] RN        [] Family at bedside       [] Other:    -If unable to interview patient: [] Trach/Vent/BiPAP  [] Disoriented/confused/inappropriate to interview    Diet Order:   Diet, Regular:      Qty per Day:  1 (22)    - Is current order appropriate/adequate? [] Yes  []  No:     - PO intake :   [] >75%  Adequate    [] 50-75%  Fair       [] <50%  Poor  Pt reports eating "depending on the meal"; disliking institutional food   as per flowsheets pt consuming ~ meals in house    - Nutrition-related concerns:    GI:  Last BM ___.   Bowel Regimen? [] Yes   [x] No  pt reports constipation; denies nausea /vomiting     Weights:   Daily Weight in k.3 (), Weight in k.1 ()  Drug Dosing Weight  Height (cm): 165.1 (2022 14:01)  Weight (kg): 49.9 (2022 14:01)  BMI (kg/m2): 18.3 (2022 14:01)  BSA (m2): 1.53 (2022 14:01)    Nutritionally Pertinent MEDICATIONS  (STANDING):  amLODIPine   Tablet  levothyroxine  lisinopril  multivitamin/minerals  simvastatin    Pertinent Labs:  @ 06:36: Na 142, BUN 20, Cr 0.86, BG 94, K+ 4.2, Phos --, Mg 1.8, Alk Phos 63, ALT/SGPT 18, AST/SGOT 18, HbA1c --    Skin per nursing documentation:   Edema:     Estimated Needs:   [x] no change since previous assessment  [] recalculated:     Previous Nutrition Diagnosis:   Nutrition Diagnosis is: [x] ongoing  [] resolved [] not applicable     New Nutrition Diagnosis: [x] Not applicable    Nutrition Care Plan:  [x] In Progress- addressed with diet order, PO encouragement and nutritional supplements   [] Achieved  [] Not applicable    Nutrition Interventions:     Education Provided:       [] Yes:  [] No: Encouraged adequate PO intake for meeting nutritional needs, improving nutritional status and healing and for preventing wt loss        Recommendations:         [] Continue current diet order            [] Add oral nutrition supplement:     [] Discontinue current diet order. Recommend:      [] Add micronutrient supplementation:      [] Continue current micronutrient supplementation:      [] Other:     Monitoring and Evaluation:   Continue to monitor nutritional intake, tolerance to diet prescription, weights, labs, skin integrity      RD remains available upon request and will follow up per protocol  Manda King RD CDN #[id number] Nutrition Follow Up Note  Patient seen for: Initial malnutrition follow up   Chart reviewed, events noted.    "Patient is an 89 yo woman with past medical history of HTN, HLD, Hypothyroidism. Glaucoma, osteoarthritis presents to ed after mechanical trip and fall getting out of bathroom.  pt lives by herself but her grand children live with her.. she woke u p this morning, went to bathroom , voided and was going back to bedroom when she may have tripped, twisted and fell on the floor and could not get up..  pt was unable to ambulates after falling due to pain in left leg  no fractures found  + small hematoma on Rt post scalp..  pt normally walks with bennett at home and walker outside the home "    Source: [x] Patient       [x] EMR        [] RN        [] Family at bedside       [] Other:    -If unable to interview patient: [] Trach/Vent/BiPAP  [] Disoriented/confused/inappropriate to interview    Diet Order:   Diet, Regular:      Qty per Day:  1 (22)    - Is current order appropriate/adequate? [x] Yes  []  No:     - PO intake :   [] >75%  Adequate    [] 50-75%  Fair       [] <50%  Poor  Pt reports eating "depending on the meal"; disliking institutional food   as per flowsheets pt consuming ~ meals in house    GI:  Last BM ___.   Bowel Regimen? [] Yes   [x] No  pt reports constipation; denies nausea /vomiting     Weights:   Daily Weight in k.3 (), Weight in k.1 ()  Drug Dosing Weight  Height (cm): 165.1 (2022 14:01)  Weight (kg): 49.9 (2022 14:01)  BMI (kg/m2): 18.3 (2022 14:01)  BSA (m2): 1.53 (2022 14:01)    Nutritionally Pertinent MEDICATIONS  (STANDING):  amLODIPine   Tablet  levothyroxine  lisinopril  multivitamin/minerals  simvastatin    Pertinent Labs:  @ 06:36: Na 142, BUN 20, Cr 0.86, BG 94, K+ 4.2, Phos --, Mg 1.8, Alk Phos 63, ALT/SGPT 18, AST/SGOT 18, HbA1c --    Skin per nursing documentation: no noted pressure injuries as per flowsheets   Edema: +1 gina lE as per flowsheets     Estimated Needs:   [x] no change since previous assessment  [] recalculated:     Previous Nutrition Diagnosis: moderate protein calorie malnutrition   Nutrition Diagnosis is: [x] ongoing  [] resolved [] not applicable     New Nutrition Diagnosis: [x] Not applicable    Nutrition Care Plan:  [x] In Progress- addressed with diet order, PO encouragement and nutritional supplements   [] Achieved  [] Not applicable    Nutrition Interventions:     Education Provided:       [] Yes:  [] No: Encouraged adequate PO intake for meeting nutritional needs, improving nutritional status and healing and for preventing wt loss        Recommendations:         - Continue current diet order     - Continue oral nutrition supplement: Ensure Enlive 2x daily (700 daron and 40 gm protein)      - Encourage adequate consumption of meals/supplements to optimize protein-energy intake      - Nutrition care plan to remain consistent with pt goals of care     Monitoring and Evaluation:   Continue to monitor nutritional intake, tolerance to diet prescription, weights, labs, skin integrity    RD remains available upon request and will follow up per protocol  Manda King RD CDN #[id number]

## 2022-07-27 NOTE — PROGRESS NOTE ADULT - ASSESSMENT
M E D I C A L   A T T E N D I N G    F O L L O W    U P   N O T E  (07-27-22 )                                     ------------------------------------------------------------------------------------------------    patient evaluated by me, case discussed with team, chart, medications, and physical exam reviewed, labs / tests  and vitals reviewed by me, as bellow.   Patient is stable for discharge today. insurance auth given per report , pt overall stable, doing well, ready / asking to go to rehab   Patient to follow up with  JUSTIN and [facility] post rehab   See discharge document for full note.  Greater than 35 min spent for these services.                              ( note written / Date of service  07-27-22 )    ==================>> MEDICATIONS <<====================    amLODIPine   Tablet 10 milliGRAM(s) Oral daily  aspirin enteric coated 81 milliGRAM(s) Oral daily  brimonidine 0.2% Ophthalmic Solution 1 Drop(s) Both EYES three times a day  dorzolamide 2%/timolol 0.5% Ophthalmic Solution 1 Drop(s) Both EYES two times a day  latanoprost 0.005% Ophthalmic Solution 1 Drop(s) Both EYES at bedtime  levothyroxine 112 MICROGram(s) Oral daily  lisinopril 40 milliGRAM(s) Oral daily  meclizine 12.5 milliGRAM(s) Oral every 8 hours  multivitamin/minerals 1 Tablet(s) Oral daily  rivaroxaban 10 milliGRAM(s) Oral daily  simvastatin 10 milliGRAM(s) Oral at bedtime    MEDICATIONS  (PRN):  acetaminophen     Tablet .. 650 milliGRAM(s) Oral every 6 hours PRN Temp greater or equal to 38C (100.4F), Mild Pain (1 - 3)  melatonin 3 milliGRAM(s) Oral at bedtime PRN Insomnia  ondansetron Injectable 4 milliGRAM(s) IV Push every 8 hours PRN Nausea and/or Vomiting    ___________  Active diet:  Diet, Regular:      Qty per Day:  1  ___________________    ==================>> VITAL SIGNS <<==================    T(C): 36.9 (07-27-22 @ 11:55), Max: 36.9 (07-26-22 @ 21:11)  HR: 64 (07-27-22 @ 11:55) (64 - 96)  BP: 128/71 (07-27-22 @ 11:55) (113/63 - 159/85)  BP(mean): --  RR: 18 (07-27-22 @ 11:55) (18 - 18)  SpO2: 100% (07-27-22 @ 11:55) (96% - 100%)       I&O's Summary    26 Jul 2022 07:01  -  27 Jul 2022 07:00  --------------------------------------------------------  IN: 400 mL / OUT: 900 mL / NET: -500 mL    27 Jul 2022 07:01  -  27 Jul 2022 13:17  --------------------------------------------------------  IN: 360 mL / OUT: 0 mL / NET: 360 mL       ==================>> LAB AND IMAGING <<==================                        13.1   8.49  )-----------( 352      ( 27 Jul 2022 06:33 )             39.7        07-27    142  |  107  |  20  ----------------------------<  94  4.2   |  25  |  0.86    Ca    9.8      27 Jul 2022 06:36  Mg     1.8     07-27    TPro  7.3  /  Alb  3.2<L>  /  TBili  0.2  /  DBili  x   /  AST  18  /  ALT  18  /  AlkPhos  63  07-27       TSH:      3.28   (07-17-22)           Lipid profile:  (07-15-22)     Total: 193     LDL  : (p)     HDL  :45     TG   :92       WBC count:   8.49 <<== ,  5.24 <<==   Hemoglobin:   13.1 <<==,  13.3 <<==  platelets:  352 <==, 266 <==    Creatinine:  0.86  <<==, 0.87  <<==  Sodium:   142  <==, 144  <==       AST:          18 <== , 17 <==      ALT:        18  <== , 19  <==      AP:        63  <=, 53  <=     Bili:        0.2  <=, 0.2  <=

## 2022-07-27 NOTE — DISCHARGE NOTE NURSING/CASE MANAGEMENT/SOCIAL WORK - NSDCPEFALRISK_GEN_ALL_CORE
For information on Fall & Injury Prevention, visit: [email or web address] OR  [email or web address] OR  https://www.cdc.gov/steadi/patient.html

## 2022-07-27 NOTE — PROGRESS NOTE ADULT - SUBJECTIVE AND OBJECTIVE BOX
CARDIOLOGY     PROGRESS  NOTE   ________________________________________________    CHIEF COMPLAINT:Patient is a 88y old  Female who presents with a chief complaint of fall at home (15 Jul 2022 16:36)  doing better.  	  REVIEW OF SYSTEMS:  CONSTITUTIONAL: No fever, weight loss, or fatigue  EYES: No eye pain, visual disturbances, or discharge  ENT:  No difficulty hearing, tinnitus, vertigo; No sinus or throat pain  NECK: No pain or stiffness  RESPIRATORY: No cough, wheezing, chills or hemoptysis; No Shortness of Breath  CARDIOVASCULAR: No chest pain, palpitations, passing out, dizziness, or leg swelling  GASTROINTESTINAL: No abdominal or epigastric pain. No nausea, vomiting, or hematemesis; No diarrhea or constipation. No melena or hematochezia.  GENITOURINARY: No dysuria, frequency, hematuria, or incontinence  NEUROLOGICAL: No headaches, memory loss, loss of strength, numbness, or tremors  SKIN: No itching, burning, rashes, or lesions   LYMPH Nodes: No enlarged glands  ENDOCRINE: No heat or cold intolerance; No hair loss  MUSCULOSKELETAL: No joint pain or swelling; No muscle, back, or extremity pain  PSYCHIATRIC: No depression, anxiety, mood swings, or difficulty sleeping  HEME/LYMPH: No easy bruising, or bleeding gums  ALLERGY AND IMMUNOLOGIC: No hives or eczema	    [ ] All others negative	  [ ] Unable to obtain    PHYSICAL EXAM:  T(C): 36.4 (07-16-22 @ 04:30), Max: 36.7 (07-15-22 @ 20:48)  HR: 59 (07-16-22 @ 04:30) (59 - 82)  BP: 156/80 (07-16-22 @ 04:30) (127/74 - 156/80)  RR: 18 (07-16-22 @ 04:30) (16 - 20)  SpO2: 96% (07-16-22 @ 04:30) (95% - 100%)  Wt(kg): --  I&O's Summary    15 Jul 2022 07:01  -  16 Jul 2022 07:00  --------------------------------------------------------  IN: 1560 mL / OUT: 700 mL / NET: 860 mL        Appearance: Normal	  HEENT:   Normal oral mucosa, PERRL, EOMI	  Lymphatic: No lymphadenopathy  Cardiovascular: Normal S1 S2, No JVD, +murmurs, No edema  Respiratory: Lungs clear to auscultation	  Gastrointestinal:  Soft, Non-tender, + BS	  Skin: No rashes, No ecchymoses, No cyanosis	  Neurologic: Non-focal  Extremities: Normal range of motion, No clubbing, cyanosis or edema  Vascular: Peripheral pulses palpable 2+ bilaterally    MEDICATIONS  (STANDING):  amLODIPine   Tablet 10 milliGRAM(s) Oral daily  brimonidine 0.2% Ophthalmic Solution 1 Drop(s) Both EYES three times a day  dorzolamide 2%/timolol 0.5% Ophthalmic Solution 1 Drop(s) Both EYES two times a day  enoxaparin Injectable 40 milliGRAM(s) SubCutaneous every 24 hours  famotidine    Tablet 20 milliGRAM(s) Oral daily  latanoprost 0.005% Ophthalmic Solution 1 Drop(s) Both EYES at bedtime  levothyroxine 112 MICROGram(s) Oral daily  lisinopril 40 milliGRAM(s) Oral daily  multivitamin/minerals 1 Tablet(s) Oral daily  simvastatin 10 milliGRAM(s) Oral at bedtime  sodium chloride 0.9%. 1000 milliLiter(s) (60 mL/Hr) IV Continuous <Continuous>      TELEMETRY: 	    ECG:  	  RADIOLOGY:  OTHER: 	  	  LABS:	 	    CARDIAC MARKERS:                                14.8   6.23  )-----------( 284      ( 15 Jul 2022 10:28 )             45.6     07-15    140  |  99  |  10  ----------------------------<  159<H>  3.2<L>   |  28  |  0.76    Ca    10.3      15 Jul 2022 10:28    TPro  7.8  /  Alb  3.7  /  TBili  0.4  /  DBili  x   /  AST  11  /  ALT  7<L>  /  AlkPhos  61  07-15    proBNP:   Lipid Profile: Cholesterol 193  LDL --  HDL 45  TG 92    HgA1c:   TSH:   PT/INR - ( 14 Jul 2022 15:19 )   PT: 12.0 sec;   INR: 1.04 ratio         PTT - ( 14 Jul 2022 15:19 )  PTT:30.1 sec    < from: 12 Lead ECG (07.14.22 @ 14:55) >  Diagnosis Line SINUS BRADYCARDIA WITH SINUS ARRHYTHMIA        Assessment and plan  ---------------------------  Patient is an 87 yo woman with past medical history of HTN, HLD, Hypothyroidism. Glaucoma, osteoarthritis presents to ed after mechanical trip and fall getting out of bathroom.  pt states she lives by herself but her grand children live with her.. she woke u p this morning, went to bathroom , voided and was going back to bedroom when she may have tripped, twisted and fell on the floor and could not get up..  per report pt tested positive for covid on an in-home test recently  COVID-19 vaccine series completed w booster,  covid test positive here.. pt asymptomatic (except for chronic  ?smokers cough  pt with sig hx of htn hx sound like vasovagal syncope  continue bp meds for now  tsh/lipid  awaiting ecg, noted with sinus bradycardia  echo  dvt prophylaxis  +sig orthostatic hypotension. iv fluid, repeat   elias stocking  physical therapy      	        
           CARDIOLOGY     PROGRESS  NOTE   ________________________________________________    CHIEF COMPLAINT:Patient is a 88y old  Female who presents with a chief complaint of fall at home (19 Jul 2022 13:25)  no complain.  	  REVIEW OF SYSTEMS:  CONSTITUTIONAL: No fever, weight loss, or fatigue  EYES: No eye pain, visual disturbances, or discharge  ENT:  No difficulty hearing, tinnitus, vertigo; No sinus or throat pain  NECK: No pain or stiffness  RESPIRATORY: No cough, wheezing, chills or hemoptysis; No Shortness of Breath  CARDIOVASCULAR: No chest pain, palpitations, passing out, dizziness, or leg swelling  GASTROINTESTINAL: No abdominal or epigastric pain. No nausea, vomiting, or hematemesis; No diarrhea or constipation. No melena or hematochezia.  GENITOURINARY: No dysuria, frequency, hematuria, or incontinence  NEUROLOGICAL: No headaches, memory loss, loss of strength, numbness, or tremors  SKIN: No itching, burning, rashes, or lesions   LYMPH Nodes: No enlarged glands  ENDOCRINE: No heat or cold intolerance; No hair loss  MUSCULOSKELETAL: No joint pain or swelling; No muscle, back, or extremity pain  PSYCHIATRIC: No depression, anxiety, mood swings, or difficulty sleeping  HEME/LYMPH: No easy bruising, or bleeding gums  ALLERGY AND IMMUNOLOGIC: No hives or eczema	    [ ] All others negative	  [ ] Unable to obtain    PHYSICAL EXAM:  T(C): 37.3 (07-20-22 @ 05:12), Max: 37.3 (07-20-22 @ 05:12)  HR: 75 (07-20-22 @ 05:12) (73 - 75)  BP: 131/75 (07-20-22 @ 05:12) (122/78 - 131/75)  RR: 18 (07-20-22 @ 05:12) (18 - 18)  SpO2: 97% (07-20-22 @ 05:12) (93% - 97%)  Wt(kg): --  I&O's Summary    19 Jul 2022 07:01  -  20 Jul 2022 07:00  --------------------------------------------------------  IN: 550 mL / OUT: 950 mL / NET: -400 mL        Appearance: Normal	  HEENT:   Normal oral mucosa, PERRL, EOMI	  Lymphatic: No lymphadenopathy  Cardiovascular: Normal S1 S2, No JVD, + murmurs, No edema  Respiratory: rhonchi  Psychiatry: A & O x 3, Mood & affect appropriate  Gastrointestinal:  Soft, Non-tender, + BS	  Skin: No rashes, No ecchymoses, No cyanosis	  Neurologic: Non-focal  Extremities: Normal range of motion, No clubbing, cyanosis or edema  Vascular: Peripheral pulses palpable 2+ bilaterally    MEDICATIONS  (STANDING):  amLODIPine   Tablet 10 milliGRAM(s) Oral daily  brimonidine 0.2% Ophthalmic Solution 1 Drop(s) Both EYES three times a day  ciprofloxacin     Tablet 250 milliGRAM(s) Oral every 12 hours  dorzolamide 2%/timolol 0.5% Ophthalmic Solution 1 Drop(s) Both EYES two times a day  enoxaparin Injectable 40 milliGRAM(s) SubCutaneous every 24 hours  famotidine    Tablet 20 milliGRAM(s) Oral daily  latanoprost 0.005% Ophthalmic Solution 1 Drop(s) Both EYES at bedtime  levothyroxine 112 MICROGram(s) Oral daily  lisinopril 40 milliGRAM(s) Oral daily  meclizine 12.5 milliGRAM(s) Oral every 8 hours  multivitamin/minerals 1 Tablet(s) Oral daily  simvastatin 10 milliGRAM(s) Oral at bedtime  sodium chloride 0.9%. 1000 milliLiter(s) (60 mL/Hr) IV Continuous <Continuous>      TELEMETRY: 	    ECG:  	  RADIOLOGY:  OTHER: 	  	  LABS:	 	    CARDIAC MARKERS:                                14.2   6.93  )-----------( 300      ( 18 Jul 2022 07:25 )             43.6     07-18    141  |  104  |  14  ----------------------------<  88  3.8   |  27  |  0.94    Ca    9.6      18 Jul 2022 07:25  Phos  2.5     07-18  Mg     1.7     07-18      proBNP:   Lipid Profile: Cholesterol 193  LDL --  HDL 45  TG 92    HgA1c:   TSH: Thyroid Stimulating Hormone, Serum: 3.28 uIU/mL (07-17 @ 07:44)          Assessment and plan  ---------------------------  Patient is an 87 yo woman with past medical history of HTN, HLD, Hypothyroidism. Glaucoma, osteoarthritis presents to ed after mechanical trip and fall getting out of bathroom.  pt states she lives by herself but her grand children live with her.. she woke u p this morning, went to bathroom , voided and was going back to bedroom when she may have tripped, twisted and fell on the floor and could not get up..  per report pt tested positive for covid on an in-home test recently  COVID-19 vaccine series completed w booster,  covid test positive here.. pt asymptomatic (except for chronic  ?smokers cough  pt with sig hx of htn hx sound like vasovagal syncope  continue bp meds for now  tsh/lipid  awaiting ecg, noted with sinus bradycardia  echo  dvt prophylaxis  +sig orthostatic hypotension. iv fluid, repeat   elias stocking  physical therapy  repeat orthostatic no av blocking agent  awaiting orthostatic noted with standing up bp 120, asymptomatic  elias stockings bl/ physical therapy for deconditioning  continue current meds/  repeat orthostatic if symptomatic will decrease norvasc    	        
           CARDIOLOGY     PROGRESS  NOTE   ________________________________________________    CHIEF COMPLAINT:Patient is a 88y old  Female who presents with a chief complaint of fall at home (20 Jul 2022 16:08)  no complain.  	  REVIEW OF SYSTEMS:  CONSTITUTIONAL: No fever, weight loss, or fatigue  EYES: No eye pain, visual disturbances, or discharge  ENT:  No difficulty hearing, tinnitus, vertigo; No sinus or throat pain  NECK: No pain or stiffness  RESPIRATORY: No cough, wheezing, chills or hemoptysis; No Shortness of Breath  CARDIOVASCULAR: No chest pain, palpitations, passing out, dizziness, or leg swelling  GASTROINTESTINAL: No abdominal or epigastric pain. No nausea, vomiting, or hematemesis; No diarrhea or constipation. No melena or hematochezia.  GENITOURINARY: No dysuria, frequency, hematuria, or incontinence  NEUROLOGICAL: No headaches, memory loss, loss of strength, numbness, or tremors  SKIN: No itching, burning, rashes, or lesions   LYMPH Nodes: No enlarged glands  ENDOCRINE: No heat or cold intolerance; No hair loss  MUSCULOSKELETAL: No joint pain or swelling; No muscle, back, or extremity pain  PSYCHIATRIC: No depression, anxiety, mood swings, or difficulty sleeping  HEME/LYMPH: No easy bruising, or bleeding gums  ALLERGY AND IMMUNOLOGIC: No hives or eczema	    [ ] All others negative	  [x ] Unable to obtain    PHYSICAL EXAM:  T(C): 37 (07-21-22 @ 05:27), Max: 37 (07-21-22 @ 05:27)  HR: 65 (07-21-22 @ 05:27) (60 - 70)  BP: 150/74 (07-21-22 @ 05:27) (112/67 - 150/74)  RR: 18 (07-21-22 @ 05:27) (18 - 18)  SpO2: 93% (07-21-22 @ 05:27) (93% - 96%)  Wt(kg): --  I&O's Summary    19 Jul 2022 07:01  -  20 Jul 2022 07:00  --------------------------------------------------------  IN: 550 mL / OUT: 950 mL / NET: -400 mL    20 Jul 2022 07:01  -  21 Jul 2022 06:41  --------------------------------------------------------  IN: 0 mL / OUT: 550 mL / NET: -550 mL        Appearance: Normal	  HEENT:   Normal oral mucosa, PERRL, EOMI	  Lymphatic: No lymphadenopathy  Cardiovascular: Normal S1 S2, No JVD, + murmurs, No edema  Respiratory:rhonchi  Psychiatry: A & O x 3, Mood & affect appropriate  Gastrointestinal:  Soft, Non-tender, + BS	  Skin: No rashes, No ecchymoses, No cyanosis	  Neurologic: Non-focal  Extremities: Normal range of motion, No clubbing, cyanosis or edema  Vascular: Peripheral pulses palpable 2+ bilaterally    MEDICATIONS  (STANDING):  amLODIPine   Tablet 10 milliGRAM(s) Oral daily  brimonidine 0.2% Ophthalmic Solution 1 Drop(s) Both EYES three times a day  ciprofloxacin     Tablet 250 milliGRAM(s) Oral every 12 hours  dorzolamide 2%/timolol 0.5% Ophthalmic Solution 1 Drop(s) Both EYES two times a day  enoxaparin Injectable 40 milliGRAM(s) SubCutaneous every 24 hours  famotidine    Tablet 20 milliGRAM(s) Oral daily  latanoprost 0.005% Ophthalmic Solution 1 Drop(s) Both EYES at bedtime  levothyroxine 112 MICROGram(s) Oral daily  lisinopril 40 milliGRAM(s) Oral daily  meclizine 12.5 milliGRAM(s) Oral every 8 hours  multivitamin/minerals 1 Tablet(s) Oral daily  simvastatin 10 milliGRAM(s) Oral at bedtime  sodium chloride 0.9%. 1000 milliLiter(s) (60 mL/Hr) IV Continuous <Continuous>      TELEMETRY: 	    ECG:  	  RADIOLOGY:  OTHER: 	  	  LABS:	 	    CARDIAC MARKERS:                  proBNP:   Lipid Profile: Cholesterol 193  LDL --  HDL 45  TG 92    HgA1c:   TSH: Thyroid Stimulating Hormone, Serum: 3.28 uIU/mL (07-17 @ 07:44)    < from: Limited Transthoracic Echo (07.20.22 @ 15:55) >  Mitral Valve: Normal mitral valve.  Aortic Valve/Aorta: Normal aortic valve.  Normal aortic root size.  Left Ventricle: Small, hyperdynamic left ventricle.  Right Heart: Normal right atrium. Normal right ventricular  size and function.  Normal tricuspid valve. Mild tricuspid regurgitation.  Pericardium/Pleura: Normal pericardium with no pericardial  effusion.  Hemodynamic: Estimated right atrial pressure is moderately  elevated.  Moderate pulmonary hypertnesion. Estimated PASP 55 mmhg.  ------------------------------------------------------------------------  Conclusions:  Small, hyperdynamic left ventricle.  Moderate pulmonary hypertnesion.    < from: CT Chest No Cont (07.14.22 @ 15:58) >  PLEURA: No pleural effusion. No pneumothorax.  VESSELS: Atherosclerotic changes. Coronary artery calcifications.  HEART: Heart size is normal. No pericardial effusion.  MEDIASTINUM AND EVGENY: No lymphadenopathy.  CHEST WALL AND LOWER NECK: Within normal limits.          Assessment and plan  ---------------------------  Patient is an 89 yo woman with past medical history of HTN, HLD, Hypothyroidism. Glaucoma, osteoarthritis presents to ed after mechanical trip and fall getting out of bathroom.  pt states she lives by herself but her grand children live with her.. she woke u p this morning, went to bathroom , voided and was going back to bedroom when she may have tripped, twisted and fell on the floor and could not get up..  per report pt tested positive for covid on an in-home test recently  COVID-19 vaccine series completed w booster,  covid test positive here.. pt asymptomatic (except for chronic  ?smokers cough  pt with sig hx of htn hx sound like vasovagal syncope  continue bp meds for now  tsh/lipid  awaiting ecg, noted with sinus bradycardia  dvt prophylaxis  +sig orthostatic hypotension. iv fluid, repeat   elias stocking  physical therapy  repeat orthostatic no av blocking agent  awaiting orthostatic noted with standing up bp 120, asymptomatic  elias stockings bl/ physical therapy for deconditioning  continue current meds/  echo noted with mod pulmonary HTN, continue current meds  etiology pht, check dopplers venous  continue calcium channel blocker  bp is over all controlled  AAA/ ASHD on ct, asa daily, fu AAA in 6 months    	        
           CARDIOLOGY     PROGRESS  NOTE   ________________________________________________    CHIEF COMPLAINT:Patient is a 88y old  Female who presents with a chief complaint of fall at home (21 Jul 2022 17:28)  doing better.  	  REVIEW OF SYSTEMS:  CONSTITUTIONAL: No fever, weight loss, or fatigue  EYES: No eye pain, visual disturbances, or discharge  ENT:  No difficulty hearing, tinnitus, vertigo; No sinus or throat pain  NECK: No pain or stiffness  RESPIRATORY: No cough, wheezing, chills or hemoptysis; No Shortness of Breath  CARDIOVASCULAR: No chest pain, palpitations, passing out, dizziness, or leg swelling  GASTROINTESTINAL: No abdominal or epigastric pain. No nausea, vomiting, or hematemesis; No diarrhea or constipation. No melena or hematochezia.  GENITOURINARY: No dysuria, frequency, hematuria, or incontinence  NEUROLOGICAL: No headaches, memory loss, loss of strength, numbness, or tremors  SKIN: No itching, burning, rashes, or lesions   LYMPH Nodes: No enlarged glands  ENDOCRINE: No heat or cold intolerance; No hair loss  MUSCULOSKELETAL: No joint pain or swelling; No muscle, back, or extremity pain  PSYCHIATRIC: No depression, anxiety, mood swings, or difficulty sleeping  HEME/LYMPH: No easy bruising, or bleeding gums  ALLERGY AND IMMUNOLOGIC: No hives or eczema	    [ ] All others negative	  [ x] Unable to obtain    PHYSICAL EXAM:  T(C): 36.7 (07-22-22 @ 05:12), Max: 36.7 (07-22-22 @ 05:12)  HR: 67 (07-22-22 @ 05:12) (56 - 67)  BP: 140/70 (07-22-22 @ 05:12) (128/69 - 140/70)  RR: 18 (07-22-22 @ 05:12) (18 - 18)  SpO2: 94% (07-22-22 @ 05:12) (92% - 94%)  Wt(kg): --  I&O's Summary    21 Jul 2022 07:01  -  22 Jul 2022 07:00  --------------------------------------------------------  IN: 960 mL / OUT: 1200 mL / NET: -240 mL        Appearance: Normal	  HEENT:   Normal oral mucosa, PERRL, EOMI	  Lymphatic: No lymphadenopathy  Cardiovascular: Normal S1 S2, No JVD, + murmurs, No edema  Respiratory:+ rhonchi	  Gastrointestinal:  Soft, Non-tender, + BS	  Skin: No rashes, No ecchymoses, No cyanosis	  Neurologic: Non-focal  Extremities: Normal range of motion, No clubbing, cyanosis or edema  Vascular: Peripheral pulses palpable 2+ bilaterally    MEDICATIONS  (STANDING):  amLODIPine   Tablet 10 milliGRAM(s) Oral daily  aspirin enteric coated 81 milliGRAM(s) Oral daily  brimonidine 0.2% Ophthalmic Solution 1 Drop(s) Both EYES three times a day  dorzolamide 2%/timolol 0.5% Ophthalmic Solution 1 Drop(s) Both EYES two times a day  enoxaparin Injectable 40 milliGRAM(s) SubCutaneous every 24 hours  famotidine    Tablet 20 milliGRAM(s) Oral daily  latanoprost 0.005% Ophthalmic Solution 1 Drop(s) Both EYES at bedtime  levothyroxine 112 MICROGram(s) Oral daily  lisinopril 40 milliGRAM(s) Oral daily  meclizine 12.5 milliGRAM(s) Oral every 8 hours  multivitamin/minerals 1 Tablet(s) Oral daily  simvastatin 10 milliGRAM(s) Oral at bedtime  sodium chloride 0.9%. 1000 milliLiter(s) (60 mL/Hr) IV Continuous <Continuous>      TELEMETRY: 	    ECG:  	  RADIOLOGY:  OTHER: 	  	  LABS:	 	    CARDIAC MARKERS:                  proBNP:   Lipid Profile: Cholesterol 193  LDL --  HDL 45  TG 92    HgA1c:   TSH: Thyroid Stimulating Hormone, Serum: 3.28 uIU/mL (07-17 @ 07:44)    < from: Limited Transthoracic Echo (07.20.22 @ 15:55) >  Mitral Valve: Normal mitral valve.  Aortic Valve/Aorta: Normal aortic valve.  Normal aortic root size.  Left Ventricle: Small, hyperdynamic left ventricle.  Right Heart: Normal right atrium. Normal right ventricular  size and function.  Normal tricuspid valve. Mild tricuspid regurgitation.  Pericardium/Pleura: Normal pericardium with no pericardial  effusion.  Hemodynamic: Estimated right atrial pressure is moderately  elevated.  Moderate pulmonary hypertnesion. Estimated PASP 55 mmhg.  ------------------------------------------------------------------------  Conclusions:  Small, hyperdynamic left ventricle.  Moderate pulmonary hypertnesion.      Assessment and plan  ---------------------------  Patient is an 87 yo woman with past medical history of HTN, HLD, Hypothyroidism. Glaucoma, osteoarthritis presents to ed after mechanical trip and fall getting out of bathroom.  pt states she lives by herself but her grand children live with her.. she woke u p this morning, went to bathroom , voided and was going back to bedroom when she may have tripped, twisted and fell on the floor and could not get up..  per report pt tested positive for covid on an in-home test recently  COVID-19 vaccine series completed w booster,  covid test positive here.. pt asymptomatic (except for chronic  ?smokers cough  pt with sig hx of htn hx sound like vasovagal syncope  continue bp meds for now  tsh/lipid  awaiting ecg, noted with sinus bradycardia  dvt prophylaxis  +sig orthostatic hypotension. iv fluid, repeat   physical therapy  repeat orthostatic no av blocking agent  elias stockings bl/ physical therapy for deconditioning  echo noted with mod pulmonary HTN, continue current meds  etiology pht, check dopplers venous  continue calcium channel blocker  bp is over all controlled  AAA/ ASHD on ct, asa daily, fu AAA in 6 months  not orthostatic, if recurrent will decrease norvasc    	        
           CARDIOLOGY     PROGRESS  NOTE   ________________________________________________    CHIEF COMPLAINT:Patient is a 88y old  Female who presents with a chief complaint of fall at home (23 Jul 2022 11:27)  no complain.  	  REVIEW OF SYSTEMS:  CONSTITUTIONAL: No fever, weight loss, or fatigue  EYES: No eye pain, visual disturbances, or discharge  ENT:  No difficulty hearing, tinnitus, vertigo; No sinus or throat pain  NECK: No pain or stiffness  RESPIRATORY: No cough, wheezing, chills or hemoptysis; No Shortness of Breath  CARDIOVASCULAR: No chest pain, palpitations, passing out, dizziness, or leg swelling  GASTROINTESTINAL: No abdominal or epigastric pain. No nausea, vomiting, or hematemesis; No diarrhea or constipation. No melena or hematochezia.  GENITOURINARY: No dysuria, frequency, hematuria, or incontinence  NEUROLOGICAL: No headaches, memory loss, loss of strength, numbness, or tremors  SKIN: No itching, burning, rashes, or lesions   LYMPH Nodes: No enlarged glands  ENDOCRINE: No heat or cold intolerance; No hair loss  MUSCULOSKELETAL: No joint pain or swelling; No muscle, back, or extremity pain  PSYCHIATRIC: No depression, anxiety, mood swings, or difficulty sleeping  HEME/LYMPH: No easy bruising, or bleeding gums  ALLERGY AND IMMUNOLOGIC: No hives or eczema	    [ ] All others negative	  [ ] Unable to obtain    PHYSICAL EXAM:  T(C): 36.7 (07-24-22 @ 05:15), Max: 37 (07-23-22 @ 20:59)  HR: 67 (07-24-22 @ 05:15) (67 - 69)  BP: 145/79 (07-24-22 @ 05:15) (135/75 - 145/79)  RR: 16 (07-24-22 @ 05:15) (16 - 18)  SpO2: 97% (07-24-22 @ 05:15) (94% - 97%)  Wt(kg): --  I&O's Summary    23 Jul 2022 07:01  -  24 Jul 2022 07:00  --------------------------------------------------------  IN: 0 mL / OUT: 850 mL / NET: -850 mL        Appearance: Normal	  HEENT:   Normal oral mucosa, PERRL, EOMI	  Lymphatic: No lymphadenopathy  Cardiovascular: Normal S1 S2, No JVD, + murmurs, No edema  Respiratory: rhonchi  Psychiatry: A & O x 3, Mood & affect appropriate  Gastrointestinal:  Soft, Non-tender, + BS	  Skin: No rashes, No ecchymoses, No cyanosis	  Neurologic: Non-focal  Extremities: Normal range of motion, No clubbing, cyanosis or edema  Vascular: Peripheral pulses palpable 2+ bilaterally    MEDICATIONS  (STANDING):  amLODIPine   Tablet 10 milliGRAM(s) Oral daily  aspirin enteric coated 81 milliGRAM(s) Oral daily  brimonidine 0.2% Ophthalmic Solution 1 Drop(s) Both EYES three times a day  dorzolamide 2%/timolol 0.5% Ophthalmic Solution 1 Drop(s) Both EYES two times a day  latanoprost 0.005% Ophthalmic Solution 1 Drop(s) Both EYES at bedtime  levothyroxine 112 MICROGram(s) Oral daily  lisinopril 40 milliGRAM(s) Oral daily  meclizine 12.5 milliGRAM(s) Oral every 8 hours  multivitamin/minerals 1 Tablet(s) Oral daily  rivaroxaban 10 milliGRAM(s) Oral daily  simvastatin 10 milliGRAM(s) Oral at bedtime  sodium chloride 0.9%. 1000 milliLiter(s) (60 mL/Hr) IV Continuous <Continuous>      TELEMETRY: 	    ECG:  	  RADIOLOGY:  OTHER: 	  	  LABS:	 	    CARDIAC MARKERS:                                13.3   5.24  )-----------( 266      ( 23 Jul 2022 07:42 )             41.8     07-23    144  |  107  |  32<H>  ----------------------------<  95  4.1   |  24  |  0.87    Ca    10.2      23 Jul 2022 07:24    TPro  7.1  /  Alb  3.0<L>  /  TBili  0.2  /  DBili  x   /  AST  17  /  ALT  19  /  AlkPhos  53  07-23    proBNP:   Lipid Profile: Cholesterol 193  LDL --  HDL 45  TG 92    HgA1c:   TSH: Thyroid Stimulating Hormone, Serum: 3.28 uIU/mL (07-17 @ 07:44)          Assessment and plan  ---------------------------  Patient is an 89 yo woman with past medical history of HTN, HLD, Hypothyroidism. Glaucoma, osteoarthritis presents to ed after mechanical trip and fall getting out of bathroom.  pt states she lives by herself but her grand children live with her.. she woke u p this morning, went to bathroom , voided and was going back to bedroom when she may have tripped, twisted and fell on the floor and could not get up..  per report pt tested positive for covid on an in-home test recently  COVID-19 vaccine series completed w booster,  covid test positive here.. pt asymptomatic (except for chronic  ?smokers cough  pt with sig hx of htn hx sound like vasovagal syncope  continue bp meds for now  tsh/lipid  awaiting ecg, noted with sinus bradycardia  dvt prophylaxis  +sig orthostatic hypotension. iv fluid, repeat   physical therapy  repeat orthostatic no av blocking agent  elias stockings bl/ physical therapy for deconditioning  echo noted with mod pulmonary HTN, continue current meds  etiology pht, check dopplers venous  continue calcium channel blocker  bp is over all controlled  AAA/ ASHD on ct, asa daily, fu AAA in 6 months  not orthostatic, if recurrent will decrease norvasc  physical therapy  continue current meds    	        
           CARDIOLOGY     PROGRESS  NOTE   ________________________________________________    CHIEF COMPLAINT:Patient is a 88y old  Female who presents with a chief complaint of fall at home (24 Jul 2022 17:34)  no complain/ sleeping.  	  REVIEW OF SYSTEMS:  CONSTITUTIONAL: No fever, weight loss, or fatigue  EYES: No eye pain, visual disturbances, or discharge  ENT:  No difficulty hearing, tinnitus, vertigo; No sinus or throat pain  NECK: No pain or stiffness  RESPIRATORY: No cough, wheezing, chills or hemoptysis; No Shortness of Breath  CARDIOVASCULAR: No chest pain, palpitations, passing out, dizziness, or leg swelling  GASTROINTESTINAL: No abdominal or epigastric pain. No nausea, vomiting, or hematemesis; No diarrhea or constipation. No melena or hematochezia.  GENITOURINARY: No dysuria, frequency, hematuria, or incontinence  NEUROLOGICAL: No headaches, memory loss, loss of strength, numbness, or tremors  SKIN: No itching, burning, rashes, or lesions   LYMPH Nodes: No enlarged glands  ENDOCRINE: No heat or cold intolerance; No hair loss  MUSCULOSKELETAL: No joint pain or swelling; No muscle, back, or extremity pain  PSYCHIATRIC: No depression, anxiety, mood swings, or difficulty sleeping  HEME/LYMPH: No easy bruising, or bleeding gums  ALLERGY AND IMMUNOLOGIC: No hives or eczema	    [ ] All others negative	  [ x] Unable to obtain    PHYSICAL EXAM:  T(C): 36.9 (07-24-22 @ 22:00), Max: 37.1 (07-24-22 @ 16:15)  HR: 98 (07-24-22 @ 22:00) (62 - 98)  BP: 140/72 (07-24-22 @ 22:00) (137/70 - 140/72)  RR: 16 (07-24-22 @ 22:00) (16 - 18)  SpO2: 95% (07-24-22 @ 22:00) (95% - 96%)  Wt(kg): --  I&O's Summary    24 Jul 2022 07:01  -  25 Jul 2022 07:00  --------------------------------------------------------  IN: 720 mL / OUT: 200 mL / NET: 520 mL        Appearance: Normal	  HEENT:   Normal oral mucosa, PERRL, EOMI	  Lymphatic: No lymphadenopathy  Cardiovascular: Normal S1 S2, No JVD, +murmurs, No edema  Respiratory: Lungs clear to auscultation	  Gastrointestinal:  Soft, Non-tender, + BS	  Skin: No rashes, No ecchymoses, No cyanosis	  Neurologic: Non-focal  Extremities: Normal range of motion, No clubbing, cyanosis or edema  Vascular: Peripheral pulses palpable 2+ bilaterally    MEDICATIONS  (STANDING):  amLODIPine   Tablet 10 milliGRAM(s) Oral daily  aspirin enteric coated 81 milliGRAM(s) Oral daily  brimonidine 0.2% Ophthalmic Solution 1 Drop(s) Both EYES three times a day  dorzolamide 2%/timolol 0.5% Ophthalmic Solution 1 Drop(s) Both EYES two times a day  latanoprost 0.005% Ophthalmic Solution 1 Drop(s) Both EYES at bedtime  levothyroxine 112 MICROGram(s) Oral daily  lisinopril 40 milliGRAM(s) Oral daily  meclizine 12.5 milliGRAM(s) Oral every 8 hours  multivitamin/minerals 1 Tablet(s) Oral daily  rivaroxaban 10 milliGRAM(s) Oral daily  simvastatin 10 milliGRAM(s) Oral at bedtime  sodium chloride 0.9%. 1000 milliLiter(s) (60 mL/Hr) IV Continuous <Continuous>      TELEMETRY: 	    ECG:  	  RADIOLOGY:  OTHER: 	  	  LABS:	 	    CARDIAC MARKERS:                                13.3   5.24  )-----------( 266      ( 23 Jul 2022 07:42 )             41.8     07-23    144  |  107  |  32<H>  ----------------------------<  95  4.1   |  24  |  0.87    Ca    10.2      23 Jul 2022 07:24    TPro  7.1  /  Alb  3.0<L>  /  TBili  0.2  /  DBili  x   /  AST  17  /  ALT  19  /  AlkPhos  53  07-23    proBNP:   Lipid Profile: Cholesterol 193  LDL --  HDL 45  TG 92    HgA1c:   TSH: Thyroid Stimulating Hormone, Serum: 3.28 uIU/mL (07-17 @ 07:44)          Assessment and plan  ---------------------------  Patient is an 87 yo woman with past medical history of HTN, HLD, Hypothyroidism. Glaucoma, osteoarthritis presents to ed after mechanical trip and fall getting out of bathroom.  pt states she lives by herself but her grand children live with her.. she woke u p this morning, went to bathroom , voided and was going back to bedroom when she may have tripped, twisted and fell on the floor and could not get up..  per report pt tested positive for covid on an in-home test recently  COVID-19 vaccine series completed w booster,  covid test positive here.. pt asymptomatic (except for chronic  ?smokers cough  pt with sig hx of htn hx sound like vasovagal syncope  continue bp meds for now  tsh/lipid  awaiting ecg, noted with sinus bradycardia  dvt prophylaxis  +sig orthostatic hypotension. iv fluid, repeat   physical therapy  repeat orthostatic no av blocking agent  elias stockings bl/ physical therapy for deconditioning  echo noted with mod pulmonary HTN, continue current meds  etiology pht, check dopplers venous  continue calcium channel blocker  bp is over all controlled  AAA/ ASHD on ct, asa daily, fu AAA in 6 months  not orthostatic, if recurrent will decrease norvasc  physical therapy  continue current meds  still orthosttaic, but sbp standing 120, doubt pt symptomatic  elias stockings    	        
           CARDIOLOGY     PROGRESS  NOTE   ________________________________________________    CHIEF COMPLAINT:Patient is a 88y old  Female who presents with a chief complaint of fall at home (25 Jul 2022 16:45)  no complain.  	  REVIEW OF SYSTEMS:  CONSTITUTIONAL: No fever, weight loss, or fatigue  EYES: No eye pain, visual disturbances, or discharge  ENT:  No difficulty hearing, tinnitus, vertigo; No sinus or throat pain  NECK: No pain or stiffness  RESPIRATORY: No cough, wheezing, chills or hemoptysis; No Shortness of Breath  CARDIOVASCULAR: No chest pain, palpitations, passing out, dizziness, or leg swelling  GASTROINTESTINAL: No abdominal or epigastric pain. No nausea, vomiting, or hematemesis; No diarrhea or constipation. No melena or hematochezia.  GENITOURINARY: No dysuria, frequency, hematuria, or incontinence  NEUROLOGICAL: No headaches, memory loss, loss of strength, numbness, or tremors  SKIN: No itching, burning, rashes, or lesions   LYMPH Nodes: No enlarged glands  ENDOCRINE: No heat or cold intolerance; No hair loss  MUSCULOSKELETAL: No joint pain or swelling; No muscle, back, or extremity pain  PSYCHIATRIC: No depression, anxiety, mood swings, or difficulty sleeping  HEME/LYMPH: No easy bruising, or bleeding gums  ALLERGY AND IMMUNOLOGIC: No hives or eczema	    [ ] All others negative	  [ ] Unable to obtain    PHYSICAL EXAM:  T(C): 36.8 (07-26-22 @ 04:53), Max: 37.1 (07-25-22 @ 19:17)  HR: 62 (07-26-22 @ 04:53) (62 - 91)  BP: 142/82 (07-26-22 @ 04:53) (118/75 - 144/78)  RR: 18 (07-26-22 @ 04:53) (18 - 18)  SpO2: 97% (07-26-22 @ 04:53) (95% - 97%)  Wt(kg): --  I&O's Summary    25 Jul 2022 07:01  -  26 Jul 2022 07:00  --------------------------------------------------------  IN: 900 mL / OUT: 1400 mL / NET: -500 mL        Appearance: Normal	  HEENT:   Normal oral mucosa, PERRL, EOMI	  Lymphatic: No lymphadenopathy  Cardiovascular: Normal S1 S2, No JVD, + murmurs, No edema  Respiratory: Lungs clear to auscultation	  Psychiatry: A & O x 3, Mood & affect appropriate  Gastrointestinal:  Soft, Non-tender, + BS	  Skin: No rashes, No ecchymoses, No cyanosis	  Neurologic: Non-focal  Extremities: Normal range of motion, No clubbing, cyanosis or edema  Vascular: Peripheral pulses palpable 2+ bilaterally    MEDICATIONS  (STANDING):  amLODIPine   Tablet 10 milliGRAM(s) Oral daily  aspirin enteric coated 81 milliGRAM(s) Oral daily  brimonidine 0.2% Ophthalmic Solution 1 Drop(s) Both EYES three times a day  dorzolamide 2%/timolol 0.5% Ophthalmic Solution 1 Drop(s) Both EYES two times a day  latanoprost 0.005% Ophthalmic Solution 1 Drop(s) Both EYES at bedtime  levothyroxine 112 MICROGram(s) Oral daily  lisinopril 40 milliGRAM(s) Oral daily  meclizine 12.5 milliGRAM(s) Oral every 8 hours  multivitamin/minerals 1 Tablet(s) Oral daily  rivaroxaban 10 milliGRAM(s) Oral daily  simvastatin 10 milliGRAM(s) Oral at bedtime  sodium chloride 0.9%. 1000 milliLiter(s) (60 mL/Hr) IV Continuous <Continuous>      TELEMETRY: 	    ECG:  	  RADIOLOGY:  OTHER: 	  	  LABS:	 	    CARDIAC MARKERS:                  proBNP:   Lipid Profile: Cholesterol 193  LDL --  HDL 45  TG 92    HgA1c:   TSH: Thyroid Stimulating Hormone, Serum: 3.28 uIU/mL (07-17 @ 07:44)          Assessment and plan  ---------------------------  Patient is an 89 yo woman with past medical history of HTN, HLD, Hypothyroidism. Glaucoma, osteoarthritis presents to ed after mechanical trip and fall getting out of bathroom.  pt states she lives by herself but her grand children live with her.. she woke u p this morning, went to bathroom , voided and was going back to bedroom when she may have tripped, twisted and fell on the floor and could not get up..  per report pt tested positive for covid on an in-home test recently  COVID-19 vaccine series completed w booster,  covid test positive here.. pt asymptomatic (except for chronic  ?smokers cough  pt with sig hx of htn hx sound like vasovagal syncope  continue bp meds for now  tsh/lipid  awaiting ecg, noted with sinus bradycardia  dvt prophylaxis  +sig orthostatic hypotension. iv fluid, repeat   physical therapy  repeat orthostatic no av blocking agent  elias stockings bl/ physical therapy for deconditioning  echo noted with mod pulmonary HTN, continue current meds  etiology pht, check dopplers venous  continue calcium channel blocker  bp is over all controlled  AAA/ ASHD on ct, asa daily, fu AAA in 6 months  not orthostatic, if recurrent will decrease norvasc  physical therapy  continue current meds  still orthosttaic, but sbp standing 120, doubt pt symptomatic  elias stockings  dc xarelto after total of one month post covid  fu as out pt    	        
           CARDIOLOGY     PROGRESS  NOTE   ________________________________________________    CHIEF COMPLAINT:Patient is a 88y old  Female who presents with a chief complaint of fall at home (17 Jul 2022 13:08)  no complain.  	  REVIEW OF SYSTEMS:  CONSTITUTIONAL: No fever, weight loss, or fatigue  EYES: No eye pain, visual disturbances, or discharge  ENT:  No difficulty hearing, tinnitus, vertigo; No sinus or throat pain  NECK: No pain or stiffness  RESPIRATORY: No cough, wheezing, chills or hemoptysis; No Shortness of Breath  CARDIOVASCULAR: No chest pain, palpitations, passing out, dizziness, or leg swelling  GASTROINTESTINAL: No abdominal or epigastric pain. No nausea, vomiting, or hematemesis; No diarrhea or constipation. No melena or hematochezia.  GENITOURINARY: No dysuria, frequency, hematuria, or incontinence  NEUROLOGICAL: No headaches, memory loss, loss of strength, numbness, or tremors  SKIN: No itching, burning, rashes, or lesions   LYMPH Nodes: No enlarged glands  ENDOCRINE: No heat or cold intolerance; No hair loss  MUSCULOSKELETAL: No joint pain or swelling; No muscle, back, or extremity pain  PSYCHIATRIC: No depression, anxiety, mood swings, or difficulty sleeping  HEME/LYMPH: No easy bruising, or bleeding gums  ALLERGY AND IMMUNOLOGIC: No hives or eczema	    [ ] All others negative	  [x ] Unable to obtain    PHYSICAL EXAM:  T(C): 36.9 (07-17-22 @ 21:01), Max: 36.9 (07-17-22 @ 14:38)  HR: 68 (07-17-22 @ 21:01) (55 - 68)  BP: 118/78 (07-17-22 @ 21:01) (111/67 - 118/78)  RR: 20 (07-17-22 @ 21:01) (18 - 20)  SpO2: 98% (07-17-22 @ 21:01) (97% - 98%)  Wt(kg): --  I&O's Summary    16 Jul 2022 07:01  -  17 Jul 2022 07:00  --------------------------------------------------------  IN: 2450 mL / OUT: 1200 mL / NET: 1250 mL    17 Jul 2022 07:01  -  18 Jul 2022 06:39  --------------------------------------------------------  IN: 960 mL / OUT: 500 mL / NET: 460 mL        Appearance: Normal	  HEENT:   Normal oral mucosa, PERRL, EOMI	  Lymphatic: No lymphadenopathy  Cardiovascular: Normal S1 S2, No JVD, + murmurs, No edema  Respiratory:rhonchi  Gastrointestinal:  Soft, Non-tender, + BS	  Skin: No rashes, No ecchymoses, No cyanosis	  Neurologic: Non-focal  Extremities: Normal range of motion, No clubbing, cyanosis or edema  Vascular: Peripheral pulses palpable 2+ bilaterally    MEDICATIONS  (STANDING):  amLODIPine   Tablet 10 milliGRAM(s) Oral daily  brimonidine 0.2% Ophthalmic Solution 1 Drop(s) Both EYES three times a day  dorzolamide 2%/timolol 0.5% Ophthalmic Solution 1 Drop(s) Both EYES two times a day  enoxaparin Injectable 40 milliGRAM(s) SubCutaneous every 24 hours  famotidine    Tablet 20 milliGRAM(s) Oral daily  latanoprost 0.005% Ophthalmic Solution 1 Drop(s) Both EYES at bedtime  levothyroxine 112 MICROGram(s) Oral daily  lisinopril 40 milliGRAM(s) Oral daily  meclizine 12.5 milliGRAM(s) Oral every 8 hours  multivitamin/minerals 1 Tablet(s) Oral daily  simvastatin 10 milliGRAM(s) Oral at bedtime  sodium chloride 0.9%. 1000 milliLiter(s) (60 mL/Hr) IV Continuous <Continuous>      TELEMETRY: 	    ECG:  	  RADIOLOGY:  OTHER: 	  	  LABS:	 	    CARDIAC MARKERS:            07-16    139  |  103  |  6<L>  ----------------------------<  79  3.7   |  20<L>  |  0.74    Ca    8.8      16 Jul 2022 07:21      proBNP:   Lipid Profile: Cholesterol 193  LDL --  HDL 45  TG 92    HgA1c:   TSH: Thyroid Stimulating Hormone, Serum: 3.28 uIU/mL (07-17 @ 07:44)          Assessment and plan  ---------------------------  Patient is an 87 yo woman with past medical history of HTN, HLD, Hypothyroidism. Glaucoma, osteoarthritis presents to ed after mechanical trip and fall getting out of bathroom.  pt states she lives by herself but her grand children live with her.. she woke u p this morning, went to bathroom , voided and was going back to bedroom when she may have tripped, twisted and fell on the floor and could not get up..  per report pt tested positive for covid on an in-home test recently  COVID-19 vaccine series completed w booster,  covid test positive here.. pt asymptomatic (except for chronic  ?smokers cough  pt with sig hx of htn hx sound like vasovagal syncope  continue bp meds for now  tsh/lipid  awaiting ecg, noted with sinus bradycardia  echo  dvt prophylaxis  +sig orthostatic hypotension. iv fluid, repeat   leias stocking  physical therapy  repeat orthostatic no av blocking agent  awaiting orthostatic from today      	        
           CARDIOLOGY     PROGRESS  NOTE   ________________________________________________    CHIEF COMPLAINT:Patient is a 88y old  Female who presents with a chief complaint of fall at home (22 Jul 2022 14:42)  no complain.  	  REVIEW OF SYSTEMS:  CONSTITUTIONAL: No fever, weight loss, or fatigue  EYES: No eye pain, visual disturbances, or discharge  ENT:  No difficulty hearing, tinnitus, vertigo; No sinus or throat pain  NECK: No pain or stiffness  RESPIRATORY: No cough, wheezing, chills or hemoptysis; No Shortness of Breath  CARDIOVASCULAR: No chest pain, palpitations, passing out, dizziness, or leg swelling  GASTROINTESTINAL: No abdominal or epigastric pain. No nausea, vomiting, or hematemesis; No diarrhea or constipation. No melena or hematochezia.  GENITOURINARY: No dysuria, frequency, hematuria, or incontinence  NEUROLOGICAL: No headaches, memory loss, loss of strength, numbness, or tremors  SKIN: No itching, burning, rashes, or lesions   LYMPH Nodes: No enlarged glands  ENDOCRINE: No heat or cold intolerance; No hair loss  MUSCULOSKELETAL: No joint pain or swelling; No muscle, back, or extremity pain  PSYCHIATRIC: No depression, anxiety, mood swings, or difficulty sleeping  HEME/LYMPH: No easy bruising, or bleeding gums  ALLERGY AND IMMUNOLOGIC: No hives or eczema	    [ ] All others negative	  [ ] Unable to obtain    PHYSICAL EXAM:  T(C): 36.8 (07-23-22 @ 06:11), Max: 36.8 (07-23-22 @ 06:11)  HR: 73 (07-23-22 @ 06:11) (65 - 76)  BP: 139/72 (07-23-22 @ 06:11) (136/67 - 139/72)  RR: 16 (07-23-22 @ 06:11) (16 - 18)  SpO2: 95% (07-23-22 @ 06:11) (92% - 95%)  Wt(kg): --  I&O's Summary    22 Jul 2022 07:01  -  23 Jul 2022 07:00  --------------------------------------------------------  IN: 700 mL / OUT: 1200 mL / NET: -500 mL        Appearance: Normal	  HEENT:   Normal oral mucosa, PERRL, EOMI	  Lymphatic: No lymphadenopathy  Cardiovascular: Normal S1 S2, No JVD, + murmurs, No edema  Respiratory: rhonchi  Gastrointestinal:  Soft, Non-tender, + BS	  Skin: No rashes, No ecchymoses, No cyanosis	  Neurologic: Non-focal  Extremities: Normal range of motion, No clubbing, cyanosis or edema  Vascular: Peripheral pulses palpable 2+ bilaterally    MEDICATIONS  (STANDING):  amLODIPine   Tablet 10 milliGRAM(s) Oral daily  aspirin enteric coated 81 milliGRAM(s) Oral daily  brimonidine 0.2% Ophthalmic Solution 1 Drop(s) Both EYES three times a day  dorzolamide 2%/timolol 0.5% Ophthalmic Solution 1 Drop(s) Both EYES two times a day  famotidine    Tablet 20 milliGRAM(s) Oral daily  latanoprost 0.005% Ophthalmic Solution 1 Drop(s) Both EYES at bedtime  levothyroxine 112 MICROGram(s) Oral daily  lisinopril 40 milliGRAM(s) Oral daily  meclizine 12.5 milliGRAM(s) Oral every 8 hours  multivitamin/minerals 1 Tablet(s) Oral daily  rivaroxaban 10 milliGRAM(s) Oral daily  simvastatin 10 milliGRAM(s) Oral at bedtime  sodium chloride 0.9%. 1000 milliLiter(s) (60 mL/Hr) IV Continuous <Continuous>      TELEMETRY: 	    ECG:  	  RADIOLOGY:  OTHER: 	  	  LABS:	 	    CARDIAC MARKERS:                                13.3   5.24  )-----------( 266      ( 23 Jul 2022 07:42 )             41.8           proBNP:   Lipid Profile: Cholesterol 193  LDL --  HDL 45  TG 92    HgA1c:   TSH: Thyroid Stimulating Hormone, Serum: 3.28 uIU/mL (07-17 @ 07:44)          Assessment and plan  ---------------------------  Patient is an 89 yo woman with past medical history of HTN, HLD, Hypothyroidism. Glaucoma, osteoarthritis presents to ed after mechanical trip and fall getting out of bathroom.  pt states she lives by herself but her grand children live with her.. she woke u p this morning, went to bathroom , voided and was going back to bedroom when she may have tripped, twisted and fell on the floor and could not get up..  per report pt tested positive for covid on an in-home test recently  COVID-19 vaccine series completed w booster,  covid test positive here.. pt asymptomatic (except for chronic  ?smokers cough  pt with sig hx of htn hx sound like vasovagal syncope  continue bp meds for now  tsh/lipid  awaiting ecg, noted with sinus bradycardia  dvt prophylaxis  +sig orthostatic hypotension. iv fluid, repeat   physical therapy  repeat orthostatic no av blocking agent  elias stockings bl/ physical therapy for deconditioning  echo noted with mod pulmonary HTN, continue current meds  etiology pht, check dopplers venous  continue calcium channel blocker  bp is over all controlled  AAA/ ASHD on ct, asa daily, fu AAA in 6 months  not orthostatic, if recurrent will decrease norvasc  physical therapy    	        
           CARDIOLOGY     PROGRESS  NOTE   ________________________________________________    CHIEF COMPLAINT:Patient is a 88y old  Female who presents with a chief complaint of fall at home (16 Jul 2022 12:22)  no complain.  	  REVIEW OF SYSTEMS:  CONSTITUTIONAL: No fever, weight loss, or fatigue  EYES: No eye pain, visual disturbances, or discharge  ENT:  No difficulty hearing, tinnitus, vertigo; No sinus or throat pain  NECK: No pain or stiffness  RESPIRATORY: No cough, wheezing, chills or hemoptysis; No Shortness of Breath  CARDIOVASCULAR: No chest pain, palpitations, passing out, dizziness, or leg swelling  GASTROINTESTINAL: No abdominal or epigastric pain. No nausea, vomiting, or hematemesis; No diarrhea or constipation. No melena or hematochezia.  GENITOURINARY: No dysuria, frequency, hematuria, or incontinence  NEUROLOGICAL: No headaches, memory loss, loss of strength, numbness, or tremors  SKIN: No itching, burning, rashes, or lesions   LYMPH Nodes: No enlarged glands  ENDOCRINE: No heat or cold intolerance; No hair loss  MUSCULOSKELETAL: No joint pain or swelling; No muscle, back, or extremity pain  PSYCHIATRIC: No depression, anxiety, mood swings, or difficulty sleeping  HEME/LYMPH: No easy bruising, or bleeding gums  ALLERGY AND IMMUNOLOGIC: No hives or eczema	    [ ] All others negative	  [x ] Unable to obtain    PHYSICAL EXAM:  T(C): 36.7 (07-17-22 @ 04:26), Max: 36.7 (07-16-22 @ 14:30)  HR: 61 (07-17-22 @ 04:26) (61 - 61)  BP: 148/79 (07-17-22 @ 04:26) (148/79 - 148/79)  RR: 18 (07-17-22 @ 04:26) (18 - 18)  SpO2: 96% (07-17-22 @ 04:26) (96% - 97%)  Wt(kg): --  I&O's Summary    16 Jul 2022 07:01  -  17 Jul 2022 07:00  --------------------------------------------------------  IN: 2450 mL / OUT: 1200 mL / NET: 1250 mL        Appearance: Normal	  HEENT:   Normal oral mucosa, PERRL, EOMI	  Lymphatic: No lymphadenopathy  Cardiovascular: Normal S1 S2, No JVD, + murmurs, No edema  Respiratory: rhonchi  Gastrointestinal:  Soft, Non-tender, + BS	  Skin: No rashes, No ecchymoses, No cyanosis	  Neurologic: Non-focal  Extremities: Normal range of motion, No clubbing, cyanosis or edema  Vascular: Peripheral pulses palpable 2+ bilaterally    MEDICATIONS  (STANDING):  amLODIPine   Tablet 10 milliGRAM(s) Oral daily  brimonidine 0.2% Ophthalmic Solution 1 Drop(s) Both EYES three times a day  dorzolamide 2%/timolol 0.5% Ophthalmic Solution 1 Drop(s) Both EYES two times a day  enoxaparin Injectable 40 milliGRAM(s) SubCutaneous every 24 hours  famotidine    Tablet 20 milliGRAM(s) Oral daily  latanoprost 0.005% Ophthalmic Solution 1 Drop(s) Both EYES at bedtime  levothyroxine 112 MICROGram(s) Oral daily  lisinopril 40 milliGRAM(s) Oral daily  multivitamin/minerals 1 Tablet(s) Oral daily  simvastatin 10 milliGRAM(s) Oral at bedtime  sodium chloride 0.9%. 1000 milliLiter(s) (60 mL/Hr) IV Continuous <Continuous>      TELEMETRY: 	    ECG:  	  RADIOLOGY:  OTHER: 	  	  LABS:	 	    CARDIAC MARKERS:                                14.8   6.23  )-----------( 284      ( 15 Jul 2022 10:28 )             45.6     07-16    139  |  103  |  6<L>  ----------------------------<  79  3.7   |  20<L>  |  0.74    Ca    8.8      16 Jul 2022 07:21    TPro  7.8  /  Alb  3.7  /  TBili  0.4  /  DBili  x   /  AST  11  /  ALT  7<L>  /  AlkPhos  61  07-15    proBNP:   Lipid Profile: Cholesterol 193  LDL --  HDL 45  TG 92    HgA1c:   TSH:       < from: 12 Lead ECG (07.15.22 @ 07:48) >  Ventricular Rate 54 BPM    Atrial Rate 54 BPM    P-R Interval 168 ms    QRS Duration 74 ms    Q-T Interval 450 ms    QTC Calculation(Bazett) 426 ms    P Axis -13 degrees    R Axis 13 degrees    T Axis 1 degrees    Diagnosis Line SINUS BRADYCARDIA        Assessment and plan  ---------------------------  Patient is an 89 yo woman with past medical history of HTN, HLD, Hypothyroidism. Glaucoma, osteoarthritis presents to ed after mechanical trip and fall getting out of bathroom.  pt states she lives by herself but her grand children live with her.. she woke u p this morning, went to bathroom , voided and was going back to bedroom when she may have tripped, twisted and fell on the floor and could not get up..  per report pt tested positive for covid on an in-home test recently  COVID-19 vaccine series completed w booster,  covid test positive here.. pt asymptomatic (except for chronic  ?smokers cough  pt with sig hx of htn hx sound like vasovagal syncope  continue bp meds for now  tsh/lipid  awaiting ecg, noted with sinus bradycardia  echo  dvt prophylaxis  +sig orthostatic hypotension. iv fluid, repeat   elias stocking  physical therapy  repeat orthostatic no av blocking agent      	        
           CARDIOLOGY     PROGRESS  NOTE   ________________________________________________    CHIEF COMPLAINT:Patient is a 88y old  Female who presents with a chief complaint of fall at home (18 Jul 2022 17:27)  doing better.  	  REVIEW OF SYSTEMS:  CONSTITUTIONAL: No fever, weight loss, or fatigue  EYES: No eye pain, visual disturbances, or discharge  ENT:  No difficulty hearing, tinnitus, vertigo; No sinus or throat pain  NECK: No pain or stiffness  RESPIRATORY: No cough, wheezing, chills or hemoptysis; + mild  Shortness of Breath  CARDIOVASCULAR: No chest pain, palpitations, passing out, dizziness, or leg swelling  GASTROINTESTINAL: No abdominal or epigastric pain. No nausea, vomiting, or hematemesis; No diarrhea or constipation. No melena or hematochezia.  GENITOURINARY: No dysuria, frequency, hematuria, or incontinence  NEUROLOGICAL: No headaches, memory loss, loss of strength, numbness, or tremors  SKIN: No itching, burning, rashes, or lesions   LYMPH Nodes: No enlarged glands  ENDOCRINE: No heat or cold intolerance; No hair loss  MUSCULOSKELETAL: No joint pain or swelling; No muscle, back, or extremity pain  PSYCHIATRIC: No depression, anxiety, mood swings, or difficulty sleeping  HEME/LYMPH: No easy bruising, or bleeding gums  ALLERGY AND IMMUNOLOGIC: No hives or eczema	    [ ] All others negative	  [ ] Unable to obtain    PHYSICAL EXAM:  T(C): 36.7 (07-19-22 @ 04:45), Max: 36.8 (07-18-22 @ 19:30)  HR: 67 (07-19-22 @ 04:45) (67 - 73)  BP: 137/73 (07-19-22 @ 04:45) (132/77 - 137/73)  RR: 18 (07-19-22 @ 04:45) (18 - 18)  SpO2: 94% (07-19-22 @ 04:45) (93% - 96%)  Wt(kg): --  I&O's Summary    17 Jul 2022 07:01  -  18 Jul 2022 07:00  --------------------------------------------------------  IN: 960 mL / OUT: 650 mL / NET: 310 mL    18 Jul 2022 07:01  -  19 Jul 2022 06:42  --------------------------------------------------------  IN: 620 mL / OUT: 400 mL / NET: 220 mL        Appearance: Normal	  HEENT:   Normal oral mucosa, PERRL, EOMI	  Lymphatic: No lymphadenopathy  Cardiovascular: Normal S1 S2, No JVD, + murmurs, No edema  Respiratory: rhonchi  Gastrointestinal:  Soft, Non-tender, + BS	  Skin: No rashes, No ecchymoses, No cyanosis	  Neurologic: Non-focal  Extremities: Normal range of motion, No clubbing, cyanosis or edema  Vascular: Peripheral pulses palpable 2+ bilaterally    MEDICATIONS  (STANDING):  amLODIPine   Tablet 10 milliGRAM(s) Oral daily  brimonidine 0.2% Ophthalmic Solution 1 Drop(s) Both EYES three times a day  ciprofloxacin     Tablet 250 milliGRAM(s) Oral every 12 hours  dorzolamide 2%/timolol 0.5% Ophthalmic Solution 1 Drop(s) Both EYES two times a day  enoxaparin Injectable 40 milliGRAM(s) SubCutaneous every 24 hours  famotidine    Tablet 20 milliGRAM(s) Oral daily  latanoprost 0.005% Ophthalmic Solution 1 Drop(s) Both EYES at bedtime  levothyroxine 112 MICROGram(s) Oral daily  lisinopril 40 milliGRAM(s) Oral daily  meclizine 12.5 milliGRAM(s) Oral every 8 hours  multivitamin/minerals 1 Tablet(s) Oral daily  simvastatin 10 milliGRAM(s) Oral at bedtime  sodium chloride 0.9%. 1000 milliLiter(s) (60 mL/Hr) IV Continuous <Continuous>      TELEMETRY: 	    ECG:  	  RADIOLOGY:  OTHER: 	  	  LABS:	 	    CARDIAC MARKERS:                                14.2   6.93  )-----------( 300      ( 18 Jul 2022 07:25 )             43.6     07-18    141  |  104  |  14  ----------------------------<  88  3.8   |  27  |  0.94    Ca    9.6      18 Jul 2022 07:25  Phos  2.5     07-18  Mg     1.7     07-18      proBNP:   Lipid Profile: Cholesterol 193  LDL --  HDL 45  TG 92    HgA1c:   TSH: Thyroid Stimulating Hormone, Serum: 3.28 uIU/mL (07-17 @ 07:44)          Assessment and plan  ---------------------------  Patient is an 89 yo woman with past medical history of HTN, HLD, Hypothyroidism. Glaucoma, osteoarthritis presents to ed after mechanical trip and fall getting out of bathroom.  pt states she lives by herself but her grand children live with her.. she woke u p this morning, went to bathroom , voided and was going back to bedroom when she may have tripped, twisted and fell on the floor and could not get up..  per report pt tested positive for covid on an in-home test recently  COVID-19 vaccine series completed w booster,  covid test positive here.. pt asymptomatic (except for chronic  ?smokers cough  pt with sig hx of htn hx sound like vasovagal syncope  continue bp meds for now  tsh/lipid  awaiting ecg, noted with sinus bradycardia  echo  dvt prophylaxis  +sig orthostatic hypotension. iv fluid, repeat   elias stocking  physical therapy  repeat orthostatic no av blocking agent  awaiting orthostatic noted with standing up bp 120, asymptomatic  elias stockings bl/ physical therapy for deconditioning  continue current meds/    	        
           CARDIOLOGY     PROGRESS  NOTE   ________________________________________________    CHIEF COMPLAINT:Patient is a 88y old  Female who presents with a chief complaint of fall at home (26 Jul 2022 13:37)  no complain.  	  REVIEW OF SYSTEMS:  CONSTITUTIONAL: No fever, weight loss, or fatigue  EYES: No eye pain, visual disturbances, or discharge  ENT:  No difficulty hearing, tinnitus, vertigo; No sinus or throat pain  NECK: No pain or stiffness  RESPIRATORY: No cough, wheezing, chills or hemoptysis; No Shortness of Breath  CARDIOVASCULAR: No chest pain, palpitations, passing out, dizziness, or leg swelling  GASTROINTESTINAL: No abdominal or epigastric pain. No nausea, vomiting, or hematemesis; No diarrhea or constipation. No melena or hematochezia.  GENITOURINARY: No dysuria, frequency, hematuria, or incontinence  NEUROLOGICAL: No headaches, memory loss, loss of strength, numbness, or tremors  SKIN: No itching, burning, rashes, or lesions   LYMPH Nodes: No enlarged glands  ENDOCRINE: No heat or cold intolerance; No hair loss  MUSCULOSKELETAL: No joint pain or swelling; No muscle, back, or extremity pain  PSYCHIATRIC: No depression, anxiety, mood swings, or difficulty sleeping  HEME/LYMPH: No easy bruising, or bleeding gums  ALLERGY AND IMMUNOLOGIC: No hives or eczema	    [ ] All others negative	  [ ] Unable to obtain    PHYSICAL EXAM:  T(C): 36.8 (07-27-22 @ 06:06), Max: 36.9 (07-26-22 @ 21:11)  HR: 70 (07-27-22 @ 06:06) (70 - 96)  BP: 159/85 (07-27-22 @ 06:06) (113/63 - 159/85)  RR: 18 (07-27-22 @ 06:06) (18 - 18)  SpO2: 96% (07-27-22 @ 06:06) (96% - 96%)  Wt(kg): --  I&O's Summary    25 Jul 2022 07:01  -  26 Jul 2022 07:00  --------------------------------------------------------  IN: 900 mL / OUT: 1400 mL / NET: -500 mL    26 Jul 2022 07:01  -  27 Jul 2022 06:25  --------------------------------------------------------  IN: 400 mL / OUT: 900 mL / NET: -500 mL        Appearance: Normal	  HEENT:   Normal oral mucosa, PERRL, EOMI	  Lymphatic: No lymphadenopathy  Cardiovascular: Normal S1 S2, No JVD, + murmurs, No edema  Respiratory:rhonchi  Psychiatry: A & O x 3, Mood & affect appropriate  Gastrointestinal:  Soft, Non-tender, + BS	  Skin: No rashes, No ecchymoses, No cyanosis	  Neurologic: Non-focal  Extremities: Normal range of motion, No clubbing, cyanosis or edema  Vascular: Peripheral pulses palpable 2+ bilaterally    MEDICATIONS  (STANDING):  amLODIPine   Tablet 10 milliGRAM(s) Oral daily  aspirin enteric coated 81 milliGRAM(s) Oral daily  brimonidine 0.2% Ophthalmic Solution 1 Drop(s) Both EYES three times a day  dorzolamide 2%/timolol 0.5% Ophthalmic Solution 1 Drop(s) Both EYES two times a day  latanoprost 0.005% Ophthalmic Solution 1 Drop(s) Both EYES at bedtime  levothyroxine 112 MICROGram(s) Oral daily  lisinopril 40 milliGRAM(s) Oral daily  meclizine 12.5 milliGRAM(s) Oral every 8 hours  multivitamin/minerals 1 Tablet(s) Oral daily  rivaroxaban 10 milliGRAM(s) Oral daily  simvastatin 10 milliGRAM(s) Oral at bedtime  sodium chloride 0.9%. 1000 milliLiter(s) (60 mL/Hr) IV Continuous <Continuous>      TELEMETRY: 	    ECG:  	  RADIOLOGY:  OTHER: 	  	  LABS:	 	    CARDIAC MARKERS:                  proBNP:   Lipid Profile: Cholesterol 193  LDL --  HDL 45  TG 92    HgA1c:   TSH: Thyroid Stimulating Hormone, Serum: 3.28 uIU/mL (07-17 @ 07:44)          Assessment and plan  ---------------------------  Patient is an 87 yo woman with past medical history of HTN, HLD, Hypothyroidism. Glaucoma, osteoarthritis presents to ed after mechanical trip and fall getting out of bathroom.  pt states she lives by herself but her grand children live with her.. she woke u p this morning, went to bathroom , voided and was going back to bedroom when she may have tripped, twisted and fell on the floor and could not get up..  per report pt tested positive for covid on an in-home test recently  COVID-19 vaccine series completed w booster,  covid test positive here.. pt asymptomatic (except for chronic  ?smokers cough  pt with sig hx of htn hx sound like vasovagal syncope  continue bp meds for now  dvt prophylaxis  elias stockings bl/ physical therapy for deconditioning  echo noted with mod pulmonary HTN, continue current meds  bp is over all controlled  AAA/ ASHD on ct, asa daily, fu AAA in 6 months  physical therapy  elias stockings  dc xarelto after total of one month post covid  orthosttaic has improved, dc ivf    	        
  afberile    REVIEW OF SYSTEMS:  GEN: no fever,    no chills  RESP: no SOB,   no cough  CVS: no chest pain,   no palpitations  GI: no abdominal pain,   no nausea,   no vomiting,   no constipation,   no diarrhea  : no dysuria,   no frequency  NEURO: no headache,   no dizziness  PSYCH: no depression,   not anxious  Derm : no rash    MEDICATIONS  (STANDING):  amLODIPine   Tablet 10 milliGRAM(s) Oral daily  aspirin enteric coated 81 milliGRAM(s) Oral daily  brimonidine 0.2% Ophthalmic Solution 1 Drop(s) Both EYES three times a day  dorzolamide 2%/timolol 0.5% Ophthalmic Solution 1 Drop(s) Both EYES two times a day  famotidine    Tablet 20 milliGRAM(s) Oral daily  latanoprost 0.005% Ophthalmic Solution 1 Drop(s) Both EYES at bedtime  levothyroxine 112 MICROGram(s) Oral daily  lisinopril 40 milliGRAM(s) Oral daily  meclizine 12.5 milliGRAM(s) Oral every 8 hours  multivitamin/minerals 1 Tablet(s) Oral daily  rivaroxaban 10 milliGRAM(s) Oral daily  simvastatin 10 milliGRAM(s) Oral at bedtime  sodium chloride 0.9%. 1000 milliLiter(s) (60 mL/Hr) IV Continuous <Continuous>    MEDICATIONS  (PRN):  acetaminophen     Tablet .. 650 milliGRAM(s) Oral every 6 hours PRN Temp greater or equal to 38C (100.4F), Mild Pain (1 - 3)  aluminum hydroxide/magnesium hydroxide/simethicone Suspension 30 milliLiter(s) Oral every 4 hours PRN Dyspepsia  melatonin 3 milliGRAM(s) Oral at bedtime PRN Insomnia  ondansetron Injectable 4 milliGRAM(s) IV Push every 8 hours PRN Nausea and/or Vomiting      Vital Signs Last 24 Hrs  T(C): 36.8 (23 Jul 2022 06:11), Max: 36.8 (23 Jul 2022 06:11)  T(F): 98.2 (23 Jul 2022 06:11), Max: 98.2 (23 Jul 2022 06:11)  HR: 73 (23 Jul 2022 06:11) (65 - 76)  BP: 139/72 (23 Jul 2022 06:11) (136/67 - 139/72)  BP(mean): --  RR: 16 (23 Jul 2022 06:11) (16 - 18)  SpO2: 95% (23 Jul 2022 06:11) (92% - 95%)    Parameters below as of 23 Jul 2022 06:11  Patient On (Oxygen Delivery Method): room air      CAPILLARY BLOOD GLUCOSE        I&O's Summary    22 Jul 2022 07:01  -  23 Jul 2022 07:00  --------------------------------------------------------  IN: 700 mL / OUT: 1200 mL / NET: -500 mL        PHYSICAL EXAM:  HEAD:  Atraumatic, Normocephalic  NECK: Supple, No   JVD  CHEST/LUNG:   no     rales,     no,    rhonchi  HEART: Regular rate and rhythm;         murmur  ABDOMEN: Soft, Nontender, ;   EXTREMITIES:     no   edema  NEUROLOGY:  alert    LABS:                        13.3   5.24  )-----------( 266      ( 23 Jul 2022 07:42 )             41.8     07-23    144  |  107  |  32<H>  ----------------------------<  95  4.1   |  24  |  0.87    Ca    10.2      23 Jul 2022 07:24    TPro  7.1  /  Alb  3.0<L>  /  TBili  0.2  /  DBili  x   /  AST  17  /  ALT  19  /  AlkPhos  53  07-23                    Thyroid Stimulating Hormone, Serum: 3.28 uIU/mL (07-17 @ 07:44)          Consultant(s) Notes Reviewed:      Care Discussed with Consultants/Other Providers:    
Patient is a 88y old  Female who presents with a chief complaint of fall at home (2022 07:24)      DATE OF SERVICE: 22 @ 12:23    SUBJECTIVE / OVERNIGHT EVENTS: overnight events noted  "I feel much better"     ROS:  Resp: No cough no sputum production  CVS: No chest pain no palpitations no orthopnea  GI: no N/V/D  : no dysuria, no hematuria  Neuro: no weakness no paresthesias  Heme: No petechiae no easy bruising  Msk: No joint pain no swelling  Skin: No rash no itching        MEDICATIONS  (STANDING):  amLODIPine   Tablet 10 milliGRAM(s) Oral daily  brimonidine 0.2% Ophthalmic Solution 1 Drop(s) Both EYES three times a day  dorzolamide 2%/timolol 0.5% Ophthalmic Solution 1 Drop(s) Both EYES two times a day  enoxaparin Injectable 40 milliGRAM(s) SubCutaneous every 24 hours  famotidine    Tablet 20 milliGRAM(s) Oral daily  latanoprost 0.005% Ophthalmic Solution 1 Drop(s) Both EYES at bedtime  levothyroxine 112 MICROGram(s) Oral daily  lisinopril 40 milliGRAM(s) Oral daily  multivitamin/minerals 1 Tablet(s) Oral daily  simvastatin 10 milliGRAM(s) Oral at bedtime  sodium chloride 0.9%. 1000 milliLiter(s) (60 mL/Hr) IV Continuous <Continuous>    MEDICATIONS  (PRN):  acetaminophen     Tablet .. 650 milliGRAM(s) Oral every 6 hours PRN Temp greater or equal to 38C (100.4F), Mild Pain (1 - 3)  aluminum hydroxide/magnesium hydroxide/simethicone Suspension 30 milliLiter(s) Oral every 4 hours PRN Dyspepsia  melatonin 3 milliGRAM(s) Oral at bedtime PRN Insomnia  ondansetron Injectable 4 milliGRAM(s) IV Push every 8 hours PRN Nausea and/or Vomiting        CAPILLARY BLOOD GLUCOSE        I&O's Summary    15 Jul 2022 07:01  -  2022 07:00  --------------------------------------------------------  IN: 1560 mL / OUT: 700 mL / NET: 860 mL        Vital Signs Last 24 Hrs  T(C): 36.4 (2022 04:30), Max: 36.7 (15 Jul 2022 20:48)  T(F): 97.6 (2022 04:30), Max: 98.1 (15 Jul 2022 20:48)  HR: 59 (2022 04:30) (59 - 82)  BP: 156/80 (2022 04:30) (127/74 - 156/80)  BP(mean): --  RR: 18 (2022 04:30) (16 - 20)  SpO2: 96% (2022 04:30) (95% - 99%)    PHYSICAL EXAM:  GENERAL: in no apparent distress  HEAD:  Atraumatic, Normocephalic  EYES: EOMI, PERRLA, sclera clear  NECK: Supple, No JVD  CHEST/LUNG: clear b/l, no wheeze  HEART: S1 S2; no murmurs appreciated  ABDOMEN: Soft, Nontender, Bowel sounds present  EXTREMITIES:  No clubbing or cyanosis,  no edema  NEUROLOGY: Alert non-focal  SKIN: No rashes or lesions    LABS:                        14.8   6.23  )-----------( 284      ( 15 Jul 2022 10:28 )             45.6     07-16    139  |  103  |  6<L>  ----------------------------<  79  3.7   |  20<L>  |  0.74    Ca    8.8      2022 07:21    TPro  7.8  /  Alb  3.7  /  TBili  0.4  /  DBili  x   /  AST  11  /  ALT  7<L>  /  AlkPhos  61  07-15    PT/INR - ( 2022 15:19 )   PT: 12.0 sec;   INR: 1.04 ratio         PTT - ( 2022 15:19 )  PTT:30.1 sec      Urinalysis Basic - ( 2022 17:31 )    Color: Light Yellow / Appearance: Clear / S.010 / pH: x  Gluc: x / Ketone: Negative  / Bili: Negative / Urobili: Negative   Blood: x / Protein: Negative / Nitrite: Negative   Leuk Esterase: Negative / RBC: 1 /hpf / WBC 0 /HPF   Sq Epi: x / Non Sq Epi: 0 /hpf / Bacteria: Negative          All consultant(s) notes reviewed and care discussed with other providers        Contact Number, Dr Ortega [phone]

## 2022-08-10 RX ORDER — LATANOPROST 0.05 MG/ML
1 SOLUTION/ DROPS OPHTHALMIC; TOPICAL
Qty: 0 | Refills: 0 | DISCHARGE

## 2022-08-10 RX ORDER — BRIMONIDINE TARTRATE 2 MG/MG
1 SOLUTION/ DROPS OPHTHALMIC
Qty: 0 | Refills: 0 | DISCHARGE

## 2022-08-10 RX ORDER — DORZOLAMIDE HYDROCHLORIDE TIMOLOL MALEATE 20; 5 MG/ML; MG/ML
1 SOLUTION/ DROPS OPHTHALMIC
Qty: 0 | Refills: 0 | DISCHARGE

## 2022-08-10 RX ORDER — LISINOPRIL 2.5 MG/1
1 TABLET ORAL
Qty: 0 | Refills: 0 | DISCHARGE

## 2022-08-10 RX ORDER — SIMVASTATIN 20 MG/1
1 TABLET, FILM COATED ORAL
Qty: 0 | Refills: 0 | DISCHARGE

## 2022-09-27 NOTE — DISCHARGE NOTE NURSING/CASE MANAGEMENT/SOCIAL WORK - HAVE YOU HAD A SECOND COVID-19 BOOSTER?
Physical Therapy Evaluation    Referred by: MARTINE Yoon; Medical Diagnosis (from order):    Diagnosis Information      Diagnosis    780.4 (ICD-9-CM) - R42 (ICD-10-CM) - Vertigo              Visit: 1      Initial Evaluation -  Daily Treatment Note  Treatment Diagnosis: impaired balance, impaired activity tolerance and increased risk for falls  Date of onset: 1 year ago  Chart reviewed at time of initial evaluation (relevant co-morbidities, allergies, tests and medications listed): Past Medical History:  No date: Bipolar 2 disorder (CMS/[facility])  No date: Depressive disorder  9/21/2022: Dysfunction of both eustachian tubes  9/21/2022: Vertigo    SUBJECTIVE                                                                                                                 Patient presents to physical therapy with reports of vertigo. Notes a history of frequent ear infections. Vertigo came on a year ago when leaning over on a counter at a gas station. Vertigo is becoming more intense since then. She experiences episodes 1-2 times a week. Lasts for about 10 seconds. It is triggered by looking up, quick movements of her head, turning over in bed, and bending over. Per medical review, patient had a positive left [place]-Hallpike with ENT. Patient notes chronic neck and low back pain, which she was seen for in physical therapy. States it is manageable.  Pain / Symptoms:  Patient denies pain/symptoms  Patient/Family/Caregiver Goals: Resolve vertigo  Prior treatment: no therapies  Discharged from hospital, home health, or skilled nursing facility in last 30 days: no    Home Environment:   Patient lives with sibling(s)  Assistance available: as needed  Feels safe at home/work/school.  2 or more falls or an unexplained fall with injury in the last year:  No       OBJECTIVE                                                                                                                        Gait     - Assistive device: no  assistive device     - Assist level: independent  Balance:  Standing Balance:   Double Leg:     Firm surface, eyes open: 30 sec    Firm surface, eyes closed: 30 sec    Tandem, left in front, eyes open: 30 sec    Tandem, left in front, eyes closed:  30 sec    Tandem, right in front, eyes open: 30    Tandem, right in front, eyes closed: 23  Single Leg:     Firm surface, left, eyes open: 30 sec    Firm surface, right, eyes open: 30 sec    Firm surface, left, eyes closed: 30 sec    Firm surface, right, eyes closed: 25 sec     Vision   Vestibular Screen  Nystagmus   - Spontaneous: absent   - Gaze evoked: absent   - Optokinetic: left normal and right normal   - Pressure-induced: absent with Closed glottis  Oculomotor/Neurological   - Smooth pursuit: normal   - Saccades: normal  Positional Exam   - Hallpike       • Left: negative  Right: negative  Asymptomatic with testing    Outcome/Assessments  Outcome Measures:   Dizziness Handicap Inventory:   DHI Total Score: 10  (scored 0-100, higher score indicates higher impairment) see flowsheet for additional documentation    TREATMENT                                                                                                                initial evaluation completed    Therapeutic Activity:  Educated patient on   Anatomy of the inner ear  Vestibular/balance system  Results of testing and diagnoses  Rationale for treatment  Home treatment    Performed left epley maneuver due to previous positive left [place]-Hallpike at ENT  Advised her to consider a decongestant as nasal and sinus congestion can lead to dizziness/vertigo as well.    Skilled input: verbal instruction/cues and tactile instruction/cues    Writer verbally educated and received verbal consent for hand placement, positioning of patient, and techniques to be performed today from patient for therapist position for techniques and hand placement and palpation for techniques as described above and how they are pertinent  to the patient's plan of care.    Home Exercise Program/Education Materials: Left and right epley         ASSESSMENT                                                                                                           26 year old female patient has signs and symptoms consistent with impaired balance, impaired activity tolerance and increased risk for falls that has reported functional limitations listed above.  Patient with history of frequent ear infections presents to physical therapy with symptoms consistent with benign paroxysmal positional vertigo. Vertigo is episodic and brought on by change of head position. Positional testing was negative today, but per medical review, she had a positive left [place]-Hallpike at ENT. Treated her with the left Epley maneuver. Will reassess next week.  Prognosis: patient will benefit from skilled therapy  Rehabilitative potential is: good  Predicted patient presentation: Low (stable) - Patient comorbidities and complexities, as defined above, will have little effect on progress for prescribed plan of care.  Patient/Caregiver Education:   Who will be receiving education: patient  Are they ready to learn: yes  Preferred learning style: written, verbal and demonstration  Barriers to learning: no barriers apparent at this time  Results of above outlined education: Verbalizes understanding and Demonstrates understanding    PLAN                                                                                                                         The following skilled interventions to be implemented to achieve goals listed below:  Gait Training (55059)  Manual Therapy (17360)  Neuromuscular Re-Education (37365)  Therapeutic Activity (42842)  Therapeutic Exercise (68492)    Frequency / Duration: 1 times per week tapering as patient progresses for an estimated total of 12 visits for 12 weeks    Patient involved in and agreed to plan of care and goals.  Patient given attendance policy  at time of initial evaluation.  Suggestions for next session as indicated: Progress per plan of care  Reassess and treat for benign paroxysmal positional vertigo  Vestibular rehab if needed        GOALS                                                                                                                           Long Term Goals: to be met by end of plan of care  1. Patient will subjectively report no symptoms of dizziness, nausea, disequilibrium with head motions and position changes.       Therapy procedure time and total treatment time can be found documented on the Time Entry flowsheet.   No

## 2023-01-18 NOTE — ED ADULT NURSE NOTE - TEMPLATE
Detail Level: Detailed Depth Of Biopsy: dermis Was A Bandage Applied: Yes Size Of Lesion In Cm: 0 Biopsy Type: H and E Biopsy Method: Dermablade Anesthesia Type: 2% lidocaine with epinephrine Anesthesia Volume In Cc (Will Not Render If 0): 0.5 Hemostasis: Drysol Wound Care: Petrolatum Dressing: bandage Destruction After The Procedure: No Type Of Destruction Used: Curettage Curettage Text: The wound bed was treated with curettage after the biopsy was performed. Cryotherapy Text: The wound bed was treated with cryotherapy after the biopsy was performed. Electrodesiccation Text: The wound bed was treated with electrodesiccation after the biopsy was performed. Electrodesiccation And Curettage Text: The wound bed was treated with electrodesiccation and curettage after the biopsy was performed. Silver Nitrate Text: The wound bed was treated with silver nitrate after the biopsy was performed. Lab: [id number] Lab Facility: [id number] Consent: Verbal consent was obtained. Post-Care Instructions: I reviewed with the patient in detail post-care instructions. Patient is to keep the biopsy site dry overnight, and then apply bacitracin twice daily until healed. Patient may apply hydrogen peroxide soaks to remove any crusting. Notification Instructions: Patient will be notified of biopsy results. However, patient instructed to call the office if not contacted within 2 weeks. Billing Type: Third-Party Bill Information: Selecting Yes will display possible errors in your note based on the variables you have selected. This validation is only offered as a suggestion for you. PLEASE NOTE THAT THE VALIDATION TEXT WILL BE REMOVED WHEN YOU FINALIZE YOUR NOTE. IF YOU WANT TO FAX A PRELIMINARY NOTE YOU WILL NEED TO TOGGLE THIS TO 'NO' IF YOU DO NOT WANT IT IN YOUR FAXED NOTE. Fall

## 2023-07-07 NOTE — H&P ADULT - ASSESSMENT
Patient is an 87 yo woman with past medical history of HTN, HLD, Hypothyroidism. Glaucoma, osteoarthritis presents to ed after mechanical trip and fall getting out of bathroom.  pt states she lives by herself but her grand children live with her.. she woke u p this morning, went to bathroom , voided and was going back to bedroom when she may have tripped, twisted and fell on the floor and could not get up..  pt denies loc denies syncope denies symptoms prior to slipping however after falling pt states she was dizzy, unable to get up for approx 20-30 minutes before ems arrived ( used 'life alert' ), pt unable to ambulates after falling due to pain in left leg  pt not dizzy at time of exam and c/o soreness to left arm and leg, states hurts when trying to walk on left leg  no fractures found  + small hematoma on Rt post scalp..  pt normally walks with bennett at home and walker outside the home     per report pt tested positive for covid on an in-home test recently   COVID-19 vaccine series completed w booster,  covid test positive here.. pt asymptomatic (except for chronic  ?smokers cough)    Yes

## 2023-11-29 NOTE — PHYSICAL THERAPY INITIAL EVALUATION ADULT - LEVEL OF INDEPENDENCE: SIT/STAND, REHAB EVAL
Include Location In Plan?: No
Detail Level: Zone
Detail Level: Generalized
minimum assist (75% patients effort)

## 2024-06-09 ENCOUNTER — INPATIENT (INPATIENT)
Facility: HOSPITAL | Age: 89
LOS: 2 days | Discharge: HOME HEALTH | End: 2024-06-12
Attending: STUDENT IN AN ORGANIZED HEALTH CARE EDUCATION/TRAINING PROGRAM | Admitting: STUDENT IN AN ORGANIZED HEALTH CARE EDUCATION/TRAINING PROGRAM
Payer: MEDICARE

## 2024-06-09 VITALS
RESPIRATION RATE: 20 BRPM | HEART RATE: 63 BPM | TEMPERATURE: 98 F | SYSTOLIC BLOOD PRESSURE: 113 MMHG | OXYGEN SATURATION: 95 % | HEIGHT: 64 IN | WEIGHT: 100.09 LBS | DIASTOLIC BLOOD PRESSURE: 76 MMHG

## 2024-06-09 DIAGNOSIS — E86.0 DEHYDRATION: ICD-10-CM

## 2024-06-09 PROBLEM — Z92.29 PERSONAL HISTORY OF OTHER DRUG THERAPY: Chronic | Status: ACTIVE | Noted: 2022-07-14

## 2024-06-09 PROBLEM — E78.5 HYPERLIPIDEMIA, UNSPECIFIED: Chronic | Status: ACTIVE | Noted: 2022-07-14

## 2024-06-09 PROBLEM — E03.9 HYPOTHYROIDISM, UNSPECIFIED: Chronic | Status: ACTIVE | Noted: 2022-07-14

## 2024-06-09 PROBLEM — I10 ESSENTIAL (PRIMARY) HYPERTENSION: Chronic | Status: ACTIVE | Noted: 2022-07-14

## 2024-06-09 LAB
ALBUMIN SERPL ELPH-MCNC: 2.8 G/DL — LOW (ref 3.3–5)
ALP SERPL-CCNC: 67 U/L — SIGNIFICANT CHANGE UP (ref 40–120)
ALT FLD-CCNC: 8 U/L — LOW (ref 12–78)
ANION GAP SERPL CALC-SCNC: 8 MMOL/L — SIGNIFICANT CHANGE UP (ref 5–17)
APPEARANCE UR: ABNORMAL
AST SERPL-CCNC: 6 U/L — LOW (ref 15–37)
BACTERIA # UR AUTO: ABNORMAL /HPF
BASOPHILS # BLD AUTO: 0.1 K/UL — SIGNIFICANT CHANGE UP (ref 0–0.2)
BASOPHILS NFR BLD AUTO: 1.7 % — SIGNIFICANT CHANGE UP (ref 0–2)
BILIRUB SERPL-MCNC: 0.4 MG/DL — SIGNIFICANT CHANGE UP (ref 0.2–1.2)
BILIRUB UR-MCNC: NEGATIVE — SIGNIFICANT CHANGE UP
BUN SERPL-MCNC: 30 MG/DL — HIGH (ref 7–23)
CALCIUM SERPL-MCNC: 10.3 MG/DL — HIGH (ref 8.5–10.1)
CHLORIDE SERPL-SCNC: 104 MMOL/L — SIGNIFICANT CHANGE UP (ref 96–108)
CO2 SERPL-SCNC: 29 MMOL/L — SIGNIFICANT CHANGE UP (ref 22–31)
COLOR SPEC: YELLOW — SIGNIFICANT CHANGE UP
CREAT SERPL-MCNC: 1.41 MG/DL — HIGH (ref 0.5–1.3)
DIFF PNL FLD: ABNORMAL
EGFR: 35 ML/MIN/1.73M2 — LOW
EOSINOPHIL # BLD AUTO: 0.27 K/UL — SIGNIFICANT CHANGE UP (ref 0–0.5)
EOSINOPHIL NFR BLD AUTO: 4.7 % — SIGNIFICANT CHANGE UP (ref 0–6)
EPI CELLS # UR: PRESENT
GLUCOSE SERPL-MCNC: 115 MG/DL — HIGH (ref 70–99)
GLUCOSE UR QL: NEGATIVE MG/DL — SIGNIFICANT CHANGE UP
HCT VFR BLD CALC: 41.2 % — SIGNIFICANT CHANGE UP (ref 34.5–45)
HGB BLD-MCNC: 13.1 G/DL — SIGNIFICANT CHANGE UP (ref 11.5–15.5)
IMM GRANULOCYTES NFR BLD AUTO: 0.5 % — SIGNIFICANT CHANGE UP (ref 0–0.9)
KETONES UR-MCNC: ABNORMAL MG/DL
LACTATE SERPL-SCNC: 1.3 MMOL/L — SIGNIFICANT CHANGE UP (ref 0.7–2)
LEUKOCYTE ESTERASE UR-ACNC: ABNORMAL
LYMPHOCYTES # BLD AUTO: 2.11 K/UL — SIGNIFICANT CHANGE UP (ref 1–3.3)
LYMPHOCYTES # BLD AUTO: 36.7 % — SIGNIFICANT CHANGE UP (ref 13–44)
MCHC RBC-ENTMCNC: 26.6 PG — LOW (ref 27–34)
MCHC RBC-ENTMCNC: 31.8 G/DL — LOW (ref 32–36)
MCV RBC AUTO: 83.6 FL — SIGNIFICANT CHANGE UP (ref 80–100)
MONOCYTES # BLD AUTO: 0.87 K/UL — SIGNIFICANT CHANGE UP (ref 0–0.9)
MONOCYTES NFR BLD AUTO: 15.1 % — HIGH (ref 2–14)
NEUTROPHILS # BLD AUTO: 2.37 K/UL — SIGNIFICANT CHANGE UP (ref 1.8–7.4)
NEUTROPHILS NFR BLD AUTO: 41.3 % — LOW (ref 43–77)
NITRITE UR-MCNC: NEGATIVE — SIGNIFICANT CHANGE UP
NRBC # BLD: 0 /100 WBCS — SIGNIFICANT CHANGE UP (ref 0–0)
PH UR: 7 — SIGNIFICANT CHANGE UP (ref 5–8)
PLATELET # BLD AUTO: 274 K/UL — SIGNIFICANT CHANGE UP (ref 150–400)
POTASSIUM SERPL-MCNC: 3.5 MMOL/L — SIGNIFICANT CHANGE UP (ref 3.5–5.3)
POTASSIUM SERPL-SCNC: 3.5 MMOL/L — SIGNIFICANT CHANGE UP (ref 3.5–5.3)
PROT SERPL-MCNC: 7.3 GM/DL — SIGNIFICANT CHANGE UP (ref 6–8.3)
PROT UR-MCNC: 100 MG/DL
RBC # BLD: 4.93 M/UL — SIGNIFICANT CHANGE UP (ref 3.8–5.2)
RBC # FLD: 13.8 % — SIGNIFICANT CHANGE UP (ref 10.3–14.5)
RBC CASTS # UR COMP ASSIST: 40 /HPF — HIGH (ref 0–4)
SODIUM SERPL-SCNC: 141 MMOL/L — SIGNIFICANT CHANGE UP (ref 135–145)
SP GR SPEC: 1.01 — SIGNIFICANT CHANGE UP (ref 1–1.03)
UROBILINOGEN FLD QL: 1 MG/DL — SIGNIFICANT CHANGE UP (ref 0.2–1)
WBC # BLD: 5.75 K/UL — SIGNIFICANT CHANGE UP (ref 3.8–10.5)
WBC # FLD AUTO: 5.75 K/UL — SIGNIFICANT CHANGE UP (ref 3.8–10.5)
WBC UR QL: ABNORMAL /HPF (ref 0–5)

## 2024-06-09 PROCEDURE — 71045 X-RAY EXAM CHEST 1 VIEW: CPT | Mod: 26

## 2024-06-09 PROCEDURE — 70450 CT HEAD/BRAIN W/O DYE: CPT | Mod: 26,MC

## 2024-06-09 PROCEDURE — 93010 ELECTROCARDIOGRAM REPORT: CPT

## 2024-06-09 PROCEDURE — 99222 1ST HOSP IP/OBS MODERATE 55: CPT

## 2024-06-09 PROCEDURE — 99285 EMERGENCY DEPT VISIT HI MDM: CPT

## 2024-06-09 RX ORDER — IBUPROFEN 200 MG
1 TABLET ORAL
Qty: 40 | Refills: 0
Start: 2024-06-09 | End: 2024-06-18

## 2024-06-09 RX ORDER — SODIUM CHLORIDE 9 MG/ML
1000 INJECTION, SOLUTION INTRAVENOUS
Refills: 0 | Status: DISCONTINUED | OUTPATIENT
Start: 2024-06-09 | End: 2024-06-09

## 2024-06-09 RX ORDER — ONDANSETRON 8 MG/1
4 TABLET, FILM COATED ORAL EVERY 8 HOURS
Refills: 0 | Status: DISCONTINUED | OUTPATIENT
Start: 2024-06-09 | End: 2024-06-12

## 2024-06-09 RX ORDER — ACETAMINOPHEN 500 MG
2 TABLET ORAL
Qty: 60 | Refills: 0
Start: 2024-06-09 | End: 2024-06-18

## 2024-06-09 RX ORDER — TRAMADOL HYDROCHLORIDE 50 MG/1
1 TABLET ORAL
Qty: 9 | Refills: 0
Start: 2024-06-09 | End: 2024-06-11

## 2024-06-09 RX ORDER — LANOLIN ALCOHOL/MO/W.PET/CERES
3 CREAM (GRAM) TOPICAL AT BEDTIME
Refills: 0 | Status: DISCONTINUED | OUTPATIENT
Start: 2024-06-09 | End: 2024-06-12

## 2024-06-09 RX ORDER — ACETAMINOPHEN 500 MG
675 TABLET ORAL ONCE
Refills: 0 | Status: COMPLETED | OUTPATIENT
Start: 2024-06-09 | End: 2024-06-09

## 2024-06-09 RX ORDER — ACETAMINOPHEN 500 MG
650 TABLET ORAL EVERY 6 HOURS
Refills: 0 | Status: DISCONTINUED | OUTPATIENT
Start: 2024-06-09 | End: 2024-06-12

## 2024-06-09 RX ORDER — SODIUM CHLORIDE 9 MG/ML
1000 INJECTION INTRAMUSCULAR; INTRAVENOUS; SUBCUTANEOUS ONCE
Refills: 0 | Status: COMPLETED | OUTPATIENT
Start: 2024-06-09 | End: 2024-06-09

## 2024-06-09 RX ADMIN — Medication 675 MILLIGRAM(S): at 16:01

## 2024-06-09 RX ADMIN — SODIUM CHLORIDE 1000 MILLILITER(S): 9 INJECTION INTRAMUSCULAR; INTRAVENOUS; SUBCUTANEOUS at 13:39

## 2024-06-09 RX ADMIN — Medication 270 MILLIGRAM(S): at 15:37

## 2024-06-09 NOTE — ED PROVIDER NOTE - CONSTITUTIONAL APPEARANCE HYGIENE, MLM
generally weak appearing, nontoxic/nonlethargic appearing, pt is in no respiratory distress and able to follow commands without difficulty

## 2024-06-09 NOTE — PATIENT PROFILE ADULT - FALL HARM RISK - RISK INTERVENTIONS
Assistance OOB with selected safe patient handling equipment/Assistance with ambulation/Communicate Fall Risk and Risk Factors to all staff, patient, and family/Discuss with provider need for PT consult/Monitor gait and stability/Reinforce activity limits and safety measures with patient and family/Visual Cue: Yellow wristband/Bed in lowest position, wheels locked, appropriate side rails in place/Call bell, personal items and telephone in reach/Instruct patient to call for assistance before getting out of bed or chair/Non-slip footwear when patient is out of bed/[place] to call system/Physically safe environment - no spills, clutter or unnecessary equipment/Purposeful Proactive Rounding/Room/bathroom lighting operational, light cord in reach

## 2024-06-09 NOTE — PATIENT PROFILE ADULT - FUNCTIONAL ASSESSMENT - BASIC MOBILITY 4.
Chief Complaint: Respiratory failure    Interval Events: No events overnight.    Review of Systems:  Unable to obtain    Physical Exam:  Vital Signs Last 24 Hrs  T(C): 37 (15 David 2023 05:00), Max: 37 (15 David 2023 05:00)  T(F): 98.6 (15 David 2023 05:00), Max: 98.6 (15 David 2023 05:00)  HR: 88 (15 David 2023 09:00) (79 - 100)  BP: 129/81 (15 David 2023 09:00) (113/72 - 134/92)  BP(mean): 96 (15 David 2023 09:00) (85 - 103)  RR: 16 (15 David 2023 09:00) (15 - 25)  SpO2: 97% (15 David 2023 09:00) (95% - 100%)  Parameters below as of 15 David 2023 06:00  Patient On (Oxygen Delivery Method): ventilator  O2 Concentration (%): 30  General: Sedated  HEENT: Intubated  Neck: No JVD, no carotid bruit  Lungs: CTAB  CV: RRR, nl S1/S2, no M/R/G  Abdomen: S/NT/ND, +BS  Extremities: No LE edema, no cyanosis  Neuro: AAOx0  Skin: No rash    Labs:    01-14    142  |  103  |  144<H>  ----------------------------<  111<H>  4.9   |  18<L>  |  4.69<H>    Ca    8.7      14 Jan 2023 06:00  Phos  8.1     01-14  Mg     3.0     01-14                          10.8   11.05 )-----------( 221      ( 14 Jan 2023 06:00 )             34.5         ECG/Telemetry: Sinus rhythm 2 = A lot of assistance

## 2024-06-09 NOTE — PATIENT PROFILE ADULT - VISION (WITH CORRECTIVE LENSES IF THE PATIENT USUALLY WEARS THEM):
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

## 2024-06-09 NOTE — ED ADULT TRIAGE NOTE - CHIEF COMPLAINT QUOTE
Pt biba from home with c/o not eating or drinking x 2 weeks  h/o: hld, hypothyroid, L eye blindness, htn

## 2024-06-09 NOTE — ED ADULT NURSE NOTE - NSFALLHARMRISKINTERV_ED_ALL_ED

## 2024-06-09 NOTE — ED PROVIDER NOTE - WR ORDER NAME 1
Pako Hamilton was seen and treated in our emergency department on 5/8/2022. She may return to work on 05/10/2022. If you have any questions or concerns, please don't hesitate to call.       [facility] Xray Chest 1 View- PORTABLE-Urgent

## 2024-06-09 NOTE — ED PROVIDER NOTE - MDM ORDERS SUBMITTED SELECTION
Spoke with Dr. Fraga presenting pt clinicals. MD advised pt does not meet criteria for admission and okay to discharge. Careful discussion with patient about discharge. No objective or reported signs of SI or acute distress, or self harm. Advised if change of condition or worsening of symptoms return to ED and/or seek outpt services.    Imaging Studies/Medications

## 2024-06-09 NOTE — ED PROVIDER NOTE - CLINICAL SUMMARY MEDICAL DECISION MAKING FREE TEXT BOX
89 year old female with h/o HTN, HLD and hypothyroidism presents today biba accompanied with her grand daughter who reports pt has not been eating or drinking for over a month.  At baseline pt is bedbound for the last three years, pt did fall at home three years ago, was in rehab but slowly declined to where she has been bedbound, her grand daughter states that food and liquids can be placed at her bedside and will be the same way the whole day, she may take a bite or a sip but nothing more, pt states "I'm just not hungry", at times pt does c/o feeling nauseous, but she denies abd pain, chest pain, vomiting, diarrhea, headaches or dizziness. On exam pt is awake, alert and oriented to person and place only, in no respiratory distress, flat affect ?depressed apperaing, she is able to follow simple commands and responds appropriately to my questions.  Her exam is otherwise normal.  Differential diagnosis includes but not limited to failure to thrive, uti, viral illness, depression.  Labs ordered, IVF hydration, cxr and EKG, will reassess and dispo 89 year old female with h/o HTN, HLD and hypothyroidism presents today biba accompanied with her grand daughter who reports pt has not been eating or drinking for over a month.  At baseline pt is bedbound for the last three years, pt did fall at home three years ago, was in rehab but slowly declined to where she has been bedbound, her grand daughter states that food and liquids can be placed at her bedside and will be the same way the whole day, she may take a bite or a sip but nothing more, pt states "I'm just not hungry", at times pt does c/o feeling nauseous, but she denies abd pain, chest pain, vomiting, diarrhea, headaches or dizziness. On exam pt is awake, alert and oriented to person and place only, in no respiratory distress, flat affect ?depressed apperaing, she is able to follow simple commands and responds appropriately to my questions.  Her exam is otherwise normal.  Differential diagnosis includes but not limited to failure to thrive, uti, viral illness, depression.  Labs ordered, IVF hydration, cxr and EKG, will reassess and dispo    labs reviewed, cxr negative, ivf give  pt admitted for failure to thrive

## 2024-06-10 LAB
ANION GAP SERPL CALC-SCNC: 7 MMOL/L — SIGNIFICANT CHANGE UP (ref 5–17)
BUN SERPL-MCNC: 24 MG/DL — HIGH (ref 7–23)
CALCIUM SERPL-MCNC: 9.7 MG/DL — SIGNIFICANT CHANGE UP (ref 8.5–10.1)
CHLORIDE SERPL-SCNC: 109 MMOL/L — HIGH (ref 96–108)
CO2 SERPL-SCNC: 26 MMOL/L — SIGNIFICANT CHANGE UP (ref 22–31)
CREAT SERPL-MCNC: 0.97 MG/DL — SIGNIFICANT CHANGE UP (ref 0.5–1.3)
EGFR: 56 ML/MIN/1.73M2 — LOW
GLUCOSE SERPL-MCNC: 78 MG/DL — SIGNIFICANT CHANGE UP (ref 70–99)
HCT VFR BLD CALC: 39.3 % — SIGNIFICANT CHANGE UP (ref 34.5–45)
HGB BLD-MCNC: 12.7 G/DL — SIGNIFICANT CHANGE UP (ref 11.5–15.5)
MCHC RBC-ENTMCNC: 27.1 PG — SIGNIFICANT CHANGE UP (ref 27–34)
MCHC RBC-ENTMCNC: 32.3 G/DL — SIGNIFICANT CHANGE UP (ref 32–36)
MCV RBC AUTO: 83.8 FL — SIGNIFICANT CHANGE UP (ref 80–100)
NRBC # BLD: 0 /100 WBCS — SIGNIFICANT CHANGE UP (ref 0–0)
PLATELET # BLD AUTO: 250 K/UL — SIGNIFICANT CHANGE UP (ref 150–400)
POTASSIUM SERPL-MCNC: 3.2 MMOL/L — LOW (ref 3.5–5.3)
POTASSIUM SERPL-SCNC: 3.2 MMOL/L — LOW (ref 3.5–5.3)
RBC # BLD: 4.69 M/UL — SIGNIFICANT CHANGE UP (ref 3.8–5.2)
RBC # FLD: 13.9 % — SIGNIFICANT CHANGE UP (ref 10.3–14.5)
SODIUM SERPL-SCNC: 142 MMOL/L — SIGNIFICANT CHANGE UP (ref 135–145)
WBC # BLD: 5.75 K/UL — SIGNIFICANT CHANGE UP (ref 3.8–10.5)
WBC # FLD AUTO: 5.75 K/UL — SIGNIFICANT CHANGE UP (ref 3.8–10.5)

## 2024-06-10 PROCEDURE — 99232 SBSQ HOSP IP/OBS MODERATE 35: CPT

## 2024-06-10 RX ORDER — SIMVASTATIN 20 MG/1
10 TABLET, FILM COATED ORAL AT BEDTIME
Refills: 0 | Status: DISCONTINUED | OUTPATIENT
Start: 2024-06-10 | End: 2024-06-12

## 2024-06-10 RX ORDER — CEFTRIAXONE 500 MG/1
1000 INJECTION, POWDER, FOR SOLUTION INTRAMUSCULAR; INTRAVENOUS EVERY 24 HOURS
Refills: 0 | Status: COMPLETED | OUTPATIENT
Start: 2024-06-10 | End: 2024-06-12

## 2024-06-10 RX ORDER — ASPIRIN/CALCIUM CARB/MAGNESIUM 324 MG
81 TABLET ORAL DAILY
Refills: 0 | Status: DISCONTINUED | OUTPATIENT
Start: 2024-06-10 | End: 2024-06-12

## 2024-06-10 RX ORDER — POTASSIUM CHLORIDE 20 MEQ
40 PACKET (EA) ORAL ONCE
Refills: 0 | Status: COMPLETED | OUTPATIENT
Start: 2024-06-10 | End: 2024-06-10

## 2024-06-10 RX ORDER — HEPARIN SODIUM 5000 [USP'U]/ML
5000 INJECTION INTRAVENOUS; SUBCUTANEOUS EVERY 8 HOURS
Refills: 0 | Status: DISCONTINUED | OUTPATIENT
Start: 2024-06-10 | End: 2024-06-12

## 2024-06-10 RX ORDER — LEVOTHYROXINE SODIUM 125 MCG
112 TABLET ORAL DAILY
Refills: 0 | Status: DISCONTINUED | OUTPATIENT
Start: 2024-06-10 | End: 2024-06-12

## 2024-06-10 RX ORDER — SODIUM CHLORIDE 9 MG/ML
1000 INJECTION, SOLUTION INTRAVENOUS
Refills: 0 | Status: DISCONTINUED | OUTPATIENT
Start: 2024-06-10 | End: 2024-06-11

## 2024-06-10 RX ORDER — MIRTAZAPINE 45 MG/1
7.5 TABLET, ORALLY DISINTEGRATING ORAL DAILY
Refills: 0 | Status: DISCONTINUED | OUTPATIENT
Start: 2024-06-10 | End: 2024-06-12

## 2024-06-10 RX ADMIN — Medication 40 MILLIEQUIVALENT(S): at 10:04

## 2024-06-10 RX ADMIN — HEPARIN SODIUM 5000 UNIT(S): 5000 INJECTION INTRAVENOUS; SUBCUTANEOUS at 05:47

## 2024-06-10 RX ADMIN — MIRTAZAPINE 7.5 MILLIGRAM(S): 45 TABLET, ORALLY DISINTEGRATING ORAL at 15:30

## 2024-06-10 RX ADMIN — SODIUM CHLORIDE 60 MILLILITER(S): 9 INJECTION, SOLUTION INTRAVENOUS at 14:50

## 2024-06-10 RX ADMIN — SIMVASTATIN 10 MILLIGRAM(S): 20 TABLET, FILM COATED ORAL at 21:28

## 2024-06-10 RX ADMIN — HEPARIN SODIUM 5000 UNIT(S): 5000 INJECTION INTRAVENOUS; SUBCUTANEOUS at 15:30

## 2024-06-10 RX ADMIN — Medication 112 MICROGRAM(S): at 05:48

## 2024-06-10 RX ADMIN — Medication 81 MILLIGRAM(S): at 15:30

## 2024-06-10 RX ADMIN — CEFTRIAXONE 100 MILLIGRAM(S): 500 INJECTION, POWDER, FOR SOLUTION INTRAMUSCULAR; INTRAVENOUS at 05:47

## 2024-06-10 RX ADMIN — HEPARIN SODIUM 5000 UNIT(S): 5000 INJECTION INTRAVENOUS; SUBCUTANEOUS at 21:28

## 2024-06-10 NOTE — PHYSICAL THERAPY INITIAL EVALUATION ADULT - NSPTDISCHREC_GEN_A_CORE
[facility]ata in agreement for [facility] -Informed that previous [facility] experience was not helpful but  wants to try again to improve bed mobility/Sub-acute Rehab

## 2024-06-10 NOTE — H&P ADULT - HISTORY OF PRESENT ILLNESS
89F w/ hx of HTN, HLD, mild cognitive decline p/w decreased PO intake. Pt granddaughter endorses progressively worsening appetite over the past several months. This is associated with decreased physical activity- pt now bedbound, and general lack of interest.  89F w/ hx of HTN, HLD, mild cognitive decline p/w decreased PO intake. Pt granddaughter endorses progressively worsening appetite over the past several months. This is associated with decreased physical activity- pt now bedbound, and general lack of interest.     In ED, pt afebrile and HDS. Labs notable for SCr 1.41, UA +

## 2024-06-10 NOTE — H&P ADULT - NSHPPHYSICALEXAM_GEN_ALL_CORE
T(C): 36.1 (06-09-24 @ 22:21), Max: 36.5 (06-09-24 @ 11:59)  HR: 60 (06-09-24 @ 22:21) (59 - 64)  BP: 127/71 (06-09-24 @ 22:21) (112/65 - 148/89)  RR: 18 (06-09-24 @ 22:21) (14 - 20)  SpO2: 95% (06-09-24 @ 22:21) (95% - 100%)    CONSTITUTIONAL: Well groomed, no apparent distress  EYES: PERRLA and symmetric, EOMI, No conjunctival or scleral injection, non-icteric  ENMT: Dry MM  RESP: No respiratory distress, no use of accessory muscles; CTA b/l, no WRR  CV: RRR, +S1S2, no MRG; no JVD; no peripheral edema  GI: Soft, NT, ND, no rebound, no guarding; no palpable masses; no hepatosplenomegaly; no hernia palpated  LYMPH: No cervical LAD or tenderness; no axillary LAD or tenderness; no inguinal LAD or tenderness  MSK: Normal gait; No digital clubbing or cyanosis; examination of the (head/neck/spine/ribs/pelvis, RUE, LUE, RLE, LLE) without misalignment,            Normal ROM without pain, no spinal tenderness, normal muscle strength/tone  SKIN: No rashes or ulcers noted; no subcutaneous nodules or induration palpable  NEURO: AOx2  PSYCH: Appropriate insight/judgment; A+O x 3, mood and affect appropriate, recent/remote memory intact

## 2024-06-10 NOTE — H&P ADULT - ASSESSMENT
89F w/ hx of HTN, HLD, mild cognitive decline p/w decreased PO intake. Found to have ROSANA and UTI    #Failure to thrive  #UTI  - CTX  - IV LR  - follow up UCx    #ROSANA  - IV LR  - hold lisinopril  - renally dose meds  - avoid nephrotoxic agents    #FEN/PPX  - regular diet  - SQH for DVT ppx 89F w/ hx of HTN, HLD, mild cognitive decline p/w decreased PO intake. Found to have ROSAAN and UTI    #Failure to thrive  #UTI  - CTX  - IV LR  - start mirtazipine  - follow up UCx    #ROSANA  - IV LR  - hold lisinopril  - renally dose meds  - avoid nephrotoxic agents    #FEN/PPX  - regular diet  - SQH for DVT ppx

## 2024-06-10 NOTE — H&P ADULT - NSHPLABSRESULTS_GEN_ALL_CORE
13.1   5.75  )-----------( 274      ( 2024 12:48 )             41.2         141  |  104  |  30<H>  ----------------------------<  115<H>  3.5   |  29  |  1.41<H>    Ca    10.3<H>      2024 12:48    TPro  7.3  /  Alb  2.8<L>  /  TBili  0.4  /  DBili  x   /  AST  6<L>  /  ALT  8<L>  /  AlkPhos  67        Urinalysis Basic - ( 2024 20:02 )    Color: Yellow / Appearance: Cloudy / S.013 / pH: x  Gluc: x / Ketone: Trace mg/dL  / Bili: Negative / Urobili: 1.0 mg/dL   Blood: x / Protein: 100 mg/dL / Nitrite: Negative   Leuk Esterase: Large / RBC: 40 /HPF / WBC Too Numerous to count /HPF   Sq Epi: x / Non Sq Epi: x / Bacteria: Many /HPF

## 2024-06-10 NOTE — PHYSICAL THERAPY INITIAL EVALUATION ADULT - GENERAL OBSERVATIONS, REHAB EVAL
Pt recd supine nad. +scalp alopecia. Alert, able to f/u 1 step commands. + forgetfulness. C/o pain in L knee and L shoulder - unable to grade

## 2024-06-10 NOTE — PHYSICAL THERAPY INITIAL EVALUATION ADULT - LEVEL OF INDEPENDENCE: SIT/SUPINE, REHAB EVAL
Able to sit at EOB fro approx 12 min. the first 5 min  needed mod- max A for static sitt , for the remaining 7 min tolerated unsupported static sitt with contact guard assist ./maximum assist (25% patients effort)

## 2024-06-10 NOTE — PHYSICAL THERAPY INITIAL EVALUATION ADULT - PERTINENT HX OF CURRENT PROBLEM, REHAB EVAL
Admitted for FTT  case d/w grand dtr Cata (Emergency contact) over the phone.  [place] that pt has been bed bound for  2 years.

## 2024-06-11 ENCOUNTER — TRANSCRIPTION ENCOUNTER (OUTPATIENT)
Age: 89
End: 2024-06-11

## 2024-06-11 LAB
ANION GAP SERPL CALC-SCNC: 5 MMOL/L — SIGNIFICANT CHANGE UP (ref 5–17)
BUN SERPL-MCNC: 16 MG/DL — SIGNIFICANT CHANGE UP (ref 7–23)
CALCIUM SERPL-MCNC: 9.5 MG/DL — SIGNIFICANT CHANGE UP (ref 8.5–10.1)
CHLORIDE SERPL-SCNC: 112 MMOL/L — HIGH (ref 96–108)
CO2 SERPL-SCNC: 27 MMOL/L — SIGNIFICANT CHANGE UP (ref 22–31)
CREAT SERPL-MCNC: 0.89 MG/DL — SIGNIFICANT CHANGE UP (ref 0.5–1.3)
EGFR: 62 ML/MIN/1.73M2 — SIGNIFICANT CHANGE UP
GLUCOSE SERPL-MCNC: 106 MG/DL — HIGH (ref 70–99)
MAGNESIUM SERPL-MCNC: 1.8 MG/DL — SIGNIFICANT CHANGE UP (ref 1.6–2.6)
PHOSPHATE SERPL-MCNC: 1.9 MG/DL — LOW (ref 2.5–4.5)
POTASSIUM SERPL-MCNC: 3.5 MMOL/L — SIGNIFICANT CHANGE UP (ref 3.5–5.3)
POTASSIUM SERPL-SCNC: 3.5 MMOL/L — SIGNIFICANT CHANGE UP (ref 3.5–5.3)
SODIUM SERPL-SCNC: 144 MMOL/L — SIGNIFICANT CHANGE UP (ref 135–145)
TSH SERPL-MCNC: 0.36 UIU/ML — LOW (ref 0.36–3.74)

## 2024-06-11 PROCEDURE — 99232 SBSQ HOSP IP/OBS MODERATE 35: CPT

## 2024-06-11 RX ORDER — POTASSIUM CHLORIDE 20 MEQ
40 PACKET (EA) ORAL ONCE
Refills: 0 | Status: COMPLETED | OUTPATIENT
Start: 2024-06-11 | End: 2024-06-11

## 2024-06-11 RX ORDER — SODIUM,POTASSIUM PHOSPHATES 278-250MG
1 POWDER IN PACKET (EA) ORAL
Refills: 0 | Status: COMPLETED | OUTPATIENT
Start: 2024-06-11 | End: 2024-06-11

## 2024-06-11 RX ORDER — AMLODIPINE BESYLATE 2.5 MG/1
2.5 TABLET ORAL DAILY
Refills: 0 | Status: DISCONTINUED | OUTPATIENT
Start: 2024-06-11 | End: 2024-06-12

## 2024-06-11 RX ADMIN — HEPARIN SODIUM 5000 UNIT(S): 5000 INJECTION INTRAVENOUS; SUBCUTANEOUS at 14:56

## 2024-06-11 RX ADMIN — SODIUM CHLORIDE 60 MILLILITER(S): 9 INJECTION, SOLUTION INTRAVENOUS at 05:09

## 2024-06-11 RX ADMIN — MIRTAZAPINE 7.5 MILLIGRAM(S): 45 TABLET, ORALLY DISINTEGRATING ORAL at 11:44

## 2024-06-11 RX ADMIN — AMLODIPINE BESYLATE 2.5 MILLIGRAM(S): 2.5 TABLET ORAL at 09:10

## 2024-06-11 RX ADMIN — HEPARIN SODIUM 5000 UNIT(S): 5000 INJECTION INTRAVENOUS; SUBCUTANEOUS at 05:09

## 2024-06-11 RX ADMIN — Medication 81 MILLIGRAM(S): at 11:44

## 2024-06-11 RX ADMIN — SIMVASTATIN 10 MILLIGRAM(S): 20 TABLET, FILM COATED ORAL at 21:34

## 2024-06-11 RX ADMIN — Medication 1 PACKET(S): at 17:14

## 2024-06-11 RX ADMIN — Medication 40 MILLIEQUIVALENT(S): at 10:04

## 2024-06-11 RX ADMIN — Medication 1 PACKET(S): at 09:10

## 2024-06-11 RX ADMIN — Medication 112 MICROGRAM(S): at 05:09

## 2024-06-11 RX ADMIN — HEPARIN SODIUM 5000 UNIT(S): 5000 INJECTION INTRAVENOUS; SUBCUTANEOUS at 21:34

## 2024-06-11 RX ADMIN — SODIUM CHLORIDE 60 MILLILITER(S): 9 INJECTION, SOLUTION INTRAVENOUS at 09:10

## 2024-06-11 RX ADMIN — CEFTRIAXONE 100 MILLIGRAM(S): 500 INJECTION, POWDER, FOR SOLUTION INTRAMUSCULAR; INTRAVENOUS at 05:09

## 2024-06-11 NOTE — DISCHARGE NOTE PROVIDER - HOSPITAL COURSE
89F w/ hx of HTN, HLD, mild cognitive decline p/w decreased PO intake. Found to have ROSANA and UTI now resolved. Patient is medically stable for discharge home.    #Failure to thrive  #UTI  - reports decrease PO intake for past year after the fall. Denies nausea  - Denies hx of malignancy  - CT Head negative  - CXR negative for consolidation  - UA positive for LE  - C/w Rocephin  - C/w mirtazipine  - follow up UCx  - encourage PO intake  - PT eval: [facility]    #ROSANA, improved  - likely from decrease PO intake, on ACEi  - s/p IVF  - hold lisinopril  - avoid nephrotoxic agents    #HTN  - currently normotensive  - Hold ACE  - restart on amlodipine if hypertensive    #HLD  - C/w statin    #Hypothyroidism  - C/w levothyroxine 112mcg        #FEN/PPX  - regular diet  - SQH for DVT ppx      Dispo: PT recommend [facility]. Family would like pt to go home  
No

## 2024-06-11 NOTE — DIETITIAN INITIAL EVALUATION ADULT - PERTINENT MEDS FT
MEDICATIONS  (STANDING):  amLODIPine   Tablet 2.5 milliGRAM(s) Oral daily  aspirin  chewable 81 milliGRAM(s) Oral daily  cefTRIAXone   IVPB 1000 milliGRAM(s) IV Intermittent every 24 hours  heparin   Injectable 5000 Unit(s) SubCutaneous every 8 hours  levothyroxine 112 MICROGram(s) Oral daily  mirtazapine 7.5 milliGRAM(s) Oral daily  potassium phosphate / sodium phosphate Powder (PHOS-NaK) 1 Packet(s) Oral two times a day  simvastatin 10 milliGRAM(s) Oral at bedtime    MEDICATIONS  (PRN):  acetaminophen     Tablet .. 650 milliGRAM(s) Oral every 6 hours PRN Temp greater or equal to 38C (100.4F), Mild Pain (1 - 3)  aluminum hydroxide/magnesium hydroxide/simethicone Suspension 30 milliLiter(s) Oral every 4 hours PRN Dyspepsia  melatonin 3 milliGRAM(s) Oral at bedtime PRN Insomnia  ondansetron Injectable 4 milliGRAM(s) IV Push every 8 hours PRN Nausea and/or Vomiting

## 2024-06-11 NOTE — DISCHARGE NOTE PROVIDER - NSDCMRMEDTOKEN_GEN_ALL_CORE_FT
acetaminophen 325 mg oral tablet: 2 tab(s) orally every 8 hours  acetaminophen 325 mg oral tablet: 2 tab(s) orally every 6 hours, As needed, Temp greater or equal to 38C (100.4F), Mild Pain (1 - 3)  amLODIPine 10 mg oral tablet: 1 tab(s) orally once a day  brimonidine 0.2% ophthalmic solution: 1 drop(s) in each eye 3 times a day  dorzolamide-timolol 2.23%-0.68% ophthalmic solution: 1 drop(s) in each eye 2 times a day  latanoprost 0.005% ophthalmic solution: 1 drop(s) in each eye once a day (in the evening)  levothyroxine 112 mcg (0.112 mg) oral tablet: 1 tab(s) orally once a day  lisinopril 40 mg oral tablet: 1 tab(s) orally once a day  simvastatin 10 mg oral tablet: 1 tab(s) orally once a day (at bedtime)   acetaminophen 325 mg oral tablet: 2 tab(s) orally every 6 hours As needed Temp greater or equal to 38C (100.4F), Mild Pain (1 - 3)  amLODIPine 2.5 mg oral tablet: 1 tab(s) orally once a day  aspirin 81 mg oral tablet, chewable: 1 tab(s) orally once a day  brimonidine 0.2% ophthalmic solution: 1 drop(s) in each eye 3 times a day  dorzolamide-timolol 2.23%-0.68% ophthalmic solution: 1 drop(s) in each eye 2 times a day  latanoprost 0.005% ophthalmic solution: 1 drop(s) in each eye once a day (in the evening)  levothyroxine 112 mcg (0.112 mg) oral tablet: 1 tab(s) orally once a day  lisinopril 40 mg oral tablet: 1 tab(s) orally once a day  mirtazapine 7.5 mg oral tablet: 1 tab(s) orally once a day  simvastatin 10 mg oral tablet: 1 tab(s) orally once a day (at bedtime)

## 2024-06-11 NOTE — DISCHARGE NOTE PROVIDER - NSDCCPCAREPLAN_GEN_ALL_CORE_FT
PRINCIPAL DISCHARGE DIAGNOSIS  Diagnosis: Failure to thrive in adult  Assessment and Plan of Treatment: Continue diet as tolerated      SECONDARY DISCHARGE DIAGNOSES  Diagnosis: Acute UTI  Assessment and Plan of Treatment:

## 2024-06-11 NOTE — DIETITIAN INITIAL EVALUATION ADULT - PERTINENT LABORATORY DATA
06-11    144  |  112<H>  |  16  ----------------------------<  106<H>  3.5   |  27  |  0.89    Ca    9.5      11 Jun 2024 06:25  Phos  1.9     06-11  Mg     1.8     06-11

## 2024-06-11 NOTE — DISCHARGE NOTE PROVIDER - ATTENDING DISCHARGE PHYSICAL EXAMINATION:
GENERAL: NAD,  no increased WOB  HEAD:  Atraumatic, Normocephalic  EYES: EOMI, conjunctiva and sclera clear  ENMT: Moist mucous membranes  NECK: Supple, No JVD  NERVOUS SYSTEM:  Alert   CHEST/LUNG: Clear to auscultation bilaterally; No rales, rhonchi, wheezing  HEART: Regular rate and rhythm  ABDOMEN: Soft, Nontender, Nondistended; Bowel sounds present  EXTREMITIES:  No clubbing, cyanosis, calf tenderness or edema b/l

## 2024-06-11 NOTE — PROGRESS NOTE ADULT - ASSESSMENT
89F w/ hx of HTN, HLD, mild cognitive decline p/w decreased PO intake. Found to have ROSANA and UTI    #Failure to thrive  #UTI  - reports decrease PO intake for past year after the fall. Denies nausea  - Denies hx of malignancy  - CT Head negative  - CXR negative for consolidation  - UA positive for LE  - C/w Rocephin  - C/w mirtazipine  - follow up UCx  - encourage PO intake  - PT eval    #ROSANA, improved  - likely from decrease PO intake, on ACEi  - s/p IVF  - hold lisinopril  - avoid nephrotoxic agents    #HTN  - currently normotensive  - Hold ACE  - restart on amlodipine if hypertensive    #HLD  - C/w statin    #Hypothyroidism  - C/w levothyroxine 112mcg        #FEN/PPX  - regular diet  - SQH for DVT ppx      Dispo: pending [facility] 89F w/ hx of HTN, HLD, mild cognitive decline p/w decreased PO intake. Found to have ROSANA and UTI    #Failure to thrive  #UTI  - reports decrease PO intake for past year after the fall. Denies nausea  - Denies hx of malignancy  - CT Head negative  - CXR negative for consolidation  - UA positive for LE  - C/w Rocephin  - C/w mirtazipine  - follow up UCx  - encourage PO intake  - PT eval    #ROSANA, improved  - likely from decrease PO intake, on ACEi  - s/p IVF  - hold lisinopril  - avoid nephrotoxic agents    #HTN  - currently normotensive  - Hold ACE  - restart on amlodipine if hypertensive    #HLD  - C/w statin    #Hypothyroidism  - C/w levothyroxine 112mcg        #FEN/PPX  - regular diet  - SQH for DVT ppx      Dispo: PT recommend [facility]. Family would like pt to go home

## 2024-06-11 NOTE — PROGRESS NOTE ADULT - SUBJECTIVE AND OBJECTIVE BOX
PROGRESS NOTE:     Patient is a 89y old  Female who presents with a chief complaint of         SUBJECTIVE & OBJECTIVE:   Pt seen and examined at bedside in AM. eating better now    no overnight events.       REVIEW OF SYSTEMS: remaining ROS negative     PHYSICAL EXAM:  VITALS:  Vital Signs Last 24 Hrs  T(C): 36.4 (11 Jun 2024 12:45), Max: 36.6 (10 Kareem 2024 16:43)  T(F): 97.5 (11 Jun 2024 12:45), Max: 97.8 (10 Kareem 2024 16:43)  HR: 73 (11 Jun 2024 12:45) (57 - 73)  BP: 144/81 (11 Jun 2024 12:45) (114/73 - 156/70)  BP(mean): --  RR: 18 (11 Jun 2024 12:45) (18 - 19)  SpO2: 100% (11 Jun 2024 12:45) (94% - 100%)    Parameters below as of 11 Jun 2024 12:45  Patient On (Oxygen Delivery Method): room air        GENERAL: NAD,  no increased WOB  HEAD:  Atraumatic, Normocephalic  EYES: EOMI, conjunctiva and sclera clear  ENMT: Moist mucous membranes  NECK: Supple, No JVD  NERVOUS SYSTEM:  Alert   CHEST/LUNG: Clear to auscultation bilaterally; No rales, rhonchi, wheezing  HEART: Regular rate and rhythm  ABDOMEN: Soft, Nontender, Nondistended; Bowel sounds present  EXTREMITIES:  No clubbing, cyanosis, calf tenderness or edema b/l      MEDICATIONS  (STANDING):  amLODIPine   Tablet 2.5 milliGRAM(s) Oral daily  aspirin  chewable 81 milliGRAM(s) Oral daily  cefTRIAXone   IVPB 1000 milliGRAM(s) IV Intermittent every 24 hours  heparin   Injectable 5000 Unit(s) SubCutaneous every 8 hours  lactated ringers. 1000 milliLiter(s) (60 mL/Hr) IV Continuous <Continuous>  levothyroxine 112 MICROGram(s) Oral daily  mirtazapine 7.5 milliGRAM(s) Oral daily  potassium phosphate / sodium phosphate Powder (PHOS-NaK) 1 Packet(s) Oral two times a day  simvastatin 10 milliGRAM(s) Oral at bedtime    MEDICATIONS  (PRN):  acetaminophen     Tablet .. 650 milliGRAM(s) Oral every 6 hours PRN Temp greater or equal to 38C (100.4F), Mild Pain (1 - 3)  aluminum hydroxide/magnesium hydroxide/simethicone Suspension 30 milliLiter(s) Oral every 4 hours PRN Dyspepsia  melatonin 3 milliGRAM(s) Oral at bedtime PRN Insomnia  ondansetron Injectable 4 milliGRAM(s) IV Push every 8 hours PRN Nausea and/or Vomiting        Allergies    No Known Allergies    Intolerances              LABS:                           12.7   5.75  )-----------( 250      ( 10 Kareem 2024 05:35 )             39.3     06-10    142  |  109<H>  |  24<H>  ----------------------------<  78  3.2<L>   |  26  |  0.97    Ca    9.7      10 Kareem 2024 05:35    TPro  7.3  /  Alb  2.8<L>  /  TBili  0.4  /  DBili  x   /  AST  6<L>  /  ALT  8<L>  /  AlkPhos  67  06-09      Urinalysis Basic - ( 10 Kareem 2024 05:35 )    Color: x / Appearance: x / SG: x / pH: x  Gluc: 78 mg/dL / Ketone: x  / Bili: x / Urobili: x   Blood: x / Protein: x / Nitrite: x   Leuk Esterase: x / RBC: x / WBC x   Sq Epi: x / Non Sq Epi: x / Bacteria: x      CAPILLARY BLOOD GLUCOSE                    RECENT CULTURES:          RADIOLOGY & ADDITIONAL TESTS:  < from: CT Head No Cont (06.09.24 @ 15:04) >  IMPRESSION:    No acute intracranial hemorrhage or mass effect. No significant change     < end of copied text >      < from: Xray Chest 1 View- PORTABLE-Urgent (Xray Chest 1 View- PORTABLE-Urgent .) (06.09.24 @ 13:23) >  IMPRESSION:  No radiographic evidence of active chest disease..    < end of copied text >  
PROGRESS NOTE:     Patient is a 89y old  Female who presents with a chief complaint of         SUBJECTIVE & OBJECTIVE:   Pt seen and examined at bedside in AM    no overnight events.       REVIEW OF SYSTEMS: remaining ROS negative     PHYSICAL EXAM:  VITALS:  Vital Signs Last 24 Hrs  T(C): 36.5 (10 Kareem 2024 10:24), Max: 36.5 (10 Kareem 2024 10:24)  T(F): 97.7 (10 Kareem 2024 10:24), Max: 97.7 (10 Kareem 2024 10:24)  HR: 62 (10 Kareem 2024 10:24) (56 - 64)  BP: 118/74 (10 Kareem 2024 10:24) (112/65 - 148/89)  BP(mean): 72 (09 Jun 2024 20:27) (72 - 72)  RR: 18 (10 Kareem 2024 10:24) (14 - 19)  SpO2: 96% (10 Kareem 2024 10:24) (95% - 100%)    Parameters below as of 10 Kareem 2024 10:24  Patient On (Oxygen Delivery Method): room air          GENERAL: NAD,  no increased WOB  HEAD:  Atraumatic, Normocephalic  EYES: EOMI, conjunctiva and sclera clear  ENMT: Moist mucous membranes  NECK: Supple, No JVD  NERVOUS SYSTEM:  Alert   CHEST/LUNG: Clear to auscultation bilaterally; No rales, rhonchi, wheezing  HEART: Regular rate and rhythm  ABDOMEN: Soft, Nontender, Nondistended; Bowel sounds present  EXTREMITIES:  No clubbing, cyanosis, calf tenderness or edema b/l      MEDICATIONS  (STANDING):  cefTRIAXone   IVPB 1000 milliGRAM(s) IV Intermittent every 24 hours  heparin   Injectable 5000 Unit(s) SubCutaneous every 8 hours  levothyroxine 112 MICROGram(s) Oral daily  mirtazapine 7.5 milliGRAM(s) Oral daily  simvastatin 10 milliGRAM(s) Oral at bedtime    MEDICATIONS  (PRN):  acetaminophen     Tablet .. 650 milliGRAM(s) Oral every 6 hours PRN Temp greater or equal to 38C (100.4F), Mild Pain (1 - 3)  aluminum hydroxide/magnesium hydroxide/simethicone Suspension 30 milliLiter(s) Oral every 4 hours PRN Dyspepsia  melatonin 3 milliGRAM(s) Oral at bedtime PRN Insomnia  ondansetron Injectable 4 milliGRAM(s) IV Push every 8 hours PRN Nausea and/or Vomiting      Allergies    No Known Allergies    Intolerances              LABS:                           12.7   5.75  )-----------( 250      ( 10 Kareem 2024 05:35 )             39.3     06-10    142  |  109<H>  |  24<H>  ----------------------------<  78  3.2<L>   |  26  |  0.97    Ca    9.7      10 Kareem 2024 05:35    TPro  7.3  /  Alb  2.8<L>  /  TBili  0.4  /  DBili  x   /  AST  6<L>  /  ALT  8<L>  /  AlkPhos  67  06-09      Urinalysis Basic - ( 10 Kareem 2024 05:35 )    Color: x / Appearance: x / SG: x / pH: x  Gluc: 78 mg/dL / Ketone: x  / Bili: x / Urobili: x   Blood: x / Protein: x / Nitrite: x   Leuk Esterase: x / RBC: x / WBC x   Sq Epi: x / Non Sq Epi: x / Bacteria: x      CAPILLARY BLOOD GLUCOSE                    RECENT CULTURES:          RADIOLOGY & ADDITIONAL TESTS:  < from: CT Head No Cont (06.09.24 @ 15:04) >  IMPRESSION:    No acute intracranial hemorrhage or mass effect. No significant change     < end of copied text >      < from: Xray Chest 1 View- PORTABLE-Urgent (Xray Chest 1 View- PORTABLE-Urgent .) (06.09.24 @ 13:23) >  IMPRESSION:  No radiographic evidence of active chest disease..    < end of copied text >

## 2024-06-11 NOTE — DIETITIAN NUTRITION RISK NOTIFICATION - TREATMENT: THE FOLLOWING DIET HAS BEEN RECOMMENDED
Diet, Easy to Chew (06-11-24 @ 15:54) [Pending Verification By Attending]  Diet, Regular (06-09-24 @ 18:17) [Active]

## 2024-06-11 NOTE — DISCHARGE NOTE PROVIDER - DETAILS OF MALNUTRITION DIAGNOSIS/DIAGNOSES
This patient has been assessed with a concern for Malnutrition and was treated during this hospitalization for the following Nutrition diagnosis/diagnoses:     -  06/11/2024: Moderate protein-calorie malnutrition

## 2024-06-11 NOTE — DIETITIAN INITIAL EVALUATION ADULT - ETIOLOGY-BASIS
Subjective Data:  Patient reports feeling well today and denies chest pain, awaiting MID CABG tomorrow with Dr. Eileen Neal.     - planning for MID- CABG on Wednesday   - NPO ordered, type and screen active  - pending TSH with reflex per endo  - pending Endo recs for NPO insulin orders    Overnight Events:    No overnight events.     Objective Data:  Last Recorded Vitals:  Vitals:    10/09/23 1933 10/09/23 2308 10/10/23 0326 10/10/23 0813   BP: 126/66 120/64 117/61 116/61   BP Location:    Right arm   Patient Position:    Lying   Pulse: 85 82 71 72   Resp: 18 16 16 18   Temp: 36.4 °C (97.5 °F) 36.5 °C (97.7 °F) 36.2 °C (97.2 °F) 36.4 °C (97.5 °F)   TempSrc: Temporal Temporal Temporal Temporal   SpO2: 94% 94% 96% 97%   Weight:   78.7 kg (173 lb 8 oz)    Height:           Last Labs:  CBC - 10/9/2023:  5:46 PM  9.7 9.3 301    30.8      CMP - 10/9/2023:  9:17 PM  8.6 5.1 16 --- 0.4   3.1 3.1 19 70      PTT - 10/9/2023:  4:07 PM  1.2   13.1 65        Last I/O:  I/O last 3 completed shifts:  In: 11046.5 (152.4 mL/kg) [P.O.:240; I.V.:98 (1.2 mL/kg); Blood:48818.5]  Out: 3203 (40.7 mL/kg) [Urine:3120 (1.1 mL/kg/hr); Other:83]  Weight: 78.7 kg       Echo:  Transthoracic Echo (TTE) Complete 10/2/2023     1. Left ventricular systolic function is normal with a 55-60% estimated ejection fraction.   2. There is a small pericardial effusion.    [facility] 9/20:  1. Presented with non-STEMI A-fib RVR and moderate pericardial effusion.  2. Catheterization reveals moderately severe coronary calcification.  3. Left main is almost an existing.  4. 3 lesions in the LAD in the range of 75 to 80% in the proximal, early mid and late mid segments.  5. Proximal OM1 90%.  6. Distal circumflex 95% right before the takeoff of the left PDA.  7. RCA is nondominant.  8. Anteroapical hypokinesis is mild to moderate.  9. Can be considered for PCI/possible rotablation after verifying stability of pericardial effusion.      Inpatient Medications:  Scheduled  medications   Medication Dose Route Frequency    amLODIPine  10 mg oral Daily    aspirin  81 mg oral Daily    atorvastatin  80 mg oral Daily    B complex-vitamin C-folic acid  1 capsule oral Daily    cholecalciferol  25 mcg oral Daily    darbepoetin alyssa  40 mcg intravenous Weekly    DULoxetine  30 mg oral Daily    fluticasone  2 spray Each Nostril Daily    gentamicin   Topical Daily    insulin detemir  11 Units subcutaneous q24h    insulin lispro  0-5 Units subcutaneous TID with meals    insulin lispro  3 Units subcutaneous Daily with breakfast    insulin lispro  5 Units subcutaneous Daily with lunch    insulin lispro  5 Units subcutaneous Daily with evening meal    levothyroxine  25 mcg oral Daily    mupirocin  0.5 Application Topical BID    sennosides  2 tablet oral BID    torsemide  50 mg oral Daily     PRN medications   Medication    acetaminophen    dextrose 10 % in water (D10W)    dextrose    glucagon    heparin     Continuous Medications   Medication Dose Last Rate    dextrose 1.5% - LOW calcium 2.5mEq/L (Dianeal, Delflex-LC) in 3,000 mL peritoneal dialysate        heparin  0-4,000 Units/hr 12 Units/hr (10/10/23 0855)       Physical Exam:  Gen: no apparent distress, alert   Skin: warm and dry, no rashes  ENMT: oral membranes moist  Lungs: respirations even and unlabored, CTA anteriorly, posterior rales present  Heart: RRR S1S2 I/IV systolic murmur right sternal border, no  gallop  Abd: soft, NT, ND + BS; + burping and indigestion, +PD catheter  Ext: warm with palpable radial and DP pulses  Neuro: A & O x3, speech clear         Assessment/Plan   Salomon Chowdary is an 81 yo male with a PMHx of insulin dep (type2) diabetes, ESRD on PD, chronic pain, HLD, HTN, hypothyroidism and arthritis who initially presented to OSH with chest pain and was found to have 2 vessel disease and was transferred to [facility] for CABG vs PCI evaluation.       CAD/ 2 vessel disease  NSTEMI  - admitted with CP, symptomatic with SOB and dry  heaves with exertion at times recently  - troponin 122>608>1661 om 9/20, troponin 10/2: 24  - Echo 9/20 EF 50-55%, impaired relaxation of LV diastolic filling, small to moderate pericardial effusion  - repeat TTE 10/2: EF 55-60%, no WMA's, small pericardial effusion   - LHC on 9/20 showing disease  LAD of 75-80% in the proximal, early mid and late mid segments.   Lcx: distal cx 95% right before takeoff of left PDA  - consulted IC for evaluation for PCI & consult CTS for CABG eval--> presented at [facility] meeting 10/3  - decision made for mid-CABG tentatively Wednesday 10/11 with Dr. Eileen Neal   - pre-op studies completed: vascular (vein mapping & carotids), CT Chest, CXR, PFT's (pre-hill report in front of patient's chart; FEV1/FVC 87)  - COVID negative on 10/1, repeat COVID and T&S collected  - 10/6 HR dropped into the 40's on metop tart 12.5mg BID  - HR now stable 70-80s since holding BB, continue holding   - cont ASA & statin  - remains chest pain free  - continue heparin gtt as patient had episode of new onset atrial fib at OSH    Anemia of chronic disease, stable  - no need to check iron studies since patient has known ESRD  - pt previously getting epo with PD  - Hgb: 9.3 (8.9, 9.3, 8.8, 7.9, 9.1, 8.4, 7.8)  - s/p 1UPRBC on 10/5 for optimization prior to surgery  - no overt signs of bleeding, denies increased fatigue  - cont. darbepoietin IV 40mcg weekly per renal (every Friday)    ESRD  - primary nephrologist Dr. Khan  - pt on nightly PD since 3/2023  - nephology following: darbepoietin (as above), cont. protein supplement (adding BOOST TID), daily renal MVI, stopped sodium bicarb on 10/5,cont. Nightly PD   - Cr today: pending (4.93)    New onset A fib with RVR, improving  - episode at OSH  - converted to NSR  with metoprolol 5mg IV X1  - currently NSR on tele,  no further episodes of A fib  - hold metop tart (as above)  - continue heparin gtt      HTN, controlled  - SBP past 24hrs: 110- 120s  - continue home  amlodipine 10mg daily, torsemide 50mg daily      T2IDDM  - 9/30 Hgb A1C: 6.6%  - pt on levemir 25u daily and trulicity 1.5mg/0.5ml once weekly on Tuesdays at home  - 10/4 episode of nocturnal hypogylcemia 40 prompting endo consult--->detemir decreased from 20U>11U  - no hypoglycemia since adjustment was made  - goal -180's, cont. Daily detemir 11 units, lispro 3U at breakfast, lispro 5U at lunch & dinner + Mild SSI, ACHS accuchecks, hypoglycemia protocol   - pending endo recs for NPO status at midnight    Hypothyroidism  - 10/2 TSH 6.26, free T3 1.12  - endocrine following: cont. home LT4 25mcg  - repeat TFT with reflex added- on today per Endo, pending result     Free intraperitoneal air seen on CT chest  - abd soft without tenderness  - likely from PD catheter  - cont to monitor closely    Wound on buttocks  - wife changed dressing 9/30, patient complains of pain to site  - 10/2 consult wound care RN   - wound care recs appreciated/ordered    Spinal Stenosis  - wears neck brace for neck support  - baseline can do ADL's, walking long distances needs scooter due to chronic pain issues, neuropathy in feet, exertional SOB   - Declined PT/OT for now, also has exertional SOB and nausea with activity therefore recommend limited activity until evaluated by CTS     DVT Ppx: heparin gtt  DISPO: tentative MIDCABG Wednesday 10/11 with Dr. Eileen Neal     Pt seen and examined and plan of care discussed with Dr. Montemayor      Code Status:  Full Code    Maddi Butts, APRN-CNP   Chronic illness

## 2024-06-12 ENCOUNTER — TRANSCRIPTION ENCOUNTER (OUTPATIENT)
Age: 89
End: 2024-06-12

## 2024-06-12 VITALS
OXYGEN SATURATION: 96 % | TEMPERATURE: 98 F | SYSTOLIC BLOOD PRESSURE: 118 MMHG | DIASTOLIC BLOOD PRESSURE: 78 MMHG | HEART RATE: 74 BPM | RESPIRATION RATE: 17 BRPM

## 2024-06-12 LAB
ANION GAP SERPL CALC-SCNC: 6 MMOL/L — SIGNIFICANT CHANGE UP (ref 5–17)
BUN SERPL-MCNC: 11 MG/DL — SIGNIFICANT CHANGE UP (ref 7–23)
CALCIUM SERPL-MCNC: 9.4 MG/DL — SIGNIFICANT CHANGE UP (ref 8.5–10.1)
CHLORIDE SERPL-SCNC: 111 MMOL/L — HIGH (ref 96–108)
CO2 SERPL-SCNC: 26 MMOL/L — SIGNIFICANT CHANGE UP (ref 22–31)
CREAT SERPL-MCNC: 0.82 MG/DL — SIGNIFICANT CHANGE UP (ref 0.5–1.3)
EGFR: 68 ML/MIN/1.73M2 — SIGNIFICANT CHANGE UP
GLUCOSE SERPL-MCNC: 90 MG/DL — SIGNIFICANT CHANGE UP (ref 70–99)
MAGNESIUM SERPL-MCNC: 1.6 MG/DL — SIGNIFICANT CHANGE UP (ref 1.6–2.6)
PHOSPHATE SERPL-MCNC: 2.3 MG/DL — LOW (ref 2.5–4.5)
POTASSIUM SERPL-MCNC: 4.1 MMOL/L — SIGNIFICANT CHANGE UP (ref 3.5–5.3)
POTASSIUM SERPL-SCNC: 4.1 MMOL/L — SIGNIFICANT CHANGE UP (ref 3.5–5.3)
SODIUM SERPL-SCNC: 143 MMOL/L — SIGNIFICANT CHANGE UP (ref 135–145)
T4 AB SER-ACNC: 12 UG/DL — SIGNIFICANT CHANGE UP (ref 4.6–12)

## 2024-06-12 PROCEDURE — 99239 HOSP IP/OBS DSCHRG MGMT >30: CPT

## 2024-06-12 RX ORDER — AMLODIPINE BESYLATE 2.5 MG/1
1 TABLET ORAL
Qty: 0 | Refills: 0 | DISCHARGE

## 2024-06-12 RX ORDER — MIRTAZAPINE 45 MG/1
1 TABLET, ORALLY DISINTEGRATING ORAL
Qty: 30 | Refills: 0
Start: 2024-06-12 | End: 2024-07-11

## 2024-06-12 RX ORDER — ASPIRIN/CALCIUM CARB/MAGNESIUM 324 MG
1 TABLET ORAL
Qty: 30 | Refills: 0
Start: 2024-06-12 | End: 2024-07-11

## 2024-06-12 RX ORDER — ACETAMINOPHEN 500 MG
2 TABLET ORAL
Qty: 0 | Refills: 0 | DISCHARGE
Start: 2024-06-12

## 2024-06-12 RX ORDER — SODIUM,POTASSIUM PHOSPHATES 278-250MG
1 POWDER IN PACKET (EA) ORAL ONCE
Refills: 0 | Status: COMPLETED | OUTPATIENT
Start: 2024-06-12 | End: 2024-06-12

## 2024-06-12 RX ORDER — LEVOTHYROXINE SODIUM 125 MCG
1 TABLET ORAL
Qty: 30 | Refills: 0
Start: 2024-06-12 | End: 2024-07-11

## 2024-06-12 RX ORDER — LEVOTHYROXINE SODIUM 125 MCG
1 TABLET ORAL
Qty: 0 | Refills: 0 | DISCHARGE

## 2024-06-12 RX ORDER — AMLODIPINE BESYLATE 2.5 MG/1
1 TABLET ORAL
Qty: 30 | Refills: 0
Start: 2024-06-12 | End: 2024-07-11

## 2024-06-12 RX ADMIN — Medication 81 MILLIGRAM(S): at 11:05

## 2024-06-12 RX ADMIN — CEFTRIAXONE 100 MILLIGRAM(S): 500 INJECTION, POWDER, FOR SOLUTION INTRAMUSCULAR; INTRAVENOUS at 05:46

## 2024-06-12 RX ADMIN — Medication 112 MICROGRAM(S): at 05:46

## 2024-06-12 RX ADMIN — AMLODIPINE BESYLATE 2.5 MILLIGRAM(S): 2.5 TABLET ORAL at 05:46

## 2024-06-12 RX ADMIN — HEPARIN SODIUM 5000 UNIT(S): 5000 INJECTION INTRAVENOUS; SUBCUTANEOUS at 13:25

## 2024-06-12 RX ADMIN — MIRTAZAPINE 7.5 MILLIGRAM(S): 45 TABLET, ORALLY DISINTEGRATING ORAL at 11:05

## 2024-06-12 RX ADMIN — Medication 1 PACKET(S): at 10:38

## 2024-06-12 RX ADMIN — HEPARIN SODIUM 5000 UNIT(S): 5000 INJECTION INTRAVENOUS; SUBCUTANEOUS at 05:46

## 2024-06-12 NOTE — DISCHARGE NOTE NURSING/CASE MANAGEMENT/SOCIAL WORK - NSDCPEFALRISK_GEN_ALL_CORE
For information on Fall & Injury Prevention, visit: [email or web address] OR  [email or web address] OR  https://www.cdc.gov/steadi/patient.html

## 2024-06-12 NOTE — DISCHARGE NOTE NURSING/CASE MANAGEMENT/SOCIAL WORK - PATIENT PORTAL LINK FT
You can access the FollowMyHealth Patient Portal offered by [facility] by registering at the following website: [email or web address]. By joining [employer]’s FollowMyHealth portal, you will also be able to view your health information using other applications (apps) compatible with our system.

## 2024-06-13 LAB
CULTURE RESULTS: SIGNIFICANT CHANGE UP
SPECIMEN SOURCE: SIGNIFICANT CHANGE UP

## 2024-06-15 LAB
CULTURE RESULTS: SIGNIFICANT CHANGE UP
SPECIMEN SOURCE: SIGNIFICANT CHANGE UP

## 2024-06-19 DIAGNOSIS — Z79.890 HORMONE REPLACEMENT THERAPY: ICD-10-CM

## 2024-06-19 DIAGNOSIS — G31.84 MILD COGNITIVE IMPAIRMENT OF UNCERTAIN OR UNKNOWN ETIOLOGY: ICD-10-CM

## 2024-06-19 DIAGNOSIS — N39.0 URINARY TRACT INFECTION, SITE NOT SPECIFIED: ICD-10-CM

## 2024-06-19 DIAGNOSIS — N17.9 ACUTE KIDNEY FAILURE, UNSPECIFIED: ICD-10-CM

## 2024-06-19 DIAGNOSIS — I10 ESSENTIAL (PRIMARY) HYPERTENSION: ICD-10-CM

## 2024-06-19 DIAGNOSIS — E78.5 HYPERLIPIDEMIA, UNSPECIFIED: ICD-10-CM

## 2024-06-19 DIAGNOSIS — R62.7 ADULT FAILURE TO THRIVE: ICD-10-CM

## 2024-06-19 DIAGNOSIS — E44.0 MODERATE PROTEIN-CALORIE MALNUTRITION: ICD-10-CM

## 2024-06-19 DIAGNOSIS — E03.9 HYPOTHYROIDISM, UNSPECIFIED: ICD-10-CM

## 2024-09-23 NOTE — PHYSICAL THERAPY INITIAL EVALUATION ADULT - FUNCTIONAL LIMITATIONS, PT EVAL
Medication & Strength: AMITRIPTYLINE 100MG (HUNDRED MG) TAB  Quantity: 90        Message: We do not compound at our [facility] pharmacy. Please send to the compounding pharmacy or you can try the 24 hour location on [employer] ([facility]). Thanks      self-care

## 2024-10-29 NOTE — DIETITIAN INITIAL EVALUATION ADULT - CALCULATED FROM (G/KG)
Speech Therapy    Patient not seen in therapy.    Treatment on hold due to nursing request and medical condition.      Additional details:  Discussed plan of care with RN who asked that swallow eval be held, ok to proceed with communication cognition assessment.  Requested orders as initially only had \"if fails dysphagia screen orders.\"  OT then notified writer that pt with change in status and not appropriate for evaluation at this time.  Will reattempt later this date vs tomorrow as appropriate.                               Therapy procedure time and total treatment time can be found documented on the Time Entry flowsheet   55.5

## 2024-12-13 NOTE — DISCHARGE NOTE PROVIDER - NSRESEARCHGRANT_PROPHYLAXISRECOMFT_GEN_A_CORE
Performing Laboratory: 0 Billing Type: Third-Party Bill Bill For Surgical Tray: no Expected Date Of Service: 12/13/2024 No post-discharge thromboprophylaxis indicated. Clinical Notes (To The Lab): Standing order IMPROVE-DD Application Not Available

## 2025-01-01 ENCOUNTER — INPATIENT (INPATIENT)
Facility: HOSPITAL | Age: 89
LOS: 4 days | End: 2025-05-05
Attending: INTERNAL MEDICINE | Admitting: STUDENT IN AN ORGANIZED HEALTH CARE EDUCATION/TRAINING PROGRAM
Payer: MEDICARE

## 2025-01-01 VITALS
HEART RATE: 41 BPM | OXYGEN SATURATION: 100 % | SYSTOLIC BLOOD PRESSURE: 155 MMHG | RESPIRATION RATE: 12 BRPM | DIASTOLIC BLOOD PRESSURE: 89 MMHG

## 2025-01-01 DIAGNOSIS — S31.000A UNSPECIFIED OPEN WOUND OF LOWER BACK AND PELVIS WITHOUT PENETRATION INTO RETROPERITONEUM, INITIAL ENCOUNTER: ICD-10-CM

## 2025-01-01 DIAGNOSIS — A41.9 SEPSIS, UNSPECIFIED ORGANISM: ICD-10-CM

## 2025-01-01 DIAGNOSIS — Z29.9 ENCOUNTER FOR PROPHYLACTIC MEASURES, UNSPECIFIED: ICD-10-CM

## 2025-01-01 DIAGNOSIS — R41.82 ALTERED MENTAL STATUS, UNSPECIFIED: ICD-10-CM

## 2025-01-01 DIAGNOSIS — R74.01 ELEVATION OF LEVELS OF LIVER TRANSAMINASE LEVELS: ICD-10-CM

## 2025-01-01 DIAGNOSIS — E03.9 HYPOTHYROIDISM, UNSPECIFIED: ICD-10-CM

## 2025-01-01 DIAGNOSIS — J96.02 ACUTE RESPIRATORY FAILURE WITH HYPERCAPNIA: ICD-10-CM

## 2025-01-01 DIAGNOSIS — I10 ESSENTIAL (PRIMARY) HYPERTENSION: ICD-10-CM

## 2025-01-01 DIAGNOSIS — E78.5 HYPERLIPIDEMIA, UNSPECIFIED: ICD-10-CM

## 2025-01-01 DIAGNOSIS — H40.9 UNSPECIFIED GLAUCOMA: ICD-10-CM

## 2025-01-01 LAB
-  AMPICILLIN/SULBACTAM: SIGNIFICANT CHANGE UP
-  AMPICILLIN: SIGNIFICANT CHANGE UP
-  AZTREONAM: SIGNIFICANT CHANGE UP
-  CEFAZOLIN: SIGNIFICANT CHANGE UP
-  CEFEPIME: SIGNIFICANT CHANGE UP
-  CEFTRIAXONE: SIGNIFICANT CHANGE UP
-  CEFUROXIME: SIGNIFICANT CHANGE UP
-  CIPROFLOXACIN: SIGNIFICANT CHANGE UP
-  ERTAPENEM: SIGNIFICANT CHANGE UP
-  GENTAMICIN: SIGNIFICANT CHANGE UP
-  LEVOFLOXACIN: SIGNIFICANT CHANGE UP
-  MEROPENEM: SIGNIFICANT CHANGE UP
-  NITROFURANTOIN: SIGNIFICANT CHANGE UP
-  PIPERACILLIN/TAZOBACTAM: SIGNIFICANT CHANGE UP
-  TOBRAMYCIN: SIGNIFICANT CHANGE UP
-  TRIMETHOPRIM/SULFAMETHOXAZOLE: SIGNIFICANT CHANGE UP
ADD ON TEST-SPECIMEN IN LAB: SIGNIFICANT CHANGE UP
ALBUMIN SERPL ELPH-MCNC: 2.7 G/DL — LOW (ref 3.3–5)
ALBUMIN SERPL ELPH-MCNC: 3 G/DL — LOW (ref 3.3–5)
ALBUMIN SERPL ELPH-MCNC: 3.3 G/DL — SIGNIFICANT CHANGE UP (ref 3.3–5)
ALP SERPL-CCNC: 110 U/L — SIGNIFICANT CHANGE UP (ref 40–120)
ALP SERPL-CCNC: 122 U/L — HIGH (ref 40–120)
ALP SERPL-CCNC: 137 U/L — HIGH (ref 40–120)
ALP SERPL-CCNC: 146 U/L — HIGH (ref 40–120)
ALP SERPL-CCNC: 147 U/L — HIGH (ref 40–120)
ALT FLD-CCNC: 173 U/L — HIGH (ref 4–33)
ALT FLD-CCNC: 235 U/L — HIGH (ref 4–33)
ALT FLD-CCNC: 265 U/L — HIGH (ref 4–33)
ALT FLD-CCNC: 281 U/L — HIGH (ref 4–33)
ALT FLD-CCNC: 343 U/L — HIGH (ref 4–33)
ANION GAP SERPL CALC-SCNC: 11 MMOL/L — SIGNIFICANT CHANGE UP (ref 7–14)
ANION GAP SERPL CALC-SCNC: 12 MMOL/L — SIGNIFICANT CHANGE UP (ref 7–14)
ANION GAP SERPL CALC-SCNC: 12 MMOL/L — SIGNIFICANT CHANGE UP (ref 7–14)
ANION GAP SERPL CALC-SCNC: 14 MMOL/L — SIGNIFICANT CHANGE UP (ref 7–14)
ANION GAP SERPL CALC-SCNC: 7 MMOL/L — SIGNIFICANT CHANGE UP (ref 7–14)
ANION GAP SERPL CALC-SCNC: 8 MMOL/L — SIGNIFICANT CHANGE UP (ref 7–14)
ANISOCYTOSIS BLD QL: SLIGHT — SIGNIFICANT CHANGE UP
ANISOCYTOSIS BLD QL: SLIGHT — SIGNIFICANT CHANGE UP
APPEARANCE UR: ABNORMAL
APTT BLD: 37.8 SEC — HIGH (ref 26.1–36.8)
APTT BLD: 37.9 SEC — HIGH (ref 26.1–36.8)
APTT BLD: 41 SEC — HIGH (ref 26.1–36.8)
AST SERPL-CCNC: 102 U/L — HIGH (ref 4–32)
AST SERPL-CCNC: 167 U/L — HIGH (ref 4–32)
AST SERPL-CCNC: 56 U/L — HIGH (ref 4–32)
AST SERPL-CCNC: 74 U/L — HIGH (ref 4–32)
AST SERPL-CCNC: 99 U/L — HIGH (ref 4–32)
BACTERIA # UR AUTO: ABNORMAL /HPF
BASOPHILS # BLD AUTO: 0 K/UL — SIGNIFICANT CHANGE UP (ref 0–0.2)
BASOPHILS # BLD AUTO: 0 K/UL — SIGNIFICANT CHANGE UP (ref 0–0.2)
BASOPHILS # BLD AUTO: 0.02 K/UL — SIGNIFICANT CHANGE UP (ref 0–0.2)
BASOPHILS NFR BLD AUTO: 0 % — SIGNIFICANT CHANGE UP (ref 0–2)
BASOPHILS NFR BLD AUTO: 0 % — SIGNIFICANT CHANGE UP (ref 0–2)
BASOPHILS NFR BLD AUTO: 0.3 % — SIGNIFICANT CHANGE UP (ref 0–2)
BASOPHILS NFR BLD AUTO: 0.4 % — SIGNIFICANT CHANGE UP (ref 0–2)
BASOPHILS NFR BLD AUTO: 0.5 % — SIGNIFICANT CHANGE UP (ref 0–2)
BILIRUB SERPL-MCNC: <0.2 MG/DL — SIGNIFICANT CHANGE UP (ref 0.2–1.2)
BILIRUB UR-MCNC: NEGATIVE — SIGNIFICANT CHANGE UP
BLD GP AB SCN SERPL QL: NEGATIVE — SIGNIFICANT CHANGE UP
BLOOD GAS ARTERIAL COMPREHENSIVE RESULT: SIGNIFICANT CHANGE UP
BLOOD GAS VENOUS COMPREHENSIVE RESULT: SIGNIFICANT CHANGE UP
BUN SERPL-MCNC: 32 MG/DL — HIGH (ref 7–23)
BUN SERPL-MCNC: 35 MG/DL — HIGH (ref 7–23)
BUN SERPL-MCNC: 36 MG/DL — HIGH (ref 7–23)
BUN SERPL-MCNC: 37 MG/DL — HIGH (ref 7–23)
BURR CELLS BLD QL SMEAR: PRESENT — SIGNIFICANT CHANGE UP
CALCIUM SERPL-MCNC: 10 MG/DL — SIGNIFICANT CHANGE UP (ref 8.4–10.5)
CALCIUM SERPL-MCNC: 8.7 MG/DL — SIGNIFICANT CHANGE UP (ref 8.4–10.5)
CALCIUM SERPL-MCNC: 9.3 MG/DL — SIGNIFICANT CHANGE UP (ref 8.4–10.5)
CALCIUM SERPL-MCNC: 9.4 MG/DL — SIGNIFICANT CHANGE UP (ref 8.4–10.5)
CALCIUM SERPL-MCNC: 9.6 MG/DL — SIGNIFICANT CHANGE UP (ref 8.4–10.5)
CALCIUM SERPL-MCNC: 9.6 MG/DL — SIGNIFICANT CHANGE UP (ref 8.4–10.5)
CAST: 1 /LPF — SIGNIFICANT CHANGE UP (ref 0–4)
CHLORIDE SERPL-SCNC: 108 MMOL/L — HIGH (ref 98–107)
CHLORIDE SERPL-SCNC: 109 MMOL/L — HIGH (ref 98–107)
CHLORIDE SERPL-SCNC: 110 MMOL/L — HIGH (ref 98–107)
CHLORIDE SERPL-SCNC: 112 MMOL/L — HIGH (ref 98–107)
CK MB BLD-MCNC: 4.6 % — HIGH (ref 0–2.5)
CK MB CFR SERPL CALC: 11 NG/ML — HIGH
CK SERPL-CCNC: 240 U/L — HIGH (ref 25–170)
CK SERPL-CCNC: 314 U/L — HIGH (ref 25–170)
CO2 SERPL-SCNC: 21 MMOL/L — LOW (ref 22–31)
CO2 SERPL-SCNC: 22 MMOL/L — SIGNIFICANT CHANGE UP (ref 22–31)
CO2 SERPL-SCNC: 23 MMOL/L — SIGNIFICANT CHANGE UP (ref 22–31)
CO2 SERPL-SCNC: 24 MMOL/L — SIGNIFICANT CHANGE UP (ref 22–31)
CO2 SERPL-SCNC: 25 MMOL/L — SIGNIFICANT CHANGE UP (ref 22–31)
CO2 SERPL-SCNC: 28 MMOL/L — SIGNIFICANT CHANGE UP (ref 22–31)
COLOR SPEC: ABNORMAL
CORTIS AM PEAK SERPL-MCNC: 133.2 UG/DL — HIGH (ref 6–18.4)
CREAT SERPL-MCNC: 0.67 MG/DL — SIGNIFICANT CHANGE UP (ref 0.5–1.3)
CREAT SERPL-MCNC: 0.74 MG/DL — SIGNIFICANT CHANGE UP (ref 0.5–1.3)
CREAT SERPL-MCNC: 0.88 MG/DL — SIGNIFICANT CHANGE UP (ref 0.5–1.3)
CREAT SERPL-MCNC: 0.91 MG/DL — SIGNIFICANT CHANGE UP (ref 0.5–1.3)
CREAT SERPL-MCNC: 1.02 MG/DL — SIGNIFICANT CHANGE UP (ref 0.5–1.3)
CREAT SERPL-MCNC: 1.24 MG/DL — SIGNIFICANT CHANGE UP (ref 0.5–1.3)
CULTURE RESULTS: ABNORMAL
DIFF PNL FLD: ABNORMAL
EGFR: 41 ML/MIN/1.73M2 — LOW
EGFR: 41 ML/MIN/1.73M2 — LOW
EGFR: 52 ML/MIN/1.73M2 — LOW
EGFR: 52 ML/MIN/1.73M2 — LOW
EGFR: 60 ML/MIN/1.73M2 — SIGNIFICANT CHANGE UP
EGFR: 60 ML/MIN/1.73M2 — SIGNIFICANT CHANGE UP
EGFR: 62 ML/MIN/1.73M2 — SIGNIFICANT CHANGE UP
EGFR: 62 ML/MIN/1.73M2 — SIGNIFICANT CHANGE UP
EGFR: 76 ML/MIN/1.73M2 — SIGNIFICANT CHANGE UP
EGFR: 76 ML/MIN/1.73M2 — SIGNIFICANT CHANGE UP
EGFR: 82 ML/MIN/1.73M2 — SIGNIFICANT CHANGE UP
EGFR: 82 ML/MIN/1.73M2 — SIGNIFICANT CHANGE UP
EOSINOPHIL # BLD AUTO: 0 K/UL — SIGNIFICANT CHANGE UP (ref 0–0.5)
EOSINOPHIL # BLD AUTO: 0.01 K/UL — SIGNIFICANT CHANGE UP (ref 0–0.5)
EOSINOPHIL # BLD AUTO: 0.02 K/UL — SIGNIFICANT CHANGE UP (ref 0–0.5)
EOSINOPHIL # BLD AUTO: 0.04 K/UL — SIGNIFICANT CHANGE UP (ref 0–0.5)
EOSINOPHIL # BLD AUTO: 0.13 K/UL — SIGNIFICANT CHANGE UP (ref 0–0.5)
EOSINOPHIL NFR BLD AUTO: 0 % — SIGNIFICANT CHANGE UP (ref 0–6)
EOSINOPHIL NFR BLD AUTO: 0.2 % — SIGNIFICANT CHANGE UP (ref 0–6)
EOSINOPHIL NFR BLD AUTO: 0.4 % — SIGNIFICANT CHANGE UP (ref 0–6)
EOSINOPHIL NFR BLD AUTO: 1 % — SIGNIFICANT CHANGE UP (ref 0–6)
EOSINOPHIL NFR BLD AUTO: 3.5 % — SIGNIFICANT CHANGE UP (ref 0–6)
GAS PNL BLDV: SIGNIFICANT CHANGE UP
GIANT PLATELETS BLD QL SMEAR: PRESENT — SIGNIFICANT CHANGE UP
GLUCOSE BLDC GLUCOMTR-MCNC: 101 MG/DL — HIGH (ref 70–99)
GLUCOSE BLDC GLUCOMTR-MCNC: 125 MG/DL — HIGH (ref 70–99)
GLUCOSE BLDC GLUCOMTR-MCNC: 139 MG/DL — HIGH (ref 70–99)
GLUCOSE BLDC GLUCOMTR-MCNC: 145 MG/DL — HIGH (ref 70–99)
GLUCOSE BLDC GLUCOMTR-MCNC: 150 MG/DL — HIGH (ref 70–99)
GLUCOSE BLDC GLUCOMTR-MCNC: 164 MG/DL — HIGH (ref 70–99)
GLUCOSE BLDC GLUCOMTR-MCNC: 168 MG/DL — HIGH (ref 70–99)
GLUCOSE BLDC GLUCOMTR-MCNC: 95 MG/DL — SIGNIFICANT CHANGE UP (ref 70–99)
GLUCOSE SERPL-MCNC: 125 MG/DL — HIGH (ref 70–99)
GLUCOSE SERPL-MCNC: 128 MG/DL — HIGH (ref 70–99)
GLUCOSE SERPL-MCNC: 138 MG/DL — HIGH (ref 70–99)
GLUCOSE SERPL-MCNC: 153 MG/DL — HIGH (ref 70–99)
GLUCOSE SERPL-MCNC: 154 MG/DL — HIGH (ref 70–99)
GLUCOSE SERPL-MCNC: 166 MG/DL — HIGH (ref 70–99)
GLUCOSE UR QL: NEGATIVE MG/DL — SIGNIFICANT CHANGE UP
HAV IGM SER-ACNC: SIGNIFICANT CHANGE UP
HBV CORE IGM SER-ACNC: SIGNIFICANT CHANGE UP
HBV SURFACE AG SER-ACNC: SIGNIFICANT CHANGE UP
HCT VFR BLD CALC: 29 % — LOW (ref 34.5–45)
HCT VFR BLD CALC: 32.1 % — LOW (ref 34.5–45)
HCT VFR BLD CALC: 33.5 % — LOW (ref 34.5–45)
HCT VFR BLD CALC: 33.5 % — LOW (ref 34.5–45)
HCT VFR BLD CALC: 33.7 % — LOW (ref 34.5–45)
HCT VFR BLD CALC: 36.3 % — SIGNIFICANT CHANGE UP (ref 34.5–45)
HCT VFR BLD CALC: 37.2 % — SIGNIFICANT CHANGE UP (ref 34.5–45)
HCV AB S/CO SERPL IA: 0.2 S/CO — SIGNIFICANT CHANGE UP (ref 0–0.79)
HCV AB SERPL-IMP: SIGNIFICANT CHANGE UP
HGB BLD-MCNC: 10.3 G/DL — LOW (ref 11.5–15.5)
HGB BLD-MCNC: 10.8 G/DL — LOW (ref 11.5–15.5)
HGB BLD-MCNC: 10.9 G/DL — LOW (ref 11.5–15.5)
HGB BLD-MCNC: 10.9 G/DL — LOW (ref 11.5–15.5)
HGB BLD-MCNC: 11.3 G/DL — LOW (ref 11.5–15.5)
HGB BLD-MCNC: 11.7 G/DL — SIGNIFICANT CHANGE UP (ref 11.5–15.5)
HGB BLD-MCNC: 9.2 G/DL — LOW (ref 11.5–15.5)
HYPOCHROMIA BLD QL: SLIGHT — SIGNIFICANT CHANGE UP
IANC: 1.97 K/UL — SIGNIFICANT CHANGE UP (ref 1.8–7.4)
IANC: 2.13 K/UL — SIGNIFICANT CHANGE UP (ref 1.8–7.4)
IANC: 2.71 K/UL — SIGNIFICANT CHANGE UP (ref 1.8–7.4)
IANC: 2.96 K/UL — SIGNIFICANT CHANGE UP (ref 1.8–7.4)
IANC: 3.71 K/UL — SIGNIFICANT CHANGE UP (ref 1.8–7.4)
IMM GRANULOCYTES NFR BLD AUTO: 1 % — HIGH (ref 0–0.9)
IMM GRANULOCYTES NFR BLD AUTO: 2 % — HIGH (ref 0–0.9)
IMM GRANULOCYTES NFR BLD AUTO: 2.1 % — HIGH (ref 0–0.9)
INR BLD: 0.9 RATIO — SIGNIFICANT CHANGE UP (ref 0.85–1.16)
INR BLD: 0.93 RATIO — SIGNIFICANT CHANGE UP (ref 0.85–1.16)
INR BLD: 0.99 RATIO — SIGNIFICANT CHANGE UP (ref 0.85–1.16)
INR BLD: <0.9 RATIO — SIGNIFICANT CHANGE UP (ref 0.85–1.16)
INR BLD: <0.9 RATIO — SIGNIFICANT CHANGE UP (ref 0.85–1.16)
KETONES UR-MCNC: NEGATIVE MG/DL — SIGNIFICANT CHANGE UP
LACTATE BLDV-MCNC: 1.1 MMOL/L — SIGNIFICANT CHANGE UP (ref 0.5–2)
LEUKOCYTE ESTERASE UR-ACNC: ABNORMAL
LYMPHOCYTES # BLD AUTO: 0.99 K/UL — LOW (ref 1–3.3)
LYMPHOCYTES # BLD AUTO: 1.02 K/UL — SIGNIFICANT CHANGE UP (ref 1–3.3)
LYMPHOCYTES # BLD AUTO: 1.26 K/UL — SIGNIFICANT CHANGE UP (ref 1–3.3)
LYMPHOCYTES # BLD AUTO: 1.36 K/UL — SIGNIFICANT CHANGE UP (ref 1–3.3)
LYMPHOCYTES # BLD AUTO: 2.62 K/UL — SIGNIFICANT CHANGE UP (ref 1–3.3)
LYMPHOCYTES # BLD AUTO: 24.8 % — SIGNIFICANT CHANGE UP (ref 13–44)
LYMPHOCYTES # BLD AUTO: 24.9 % — SIGNIFICANT CHANGE UP (ref 13–44)
LYMPHOCYTES # BLD AUTO: 28.8 % — SIGNIFICANT CHANGE UP (ref 13–44)
LYMPHOCYTES # BLD AUTO: 33.9 % — SIGNIFICANT CHANGE UP (ref 13–44)
LYMPHOCYTES # BLD AUTO: 39.3 % — SIGNIFICANT CHANGE UP (ref 13–44)
MACROCYTES BLD QL: SLIGHT — SIGNIFICANT CHANGE UP
MAGNESIUM SERPL-MCNC: 2 MG/DL — SIGNIFICANT CHANGE UP (ref 1.6–2.6)
MAGNESIUM SERPL-MCNC: 2.1 MG/DL — SIGNIFICANT CHANGE UP (ref 1.6–2.6)
MAGNESIUM SERPL-MCNC: 2.1 MG/DL — SIGNIFICANT CHANGE UP (ref 1.6–2.6)
MAGNESIUM SERPL-MCNC: 2.2 MG/DL — SIGNIFICANT CHANGE UP (ref 1.6–2.6)
MCHC RBC-ENTMCNC: 26 PG — LOW (ref 27–34)
MCHC RBC-ENTMCNC: 26.4 PG — LOW (ref 27–34)
MCHC RBC-ENTMCNC: 26.5 PG — LOW (ref 27–34)
MCHC RBC-ENTMCNC: 26.7 PG — LOW (ref 27–34)
MCHC RBC-ENTMCNC: 26.7 PG — LOW (ref 27–34)
MCHC RBC-ENTMCNC: 26.8 PG — LOW (ref 27–34)
MCHC RBC-ENTMCNC: 26.8 PG — LOW (ref 27–34)
MCHC RBC-ENTMCNC: 31.1 G/DL — LOW (ref 32–36)
MCHC RBC-ENTMCNC: 31.5 G/DL — LOW (ref 32–36)
MCHC RBC-ENTMCNC: 31.7 G/DL — LOW (ref 32–36)
MCHC RBC-ENTMCNC: 32 G/DL — SIGNIFICANT CHANGE UP (ref 32–36)
MCHC RBC-ENTMCNC: 32.1 G/DL — SIGNIFICANT CHANGE UP (ref 32–36)
MCHC RBC-ENTMCNC: 32.5 G/DL — SIGNIFICANT CHANGE UP (ref 32–36)
MCHC RBC-ENTMCNC: 32.5 G/DL — SIGNIFICANT CHANGE UP (ref 32–36)
MCV RBC AUTO: 82.3 FL — SIGNIFICANT CHANGE UP (ref 80–100)
MCV RBC AUTO: 82.5 FL — SIGNIFICANT CHANGE UP (ref 80–100)
MCV RBC AUTO: 82.5 FL — SIGNIFICANT CHANGE UP (ref 80–100)
MCV RBC AUTO: 83.3 FL — SIGNIFICANT CHANGE UP (ref 80–100)
MCV RBC AUTO: 83.4 FL — SIGNIFICANT CHANGE UP (ref 80–100)
MCV RBC AUTO: 83.6 FL — SIGNIFICANT CHANGE UP (ref 80–100)
MCV RBC AUTO: 84.9 FL — SIGNIFICANT CHANGE UP (ref 80–100)
METAMYELOCYTES # FLD: 0.9 % — SIGNIFICANT CHANGE UP (ref 0–1)
METAMYELOCYTES NFR BLD: 0.9 % — SIGNIFICANT CHANGE UP (ref 0–1)
METHOD TYPE: SIGNIFICANT CHANGE UP
MICROCYTES BLD QL: SLIGHT — SIGNIFICANT CHANGE UP
MONOCYTES # BLD AUTO: 0.17 K/UL — SIGNIFICANT CHANGE UP (ref 0–0.9)
MONOCYTES # BLD AUTO: 0.17 K/UL — SIGNIFICANT CHANGE UP (ref 0–0.9)
MONOCYTES # BLD AUTO: 0.22 K/UL — SIGNIFICANT CHANGE UP (ref 0–0.9)
MONOCYTES # BLD AUTO: 0.23 K/UL — SIGNIFICANT CHANGE UP (ref 0–0.9)
MONOCYTES # BLD AUTO: 0.27 K/UL — SIGNIFICANT CHANGE UP (ref 0–0.9)
MONOCYTES NFR BLD AUTO: 2.6 % — SIGNIFICANT CHANGE UP (ref 2–14)
MONOCYTES NFR BLD AUTO: 4.3 % — SIGNIFICANT CHANGE UP (ref 2–14)
MONOCYTES NFR BLD AUTO: 5.3 % — SIGNIFICANT CHANGE UP (ref 2–14)
MONOCYTES NFR BLD AUTO: 5.7 % — SIGNIFICANT CHANGE UP (ref 2–14)
MONOCYTES NFR BLD AUTO: 6.1 % — SIGNIFICANT CHANGE UP (ref 2–14)
MRSA PCR RESULT.: SIGNIFICANT CHANGE UP
NEUTROPHILS # BLD AUTO: 1.98 K/UL — SIGNIFICANT CHANGE UP (ref 1.8–7.4)
NEUTROPHILS # BLD AUTO: 2.71 K/UL — SIGNIFICANT CHANGE UP (ref 1.8–7.4)
NEUTROPHILS # BLD AUTO: 2.79 K/UL — SIGNIFICANT CHANGE UP (ref 1.8–7.4)
NEUTROPHILS # BLD AUTO: 2.96 K/UL — SIGNIFICANT CHANGE UP (ref 1.8–7.4)
NEUTROPHILS # BLD AUTO: 3.71 K/UL — SIGNIFICANT CHANGE UP (ref 1.8–7.4)
NEUTROPHILS NFR BLD AUTO: 51.3 % — SIGNIFICANT CHANGE UP (ref 43–77)
NEUTROPHILS NFR BLD AUTO: 55.6 % — SIGNIFICANT CHANGE UP (ref 43–77)
NEUTROPHILS NFR BLD AUTO: 62.6 % — SIGNIFICANT CHANGE UP (ref 43–77)
NEUTROPHILS NFR BLD AUTO: 63.7 % — SIGNIFICANT CHANGE UP (ref 43–77)
NEUTROPHILS NFR BLD AUTO: 68.3 % — SIGNIFICANT CHANGE UP (ref 43–77)
NEUTS BAND # BLD: 1.7 % — SIGNIFICANT CHANGE UP (ref 0–6)
NEUTS BAND # BLD: 4.4 % — SIGNIFICANT CHANGE UP (ref 0–6)
NEUTS BAND NFR BLD: 1.7 % — SIGNIFICANT CHANGE UP (ref 0–6)
NEUTS BAND NFR BLD: 4.4 % — SIGNIFICANT CHANGE UP (ref 0–6)
NITRITE UR-MCNC: NEGATIVE — SIGNIFICANT CHANGE UP
NRBC # BLD AUTO: 0.02 K/UL — HIGH (ref 0–0)
NRBC # BLD AUTO: 0.02 K/UL — HIGH (ref 0–0)
NRBC # BLD AUTO: 0.04 K/UL — HIGH (ref 0–0)
NRBC # BLD AUTO: 0.04 K/UL — HIGH (ref 0–0)
NRBC # BLD AUTO: 0.05 K/UL — HIGH (ref 0–0)
NRBC # FLD: 0.02 K/UL — HIGH (ref 0–0)
NRBC # FLD: 0.02 K/UL — HIGH (ref 0–0)
NRBC # FLD: 0.04 K/UL — HIGH (ref 0–0)
NRBC # FLD: 0.04 K/UL — HIGH (ref 0–0)
NRBC # FLD: 0.05 K/UL — HIGH (ref 0–0)
NRBC BLD AUTO-RTO: 0 /100 WBCS — SIGNIFICANT CHANGE UP (ref 0–0)
NRBC BLD AUTO-RTO: 1 /100 WBCS — HIGH (ref 0–0)
NT-PROBNP SERPL-SCNC: 444 PG/ML — HIGH
ORGANISM # SPEC MICROSCOPIC CNT: ABNORMAL
ORGANISM # SPEC MICROSCOPIC CNT: ABNORMAL
OVALOCYTES BLD QL SMEAR: SLIGHT — SIGNIFICANT CHANGE UP
PH UR: 7 — SIGNIFICANT CHANGE UP (ref 5–8)
PHOSPHATE SERPL-MCNC: 2 MG/DL — LOW (ref 2.5–4.5)
PHOSPHATE SERPL-MCNC: 2.1 MG/DL — LOW (ref 2.5–4.5)
PHOSPHATE SERPL-MCNC: 2.2 MG/DL — LOW (ref 2.5–4.5)
PHOSPHATE SERPL-MCNC: 2.7 MG/DL — SIGNIFICANT CHANGE UP (ref 2.5–4.5)
PHOSPHATE SERPL-MCNC: 3 MG/DL — SIGNIFICANT CHANGE UP (ref 2.5–4.5)
PLAT MORPH BLD: NORMAL — SIGNIFICANT CHANGE UP
PLAT MORPH BLD: NORMAL — SIGNIFICANT CHANGE UP
PLATELET # BLD AUTO: 128 K/UL — LOW (ref 150–400)
PLATELET # BLD AUTO: 128 K/UL — LOW (ref 150–400)
PLATELET # BLD AUTO: 129 K/UL — LOW (ref 150–400)
PLATELET # BLD AUTO: 132 K/UL — LOW (ref 150–400)
PLATELET # BLD AUTO: 139 K/UL — LOW (ref 150–400)
PLATELET # BLD AUTO: 140 K/UL — LOW (ref 150–400)
PLATELET # BLD AUTO: 142 K/UL — LOW (ref 150–400)
PLATELET COUNT - ESTIMATE: ABNORMAL
PLATELET COUNT - ESTIMATE: ABNORMAL
POIKILOCYTOSIS BLD QL AUTO: SLIGHT — SIGNIFICANT CHANGE UP
POIKILOCYTOSIS BLD QL AUTO: SLIGHT — SIGNIFICANT CHANGE UP
POLYCHROMASIA BLD QL SMEAR: SLIGHT — SIGNIFICANT CHANGE UP
POLYCHROMASIA BLD QL SMEAR: SLIGHT — SIGNIFICANT CHANGE UP
POTASSIUM SERPL-MCNC: 4.6 MMOL/L — SIGNIFICANT CHANGE UP (ref 3.5–5.3)
POTASSIUM SERPL-MCNC: 4.6 MMOL/L — SIGNIFICANT CHANGE UP (ref 3.5–5.3)
POTASSIUM SERPL-MCNC: 5 MMOL/L — SIGNIFICANT CHANGE UP (ref 3.5–5.3)
POTASSIUM SERPL-MCNC: 5 MMOL/L — SIGNIFICANT CHANGE UP (ref 3.5–5.3)
POTASSIUM SERPL-MCNC: 5.2 MMOL/L — SIGNIFICANT CHANGE UP (ref 3.5–5.3)
POTASSIUM SERPL-MCNC: 5.3 MMOL/L — SIGNIFICANT CHANGE UP (ref 3.5–5.3)
POTASSIUM SERPL-SCNC: 4.6 MMOL/L — SIGNIFICANT CHANGE UP (ref 3.5–5.3)
POTASSIUM SERPL-SCNC: 4.6 MMOL/L — SIGNIFICANT CHANGE UP (ref 3.5–5.3)
POTASSIUM SERPL-SCNC: 5 MMOL/L — SIGNIFICANT CHANGE UP (ref 3.5–5.3)
POTASSIUM SERPL-SCNC: 5 MMOL/L — SIGNIFICANT CHANGE UP (ref 3.5–5.3)
POTASSIUM SERPL-SCNC: 5.2 MMOL/L — SIGNIFICANT CHANGE UP (ref 3.5–5.3)
POTASSIUM SERPL-SCNC: 5.3 MMOL/L — SIGNIFICANT CHANGE UP (ref 3.5–5.3)
PROCALCITONIN SERPL-MCNC: 0.19 NG/ML — HIGH (ref 0.02–0.1)
PROCALCITONIN SERPL-MCNC: 0.24 NG/ML — HIGH (ref 0.02–0.1)
PROCALCITONIN SERPL-MCNC: 0.26 NG/ML — HIGH (ref 0.02–0.1)
PROT SERPL-MCNC: 5.8 G/DL — LOW (ref 6–8.3)
PROT SERPL-MCNC: 6.5 G/DL — SIGNIFICANT CHANGE UP (ref 6–8.3)
PROT SERPL-MCNC: 7.1 G/DL — SIGNIFICANT CHANGE UP (ref 6–8.3)
PROT SERPL-MCNC: 7.3 G/DL — SIGNIFICANT CHANGE UP (ref 6–8.3)
PROT SERPL-MCNC: 7.7 G/DL — SIGNIFICANT CHANGE UP (ref 6–8.3)
PROT UR-MCNC: 100 MG/DL
PROTHROM AB SERPL-ACNC: 10 SEC — SIGNIFICANT CHANGE UP (ref 9.9–13.4)
PROTHROM AB SERPL-ACNC: 10.1 SEC — SIGNIFICANT CHANGE UP (ref 9.9–13.4)
PROTHROM AB SERPL-ACNC: 10.4 SEC — SIGNIFICANT CHANGE UP (ref 9.9–13.4)
PROTHROM AB SERPL-ACNC: 10.8 SEC — SIGNIFICANT CHANGE UP (ref 9.9–13.4)
PROTHROM AB SERPL-ACNC: 11.5 SEC — SIGNIFICANT CHANGE UP (ref 9.9–13.4)
RBC # BLD: 3.48 M/UL — LOW (ref 3.8–5.2)
RBC # BLD: 3.89 M/UL — SIGNIFICANT CHANGE UP (ref 3.8–5.2)
RBC # BLD: 4.04 M/UL — SIGNIFICANT CHANGE UP (ref 3.8–5.2)
RBC # BLD: 4.06 M/UL — SIGNIFICANT CHANGE UP (ref 3.8–5.2)
RBC # BLD: 4.07 M/UL — SIGNIFICANT CHANGE UP (ref 3.8–5.2)
RBC # BLD: 4.34 M/UL — SIGNIFICANT CHANGE UP (ref 3.8–5.2)
RBC # BLD: 4.38 M/UL — SIGNIFICANT CHANGE UP (ref 3.8–5.2)
RBC # FLD: 20.6 % — HIGH (ref 10.3–14.5)
RBC # FLD: 20.8 % — HIGH (ref 10.3–14.5)
RBC # FLD: 20.9 % — HIGH (ref 10.3–14.5)
RBC # FLD: 21 % — HIGH (ref 10.3–14.5)
RBC # FLD: 21.2 % — HIGH (ref 10.3–14.5)
RBC # FLD: 21.3 % — HIGH (ref 10.3–14.5)
RBC # FLD: 21.3 % — HIGH (ref 10.3–14.5)
RBC BLD AUTO: ABNORMAL
RBC BLD AUTO: ABNORMAL
RBC CASTS # UR COMP ASSIST: 1834 /HPF — HIGH (ref 0–4)
REVIEW: SIGNIFICANT CHANGE UP
RH IG SCN BLD-IMP: POSITIVE — SIGNIFICANT CHANGE UP
S AUREUS DNA NOSE QL NAA+PROBE: SIGNIFICANT CHANGE UP
SMUDGE CELLS # BLD: PRESENT — SIGNIFICANT CHANGE UP
SODIUM SERPL-SCNC: 142 MMOL/L — SIGNIFICANT CHANGE UP (ref 135–145)
SODIUM SERPL-SCNC: 143 MMOL/L — SIGNIFICANT CHANGE UP (ref 135–145)
SODIUM SERPL-SCNC: 144 MMOL/L — SIGNIFICANT CHANGE UP (ref 135–145)
SODIUM SERPL-SCNC: 144 MMOL/L — SIGNIFICANT CHANGE UP (ref 135–145)
SODIUM SERPL-SCNC: 145 MMOL/L — SIGNIFICANT CHANGE UP (ref 135–145)
SODIUM SERPL-SCNC: 146 MMOL/L — HIGH (ref 135–145)
SP GR SPEC: 1.02 — SIGNIFICANT CHANGE UP (ref 1–1.03)
SPECIMEN SOURCE: SIGNIFICANT CHANGE UP
SQUAMOUS # UR AUTO: 1 /HPF — SIGNIFICANT CHANGE UP (ref 0–5)
T4 FREE SERPL-MCNC: <0.3 NG/DL — LOW (ref 0.9–1.8)
THYROPEROXIDASE AB SERPL-ACNC: 10.8 IU/ML — SIGNIFICANT CHANGE UP
TROPONIN T, HIGH SENSITIVITY RESULT: 35 NG/L — SIGNIFICANT CHANGE UP
TROPONIN T, HIGH SENSITIVITY RESULT: 39 NG/L — SIGNIFICANT CHANGE UP
TSH SERPL-MCNC: 49.13 UIU/ML — HIGH (ref 0.27–4.2)
TSH SERPL-MCNC: 56.29 UIU/ML — HIGH (ref 0.27–4.2)
UROBILINOGEN FLD QL: 0.2 MG/DL — SIGNIFICANT CHANGE UP (ref 0.2–1)
VARIANT LYMPHS # BLD: 1.8 % — SIGNIFICANT CHANGE UP (ref 0–6)
VARIANT LYMPHS # BLD: 2.6 % — SIGNIFICANT CHANGE UP (ref 0–6)
VARIANT LYMPHS NFR BLD MANUAL: 1.8 % — SIGNIFICANT CHANGE UP (ref 0–6)
VARIANT LYMPHS NFR BLD MANUAL: 2.6 % — SIGNIFICANT CHANGE UP (ref 0–6)
WBC # BLD: 3.73 K/UL — LOW (ref 3.8–10.5)
WBC # BLD: 3.97 K/UL — SIGNIFICANT CHANGE UP (ref 3.8–10.5)
WBC # BLD: 4.1 K/UL — SIGNIFICANT CHANGE UP (ref 3.8–10.5)
WBC # BLD: 4.45 K/UL — SIGNIFICANT CHANGE UP (ref 3.8–10.5)
WBC # BLD: 4.59 K/UL — SIGNIFICANT CHANGE UP (ref 3.8–10.5)
WBC # BLD: 4.73 K/UL — SIGNIFICANT CHANGE UP (ref 3.8–10.5)
WBC # BLD: 6.66 K/UL — SIGNIFICANT CHANGE UP (ref 3.8–10.5)
WBC # FLD AUTO: 3.73 K/UL — LOW (ref 3.8–10.5)
WBC # FLD AUTO: 3.97 K/UL — SIGNIFICANT CHANGE UP (ref 3.8–10.5)
WBC # FLD AUTO: 4.1 K/UL — SIGNIFICANT CHANGE UP (ref 3.8–10.5)
WBC # FLD AUTO: 4.45 K/UL — SIGNIFICANT CHANGE UP (ref 3.8–10.5)
WBC # FLD AUTO: 4.59 K/UL — SIGNIFICANT CHANGE UP (ref 3.8–10.5)
WBC # FLD AUTO: 4.73 K/UL — SIGNIFICANT CHANGE UP (ref 3.8–10.5)
WBC # FLD AUTO: 6.66 K/UL — SIGNIFICANT CHANGE UP (ref 3.8–10.5)
WBC UR QL: 92 /HPF — HIGH (ref 0–5)

## 2025-01-01 PROCEDURE — 99232 SBSQ HOSP IP/OBS MODERATE 35: CPT

## 2025-01-01 PROCEDURE — 99291 CRITICAL CARE FIRST HOUR: CPT

## 2025-01-01 PROCEDURE — 99222 1ST HOSP IP/OBS MODERATE 55: CPT

## 2025-01-01 PROCEDURE — 99223 1ST HOSP IP/OBS HIGH 75: CPT | Mod: GC

## 2025-01-01 PROCEDURE — 99233 SBSQ HOSP IP/OBS HIGH 50: CPT

## 2025-01-01 PROCEDURE — 70450 CT HEAD/BRAIN W/O DYE: CPT | Mod: 26,XU

## 2025-01-01 PROCEDURE — 99223 1ST HOSP IP/OBS HIGH 75: CPT

## 2025-01-01 PROCEDURE — 71045 X-RAY EXAM CHEST 1 VIEW: CPT | Mod: 26

## 2025-01-01 PROCEDURE — 70498 CT ANGIOGRAPHY NECK: CPT | Mod: 26

## 2025-01-01 PROCEDURE — 70496 CT ANGIOGRAPHY HEAD: CPT | Mod: 26

## 2025-01-01 PROCEDURE — 0042T: CPT

## 2025-01-01 RX ORDER — LEVOTHYROXINE SODIUM 300 MCG
100 TABLET ORAL ONCE
Refills: 0 | Status: COMPLETED | OUTPATIENT
Start: 2025-01-01 | End: 2025-01-01

## 2025-01-01 RX ORDER — POTASSIUM PHOSPHATE, MONOBASIC POTASSIUM PHOSPHATE, DIBASIC INJECTION, 236; 224 MG/ML; MG/ML
15 SOLUTION, CONCENTRATE INTRAVENOUS ONCE
Refills: 0 | Status: COMPLETED | OUTPATIENT
Start: 2025-01-01 | End: 2025-01-01

## 2025-01-01 RX ORDER — B1/B2/B3/B5/B6/B12/VIT C/FOLIC 500-0.5 MG
1 TABLET ORAL
Refills: 0 | DISCHARGE

## 2025-01-01 RX ORDER — CEFEPIME 2 G/20ML
2000 INJECTION, POWDER, FOR SOLUTION INTRAVENOUS EVERY 8 HOURS
Refills: 0 | Status: DISCONTINUED | OUTPATIENT
Start: 2025-01-01 | End: 2025-01-01

## 2025-01-01 RX ORDER — HEPARIN SODIUM 1000 [USP'U]/ML
5000 INJECTION INTRAVENOUS; SUBCUTANEOUS EVERY 8 HOURS
Refills: 0 | Status: DISCONTINUED | OUTPATIENT
Start: 2025-01-01 | End: 2025-01-01

## 2025-01-01 RX ORDER — VANCOMYCIN HCL IN 5 % DEXTROSE 1.5G/250ML
1000 PLASTIC BAG, INJECTION (ML) INTRAVENOUS ONCE
Refills: 0 | Status: COMPLETED | OUTPATIENT
Start: 2025-01-01 | End: 2025-01-01

## 2025-01-01 RX ORDER — HYDROCORTISONE 20 MG
100 TABLET ORAL EVERY 8 HOURS
Refills: 0 | Status: DISCONTINUED | OUTPATIENT
Start: 2025-01-01 | End: 2025-01-01

## 2025-01-01 RX ORDER — PIPERACILLIN-TAZO-DEXTROSE,ISO 2.25G/50ML
3.38 IV SOLUTION, PIGGYBACK PREMIX FROZEN(ML) INTRAVENOUS EVERY 8 HOURS
Refills: 0 | Status: DISCONTINUED | OUTPATIENT
Start: 2025-01-01 | End: 2025-01-01

## 2025-01-01 RX ORDER — TIMOLOL MALEATE 6.8 MG/ML
1 SOLUTION OPHTHALMIC DAILY
Refills: 0 | Status: DISCONTINUED | OUTPATIENT
Start: 2025-01-01 | End: 2025-01-01

## 2025-01-01 RX ORDER — GLYCOPYRROLATE 0.2 MG/ML
0.2 INJECTION INTRAMUSCULAR; INTRAVENOUS EVERY 8 HOURS
Refills: 0 | Status: DISCONTINUED | OUTPATIENT
Start: 2025-01-01 | End: 2025-01-01

## 2025-01-01 RX ORDER — ACETAMINOPHEN 500 MG/5ML
650 LIQUID (ML) ORAL EVERY 6 HOURS
Refills: 0 | Status: DISCONTINUED | OUTPATIENT
Start: 2025-01-01 | End: 2025-01-01

## 2025-01-01 RX ORDER — LEVOTHYROXINE SODIUM 300 MCG
75 TABLET ORAL AT BEDTIME
Refills: 0 | Status: DISCONTINUED | OUTPATIENT
Start: 2025-01-01 | End: 2025-01-01

## 2025-01-01 RX ORDER — LEVOTHYROXINE SODIUM 300 MCG
1 TABLET ORAL
Refills: 0 | DISCHARGE

## 2025-01-01 RX ORDER — LORAZEPAM 4 MG/ML
1 VIAL (ML) INJECTION EVERY 4 HOURS
Refills: 0 | Status: DISCONTINUED | OUTPATIENT
Start: 2025-01-01 | End: 2025-01-01

## 2025-01-01 RX ORDER — CEFEPIME 2 G/20ML
1000 INJECTION, POWDER, FOR SOLUTION INTRAVENOUS ONCE
Refills: 0 | Status: COMPLETED | OUTPATIENT
Start: 2025-01-01 | End: 2025-01-01

## 2025-01-01 RX ORDER — SODIUM CHLORIDE 9 G/1000ML
1000 INJECTION, SOLUTION INTRAVENOUS
Refills: 0 | Status: DISCONTINUED | OUTPATIENT
Start: 2025-01-01 | End: 2025-01-01

## 2025-01-01 RX ORDER — SENNA 187 MG
2 TABLET ORAL
Refills: 0 | DISCHARGE

## 2025-01-01 RX ORDER — DEXAMETHASONE 0.5 MG/1
6 TABLET ORAL ONCE
Refills: 0 | Status: DISCONTINUED | OUTPATIENT
Start: 2025-01-01 | End: 2025-01-01

## 2025-01-01 RX ORDER — HYDROCORTISONE 20 MG
50 TABLET ORAL EVERY 8 HOURS
Refills: 0 | Status: DISCONTINUED | OUTPATIENT
Start: 2025-01-01 | End: 2025-01-01

## 2025-01-01 RX ORDER — HYDROMORPHONE/SOD CHLOR,ISO/PF 2 MG/10 ML
0.5 SYRINGE (ML) INJECTION EVERY 4 HOURS
Refills: 0 | Status: DISCONTINUED | OUTPATIENT
Start: 2025-01-01 | End: 2025-01-01

## 2025-01-01 RX ORDER — LATANOPROST PF 0.05 MG/ML
1 SOLUTION/ DROPS OPHTHALMIC AT BEDTIME
Refills: 0 | Status: DISCONTINUED | OUTPATIENT
Start: 2025-01-01 | End: 2025-01-01

## 2025-01-01 RX ORDER — SODIUM PHOSPHATE,DIBASIC DIHYD
15 POWDER (GRAM) MISCELLANEOUS ONCE
Refills: 0 | Status: COMPLETED | OUTPATIENT
Start: 2025-01-01 | End: 2025-01-01

## 2025-01-01 RX ORDER — NOREPINEPHRINE BITARTRATE 8 MG
0.05 SOLUTION INTRAVENOUS
Qty: 8 | Refills: 0 | Status: DISCONTINUED | OUTPATIENT
Start: 2025-01-01 | End: 2025-01-01

## 2025-01-01 RX ORDER — TIMOLOL MALEATE 6.8 MG/ML
1 SOLUTION OPHTHALMIC
Refills: 0 | DISCHARGE

## 2025-01-01 RX ADMIN — HEPARIN SODIUM 5000 UNIT(S): 1000 INJECTION INTRAVENOUS; SUBCUTANEOUS at 22:00

## 2025-01-01 RX ADMIN — Medication 1 APPLICATION(S): at 13:45

## 2025-01-01 RX ADMIN — Medication 25 GRAM(S): at 05:25

## 2025-01-01 RX ADMIN — Medication 100 MILLIGRAM(S): at 23:35

## 2025-01-01 RX ADMIN — Medication 0.5 MILLIGRAM(S): at 15:20

## 2025-01-01 RX ADMIN — Medication 0.5 MILLIGRAM(S): at 23:43

## 2025-01-01 RX ADMIN — Medication 1 APPLICATION(S): at 11:55

## 2025-01-01 RX ADMIN — TIMOLOL MALEATE 1 DROP(S): 6.8 SOLUTION OPHTHALMIC at 12:21

## 2025-01-01 RX ADMIN — Medication 1000 MILLILITER(S): at 22:16

## 2025-01-01 RX ADMIN — HEPARIN SODIUM 5000 UNIT(S): 1000 INJECTION INTRAVENOUS; SUBCUTANEOUS at 22:33

## 2025-01-01 RX ADMIN — Medication 0.5 MILLIGRAM(S): at 00:10

## 2025-01-01 RX ADMIN — TIMOLOL MALEATE 1 DROP(S): 6.8 SOLUTION OPHTHALMIC at 13:06

## 2025-01-01 RX ADMIN — TIMOLOL MALEATE 1 DROP(S): 6.8 SOLUTION OPHTHALMIC at 13:26

## 2025-01-01 RX ADMIN — Medication 250 MILLIGRAM(S): at 23:35

## 2025-01-01 RX ADMIN — SODIUM CHLORIDE 70 MILLILITER(S): 9 INJECTION, SOLUTION INTRAVENOUS at 16:28

## 2025-01-01 RX ADMIN — Medication 100 MILLIGRAM(S): at 06:40

## 2025-01-01 RX ADMIN — HEPARIN SODIUM 5000 UNIT(S): 1000 INJECTION INTRAVENOUS; SUBCUTANEOUS at 13:06

## 2025-01-01 RX ADMIN — POTASSIUM PHOSPHATE, MONOBASIC POTASSIUM PHOSPHATE, DIBASIC INJECTION, 62.5 MILLIMOLE(S): 236; 224 SOLUTION, CONCENTRATE INTRAVENOUS at 06:44

## 2025-01-01 RX ADMIN — SODIUM CHLORIDE 50 MILLILITER(S): 9 INJECTION, SOLUTION INTRAVENOUS at 07:22

## 2025-01-01 RX ADMIN — SODIUM CHLORIDE 70 MILLILITER(S): 9 INJECTION, SOLUTION INTRAVENOUS at 18:37

## 2025-01-01 RX ADMIN — Medication 63.75 MILLIMOLE(S): at 14:13

## 2025-01-01 RX ADMIN — Medication 0.5 MILLIGRAM(S): at 04:27

## 2025-01-01 RX ADMIN — HEPARIN SODIUM 5000 UNIT(S): 1000 INJECTION INTRAVENOUS; SUBCUTANEOUS at 06:39

## 2025-01-01 RX ADMIN — NOREPINEPHRINE BITARTRATE 5.91 MICROGRAM(S)/KG/MIN: 8 SOLUTION at 03:00

## 2025-01-01 RX ADMIN — Medication 50 MILLIGRAM(S): at 14:13

## 2025-01-01 RX ADMIN — LATANOPROST PF 1 DROP(S): 0.05 SOLUTION/ DROPS OPHTHALMIC at 22:10

## 2025-01-01 RX ADMIN — Medication 25 GRAM(S): at 13:45

## 2025-01-01 RX ADMIN — Medication 1 APPLICATION(S): at 12:21

## 2025-01-01 RX ADMIN — CEFEPIME 100 MILLIGRAM(S): 2 INJECTION, POWDER, FOR SOLUTION INTRAVENOUS at 06:36

## 2025-01-01 RX ADMIN — Medication 25 GRAM(S): at 22:00

## 2025-01-01 RX ADMIN — Medication 25 GRAM(S): at 14:13

## 2025-01-01 RX ADMIN — CEFEPIME 100 MILLIGRAM(S): 2 INJECTION, POWDER, FOR SOLUTION INTRAVENOUS at 22:16

## 2025-01-01 RX ADMIN — TIMOLOL MALEATE 1 DROP(S): 6.8 SOLUTION OPHTHALMIC at 13:46

## 2025-01-01 RX ADMIN — HEPARIN SODIUM 5000 UNIT(S): 1000 INJECTION INTRAVENOUS; SUBCUTANEOUS at 05:26

## 2025-01-01 RX ADMIN — LATANOPROST PF 1 DROP(S): 0.05 SOLUTION/ DROPS OPHTHALMIC at 21:50

## 2025-01-01 RX ADMIN — Medication 25 GRAM(S): at 22:33

## 2025-01-01 RX ADMIN — Medication 25 GRAM(S): at 13:23

## 2025-01-01 RX ADMIN — Medication 0.5 MILLIGRAM(S): at 07:35

## 2025-01-01 RX ADMIN — Medication 25 GRAM(S): at 05:44

## 2025-01-01 RX ADMIN — Medication 1 APPLICATION(S): at 12:27

## 2025-01-01 RX ADMIN — HEPARIN SODIUM 5000 UNIT(S): 1000 INJECTION INTRAVENOUS; SUBCUTANEOUS at 13:24

## 2025-01-01 RX ADMIN — Medication 1 MILLIGRAM(S): at 23:32

## 2025-01-01 RX ADMIN — Medication 0.5 MILLIGRAM(S): at 04:45

## 2025-01-01 RX ADMIN — HEPARIN SODIUM 5000 UNIT(S): 1000 INJECTION INTRAVENOUS; SUBCUTANEOUS at 13:44

## 2025-01-01 RX ADMIN — Medication 0.5 MILLIGRAM(S): at 07:21

## 2025-01-01 RX ADMIN — HEPARIN SODIUM 5000 UNIT(S): 1000 INJECTION INTRAVENOUS; SUBCUTANEOUS at 05:44

## 2025-01-01 RX ADMIN — LATANOPROST PF 1 DROP(S): 0.05 SOLUTION/ DROPS OPHTHALMIC at 22:34

## 2025-01-01 RX ADMIN — SODIUM CHLORIDE 70 MILLILITER(S): 9 INJECTION, SOLUTION INTRAVENOUS at 22:01

## 2025-01-01 RX ADMIN — Medication 0.5 MILLIGRAM(S): at 15:35

## 2025-01-01 RX ADMIN — Medication 75 MICROGRAM(S): at 22:00

## 2025-01-01 RX ADMIN — LATANOPROST PF 1 DROP(S): 0.05 SOLUTION/ DROPS OPHTHALMIC at 22:01

## 2025-01-01 RX ADMIN — SODIUM CHLORIDE 70 MILLILITER(S): 9 INJECTION, SOLUTION INTRAVENOUS at 07:30

## 2025-01-01 RX ADMIN — Medication 100 MICROGRAM(S): at 23:35

## 2025-01-01 RX ADMIN — NOREPINEPHRINE BITARTRATE 5.91 MICROGRAM(S)/KG/MIN: 8 SOLUTION at 16:28

## 2025-01-01 RX ADMIN — Medication 75 MICROGRAM(S): at 22:33

## 2025-01-01 RX ADMIN — Medication 1 MILLIGRAM(S): at 23:15

## 2025-01-01 RX ADMIN — GLYCOPYRROLATE 0.2 MILLIGRAM(S): 0.2 INJECTION INTRAMUSCULAR; INTRAVENOUS at 15:26

## 2025-01-16 NOTE — DISCHARGE NOTE PROVIDER - NSDCHHCONTACT_GEN_ALL_CORE_FT
Please let patient know labs ordered please fast for 10 hours but he could drink as much water as he wishes during the fast. As certified below, I, or a nurse practitioner or physician assistant working with me, had a face-to-face encounter that meets the physician face-to-face encounter requirements.

## 2025-04-30 NOTE — ED PROVIDER NOTE - CARE PLAN
Principal Discharge DX:	AMS (altered mental status)   1 Principal Discharge DX:	AMS (altered mental status)  Secondary Diagnosis:	Gram-negative sepsis, unspecified  Secondary Diagnosis:	Severe hypothyroidism  Secondary Diagnosis:	Bradycardia, severe sinus

## 2025-04-30 NOTE — ED ADULT NURSE NOTE - NSFALLHARMRISKINTERV_ED_ALL_ED
Assistance OOB with selected safe patient handling equipment if applicable/Assistance with ambulation/Communicate risk of Fall with Harm to all staff, patient, and family/Monitor gait and stability/Monitor for mental status changes and reorient to person, place, and time, as needed/Provide visual cue: red socks, yellow wristband, yellow gown, etc/Reinforce activity limits and safety measures with patient and family/Toileting schedule using arm’s reach rule for commode and bathroom/Use of alarms - bed, stretcher, chair and/or video monitoring/Bed in lowest position, wheels locked, appropriate side rails in place/Call bell, personal items and telephone in reach/Instruct patient to call for assistance before getting out of bed/chair/stretcher/Non-slip footwear applied when patient is off stretcher/[place] to call system/Physically safe environment - no spills, clutter or unnecessary equipment/Purposeful Proactive Rounding/Room/bathroom lighting operational, light cord in reach

## 2025-04-30 NOTE — ED ADULT NURSE REASSESSMENT NOTE - NS ED NURSE REASSESS COMMENT FT1
Pt handoff received from float RN. Pt awake and responsive to verbal and tactile stimuli. On tele, bradycardic to 30's, pt on zoll. Temperature sensing choe in place, hypothermic, areli hugger ongoing. Breathing is even and unlabored on 2L o2 via NC. 2nd connor placed to left forearm, 20g. Pending dispo. Safety maintained.

## 2025-04-30 NOTE — ED ADULT NURSE NOTE - CHIEF COMPLAINT QUOTE
Pt sent from [facility] for AMS today. Per family went to visit at 3pm and she was altered and had slurred speech. Per EMS, facility states LKN was possibly 1400hrs. Pt lethargic, unable to complete neuro exam.  Unable to get temp. Hx: HTN, HLD, Bed bound

## 2025-04-30 NOTE — ED ADULT TRIAGE NOTE - CHIEF COMPLAINT QUOTE
Pt sent from [facility] for AMS today. Per family went to visit at 3pm and she was altered and had slurred speech. Pt lethargic, unable to complete neuro exam.  Unable to get temp. Hx: HTN, HLD, Bed bound Pt sent from [facility] for AMS today. Per family went to visit at 3pm and she was altered and had slurred speech. Per EMS, facility states LKN was possibly 1400hrs. Pt lethargic, unable to complete neuro exam.  Unable to get temp. Hx: HTN, HLD, Bed bound

## 2025-04-30 NOTE — ED ADULT NURSE NOTE - OBJECTIVE STATEMENT
Patient brought directly to CT for code stroke. Patient from [facility]. Patient is AOx0, bedbound at baseline, able to speak in full sentences, c/o AMS. Patient has a past medical history of HTN, HLD, ROSANA, thyroid disease and stage 2 on sacrum.  Per family, she went to visit at 3pm and she was altered and had slurred speech and was rolling her eyes back; no associated shaking or jerking movements. Patient brought back to Trauma room C, patient noted to bradycardic on cardiac monitor; MD Monae made aware, patient placed on zoll pads. 16F choe placed using sterile technique; light pink tinged urine noted. 20G placed in RAC; labs drawn and sent. Urine drawn and sent. Patient placed on hypothermic blanket for hypothermia, family at bedside educated on hypothermia and need for warming blanket; internal thermometer in place. Patient has become more lethargic from CT to trauma room C; MD Monae made aware, pending patient POC. VS noted. Respirations even and unlabored, chest rise symmetrical b/l. Stage 2 noted to sacrum; no drainage or blood noted; gauze in place. Comfort measures maintained. Bed in lowest position. Safety maintained.

## 2025-04-30 NOTE — ED PROVIDER NOTE - ATTENDING CONTRIBUTION TO CARE
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  EMS originally got a call because granddaughter visited the patient today and noted that she was altered from her baseline which was reported to be ANO x 4.  Brought in for failure to thrive.  Vitals were reported to be normal except for bradycardia which EMS reported was patient's baseline 40s to 50s.  Patient is on 25 mcg of levothyroxine.  Daughter noted altered mental status at around 3:30 PM today and reported right-sided weakness and numbness.  Bradycardia blood pressure was 150/70.  Oxygen 95% on room air.  Fingerstick was 176.  Denies recent fever, chills, change in mental status but there is no reliable collateral information from nursing home.  EMS reports that they did not know anything "  INDEPENDENT FINDINGS: None   Vital Signs Last 24 Hrs  T(F): 91 HR: 40 BP: 149/75 BP(mean): 94 RR: 16 SpO2: 100% (2025 20:53) (100% - 100%)  PE: as described; Gen: lethargic arousable to loud verbal follows simple commands   bradycardic rate reg rhythm, normal S1S2  Chest: CTA BL, unlabored breathing  Abdomen: normal BS, soft, NT  Extremity: no gross deformity, good perfusion  Skin: no rash    DATA:  EKG: pending at time of evaluation  LAB: Blood Gas Venous - Lactate: 1.1 mmol/L (25 @ 22:11)  Blood Gas Venous - Lactate: 1.4 mmol/L (25 @ 21:06)                        11.7   3.73  )-----------( 128      ( 2025 21:06 )             37.2   Mean Cell Volume: 84.9 fL (25 @ 21:06)    PT: 10.0 sec;   INR: <0.90 ratio    PTT:37.8 qme78-10    144  |  109[H]  |  36[H]  ----------------------------<  138[H]  5.3   |  28  |  0.74    Ca    10.0      2025 21:06  Phos  2.0      Mg     2.20       TPro  7.3  /  Alb  3.3  /  TBili  <0.2  /  DBili  x   /  AST  167[H]  /  ALT  343[H]  /  AlkPhos  146[H]       Troponin T, High Sensitivity Result: 35 ng/L (25 @ 21:06)  Urinalysis Basic - ( 2025 21:49 )  Color: Red / Appearance: Cloudy / S.018 / pH: x  Gluc: x / Ketone: Negative mg/dL  / Bili: Negative / Urobili: 0.2 mg/dL   Blood: x / Protein: 100 mg/dL / Nitrite: Negative   Leuk Esterase: Large / RBC: x / WBC x   Sq Epi: x / Non Sq Epi: x / Bacteria: x       IMPRESSION/RISK:  Dx= G-sepsis leading to decompensated Hypothyroidism and ams   Consideration include will need endocrine supplementation 2/2 sepsis; Burton and respi acidosis 2/2 muscle weakness and is probably chronic given HCO3  Plan  hydrocortisone sodium succinate Injectable 100 milliGRAM(s) IV Push  levothyroxine Injectable 100 MICROGram(s) IV Push  vancomycin  IVPB. 1000 milliGRAM(s) IV Intermittent/cefipime  Unit and Endocrine aware   Stroke w/u so far negative; (primarily called to r/o bleed)   Will reasssess  Dispo definitely admit; setting to be determined by reccs/course

## 2025-04-30 NOTE — ED ADULT TRIAGE NOTE - SPO2 (%)
12/2023  Last Colorectal Cancer Screening: 3/2023  100

## 2025-04-30 NOTE — ED PROVIDER NOTE - PROGRESS NOTE DETAILS
Icu and ccm consulted; plan is for antibiotics given patients body habitus would administer thyroxine after adding on formal tfts and cortisol would also give dex (for concomitant adrenal insufficiency/Schmidts syndrome. Await unit input will reasssess

## 2025-04-30 NOTE — ED PROVIDER NOTE - CLINICAL SUMMARY MEDICAL DECISION MAKING FREE TEXT BOX
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  EMS originally got a call because granddaughter visited the patient today and noted that she was altered from her baseline which was reported to be ANO x 4.  Brought in for failure to thrive.  Vitals were reported to be normal except for bradycardia which EMS reported was patient's baseline 40s to 50s.  Patient is on 25 mcg of levothyroxine.  Daughter noted altered mental status at around 3:30 PM today and reported right-sided weakness and numbness.  Bradycardia blood pressure was 150/70.  Oxygen 95% on room air.  Fingerstick was 176.  Denies recent fever, chills, change in mental status but there is no reliable collateral information from nursing home.  EMS reports that they did not know anything.      Hypothermic 89F  Gen: ill appearing, awake, alert, confused  HEENT: no conjunctivitis, PERRL  Cardiac: bradycardic rate reg rhythm, normal S1S2  Chest: CTA BL, unlabored breathing  Abdomen: normal BS, soft, NT  Extremity: no gross deformity, good perfusion  Skin: no rash  Neuro: A&Ox0, wiggling toes when asked to, not squeezing hands, not following some commands, PERRL, awake    C/f sepsis and possible metabolic encephalopathy and FTT. Will check sepsis workup, [facility] imaging. Dispo likely admit

## 2025-04-30 NOTE — CHART NOTE - NSCHARTNOTEFT_GEN_A_CORE
Endocrine consulted for hypothyroidism    89F with hx of hypothyroidism presenting as code stroke. TSH resulted as 56.29, no free T4 yet checked.    Patient was previously on LT4 112mcg daily as of 6/2024 (TSH was 0.357 6/11/2025)  She is currently taking 25mcg daily.  Per ED, patient is arousable and oriented to self and place.  Bradycardic to 40s, BP stable.    Vital Signs Last 24 Hrs  T(C): 32.8 (30 Apr 2025 20:53), Max: 32.8 (30 Apr 2025 20:53)  T(F): 91 (30 Apr 2025 20:53), Max: 91 (30 Apr 2025 20:53)  HR: 40 (30 Apr 2025 20:53) (40 - 41)  BP: 149/75 (30 Apr 2025 20:53) (149/75 - 155/89)  BP(mean): 94 (30 Apr 2025 20:53) (94 - 94)  RR: 16 (30 Apr 2025 20:53) (12 - 16)  SpO2: 100% (30 Apr 2025 20:53) (100% - 100%)    Preliminary recommendations:  - Given she is able to respond to questioning and is oriented x2, lower suspicion for myxedema coma, however she likely has severe hypothyroidism  - No weight has been obtained but ED team believes she is about 90kg. Recommend IV levothyroxine 80mcg stat and qdaily   - Check Free T4 and Total T3 levels    Full consult to follow tomorrow AM    Siddhartha Cortez MD  Endocrine Fellow  Can be reached via teams. For follow up questions, discharge recommendations, or new consults, please call answering service at [phone] (weekdays); [phone] (nights/weekends). Endocrine consulted for hypothyroidism    89F with hx of hypothyroidism presenting as code stroke. TSH resulted as 56.29, no free T4 yet checked.    Patient was previously on LT4 112mcg daily as of 6/2024 (TSH was 0.357 6/11/2025)  She is currently taking 25mcg daily.  Per ED, patient is currently arousable and oriented to self and place.  Bradycardic to 40s, BP stable.    Vital Signs Last 24 Hrs  T(C): 32.8 (30 Apr 2025 20:53), Max: 32.8 (30 Apr 2025 20:53)  T(F): 91 (30 Apr 2025 20:53), Max: 91 (30 Apr 2025 20:53)  HR: 40 (30 Apr 2025 20:53) (40 - 41)  BP: 149/75 (30 Apr 2025 20:53) (149/75 - 155/89)  BP(mean): 94 (30 Apr 2025 20:53) (94 - 94)  RR: 16 (30 Apr 2025 20:53) (12 - 16)  SpO2: 100% (30 Apr 2025 20:53) (100% - 100%)    Preliminary recommendations:  - Given she is able to respond to questioning and is oriented x2, lower suspicion for myxedema coma, however she likely has severe hypothyroidism  - No weight has been obtained but ED team believes she is about 90kg. Recommend IV levothyroxine 80mcg stat and qdaily   - Check Free T4 and Total T3 levels    Full consult to follow tomorrow AM    Siddhartha Cortez MD  Endocrine Fellow  Can be reached via teams. For follow up questions, discharge recommendations, or new consults, please call answering service at [phone] (weekdays); [phone] (nights/weekends). Endocrine consulted for hypothyroidism    89F with hx of hypothyroidism presenting as code stroke. TSH resulted as 56.29, no free T4 yet checked.    Patient was previously on LT4 112mcg daily as of 6/2024 (TSH was 0.357 6/11/2025)  She is currently taking 25mcg daily.  Per ED, patient is currently arousable and oriented to self and place.  Bradycardic to 40s, BP stable.  No hyponatremia, hypoglycemia, hypothermia, hypoventilation has been noted.    Vital Signs Last 24 Hrs  T(C): 32.8 (30 Apr 2025 20:53), Max: 32.8 (30 Apr 2025 20:53)  T(F): 91 (30 Apr 2025 20:53), Max: 91 (30 Apr 2025 20:53)  HR: 40 (30 Apr 2025 20:53) (40 - 41)  BP: 149/75 (30 Apr 2025 20:53) (149/75 - 155/89)  BP(mean): 94 (30 Apr 2025 20:53) (94 - 94)  RR: 16 (30 Apr 2025 20:53) (12 - 16)  SpO2: 100% (30 Apr 2025 20:53) (100% - 100%)    Preliminary recommendations:  - Given she is able to respond to questioning and is oriented x2, lower suspicion for myxedema coma. However she likely has severe hypothyroidism. No weight has been obtained but ED team believes she is about 90kg. Recommend IV levothyroxine 80mcg stat and qdaily.  - Check Free T4 and Total T3 levels    Full consult to follow tomorrow AM    Siddhartha Cortez MD  Endocrine Fellow  Can be reached via teams. For follow up questions, discharge recommendations, or new consults, please call answering service at [phone] (weekdays); [phone] (nights/weekends). Endocrine consulted for hypothyroidism    89F with hx of hypothyroidism presenting as code stroke. TSH resulted as 56.29, no free T4 yet checked.    Patient was previously on LT4 112mcg daily as of 6/2024 (TSH was 0.357 6/11/2025)  She is currently taking 25mcg daily.  Per ED, patient is currently arousable and oriented to self and place.  Bradycardic to 40s, BP stable.  No hyponatremia, hypoglycemia, hypothermia has been noted.    Vital Signs Last 24 Hrs  T(C): 32.8 (30 Apr 2025 20:53), Max: 32.8 (30 Apr 2025 20:53)  T(F): 91 (30 Apr 2025 20:53), Max: 91 (30 Apr 2025 20:53)  HR: 40 (30 Apr 2025 20:53) (40 - 41)  BP: 149/75 (30 Apr 2025 20:53) (149/75 - 155/89)  BP(mean): 94 (30 Apr 2025 20:53) (94 - 94)  RR: 16 (30 Apr 2025 20:53) (12 - 16)  SpO2: 100% (30 Apr 2025 20:53) (100% - 100%)    Preliminary recommendations:  - Given she is able to respond to questioning and is oriented x2, lower suspicion for myxedema coma. However she likely has severe hypothyroidism. No weight has been obtained but ED team believes she is about 90kg. Recommend IV levothyroxine 80mcg stat and qdaily.  - Check Free T4 and Total T3 levels    Full consult to follow tomorrow AM    Siddhartha Cortez MD  Endocrine Fellow  Can be reached via teams. For follow up questions, discharge recommendations, or new consults, please call answering service at [phone] (weekdays); [phone] (nights/weekends). Endocrine consulted for hypothyroidism    89F with hx of hypothyroidism presenting as code stroke. TSH resulted as 56.29, no free T4 yet checked.    Patient was previously on LT4 112mcg daily as of 6/2024 (TSH was 0.357 6/11/2025)  She is currently taking 25mcg daily.  Per ED, patient is currently arousable and oriented to self and place.  Bradycardic to 40s, BP stable.  No hyponatremia, hypoglycemia has been noted.    Vital Signs Last 24 Hrs  T(C): 32.8 (30 Apr 2025 20:53), Max: 32.8 (30 Apr 2025 20:53)  T(F): 91 (30 Apr 2025 20:53), Max: 91 (30 Apr 2025 20:53)  HR: 40 (30 Apr 2025 20:53) (40 - 41)  BP: 149/75 (30 Apr 2025 20:53) (149/75 - 155/89)  BP(mean): 94 (30 Apr 2025 20:53) (94 - 94)  RR: 16 (30 Apr 2025 20:53) (12 - 16)  SpO2: 100% (30 Apr 2025 20:53) (100% - 100%)    Preliminary recommendations:  - Given she is able to respond to questioning and is oriented x2, lower suspicion for myxedema coma. However she likely has severe hypothyroidism. No weight has been obtained but ED team believes she is about 90kg. Recommend IV levothyroxine 80mcg stat and qdaily.  - Check Free T4 and Total T3 levels    Full consult to follow tomorrow AM    Siddhartha Cortez MD  Endocrine Fellow  Can be reached via teams. For follow up questions, discharge recommendations, or new consults, please call answering service at [phone] (weekdays); [phone] (nights/weekends). Endocrine consulted for hypothyroidism    89F with hx of hypothyroidism presenting as code stroke. TSH resulted as 56.29, no free T4 yet checked.    Patient was previously on LT4 112mcg daily as of 6/2024 (TSH was 0.357 6/11/2025)  She is currently taking 25mcg daily.  Per ED, patient is currently arousable and oriented to self and place.  Bradycardic to 40s, BP stable.  No hyponatremia, hypoglycemia has been noted.    Vital Signs Last 24 Hrs  T(C): 32.8 (30 Apr 2025 20:53), Max: 32.8 (30 Apr 2025 20:53)  T(F): 91 (30 Apr 2025 20:53), Max: 91 (30 Apr 2025 20:53)  HR: 40 (30 Apr 2025 20:53) (40 - 41)  BP: 149/75 (30 Apr 2025 20:53) (149/75 - 155/89)  BP(mean): 94 (30 Apr 2025 20:53) (94 - 94)  RR: 16 (30 Apr 2025 20:53) (12 - 16)  SpO2: 100% (30 Apr 2025 20:53) (100% - 100%)    Preliminary recommendations:  - Given she is able to respond to questioning and is oriented x2, lower suspicion for myxedema coma. However she does have hypothermia and acidosis on VBG. No weight has been obtained but ED team believes she is about 90kg. Can empirically treat with IV hydrocortisone 50mg stat and q8h. Followed by IV levothyroxine 100mcg stat.  - Please add on cortisol and free T4 to labs that were obtained.  - Also check total t3    Full consult to follow tomorrow AM    Siddhartha Cortez MD  Endocrine Fellow  Can be reached via teams. For follow up questions, discharge recommendations, or new consults, please call answering service at [phone] (weekdays); [phone] (nights/weekends).

## 2025-04-30 NOTE — ED PROVIDER NOTE - DISPOSITION TYPE
HPI    2 weeks SHARON OD  Last edited by Simran Turner MA on 3/27/2024  8:56 AM.            Assessment /Plan     For exam results, see Encounter Report.    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 Stable PDR with mac edema OU  - seen by Dr. Hernández and referred here  - Last A1c in Chart:No results found for: HGBA1C               - per patient a1c ~9, , DM2 for since early 2000s  - PRP OS: 12/29/21, 1/4/23   - PRP OD: 1/26/2022, 12/14/22   - Most recent YAIR OD: 3/23/22, 4/27/22, 6/22/22  - Most recent YAIR OS: 3/2/22, 4/6/22, 5/11/22.  - Most recent SHARON OD: 7/27/22, 8/24/22, 9/28/22, 11/30/22,  current series:  4/5/2023, 5/24/23, 7/19/23, 8/23/23  - Most recent SHARON OS: 7/15/22, 8/10/22, 9/7/22, 11/16/22,     current series:  5/11/2023, 6/28/23, 8/16/23  - 3/10/23: reviewed IVFA with Dr. Hernández. 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 Few areas of RANDI and venous beading visible OU. No obvious NVE; however, IVFA does not show areas farther in periphery. Can try additional injections again to see if vision truly does not improve beyond 20/200, though it is unlikely that macula will become completely dry.   - Patient completed series of 3 Eylea OD but still with significant IRF on 8/16/23 which is even worse on 8/23/23, however much better response OS. S/p SHARON #4 8/23/23. Plan for retina eval/ possible Ozurdex in [place] 10/25/23.  - 10/25/23:  "with Dr. Schwartz in  retina clinic: It is our opinion that the vision in either eye is limited by macular ischemia and the presence of chronic edema. The left eye FC is decent and with recent CEIOL this supports ischemic argument. Either eye is a candidate for Ozurdex given limited injection responses although the right eye may be first target instead of left. Will start PF QID OU and flurbiprofen (has from post-op) QID OU to asses for steroid response and edema response. At that time instructed to RTC 1mo for repeat DFE, OCTm and consideration ozurdex.  - 01/31/2024: patient lost to follow up. Reports that she was only using a pink top four times daily and a green top four times daily for three weeks after 10/24/2023, and then was "told to stop". No documentation of ophthalmology visits after 10/25/23, no showed on 12/01/23. Patient reports forgetting her appointment. Adamant that she has not used drops for at least two months in either eye.  - 3/7/24: now after NVG resolved OS; still with stable 20/200 vision and slightly worsened edema with atrophy.  Will send back to  within 1mo for f/u as planned prior to LTFU. RTC here 2 weeks for SHARON OD as nothing has been injected and she has worsening edema and we will prophylax against NVG as has happened in the left. Continue Cosopt BID OU  - 3/27/24: No NV on exam. Will hold off injection at this time OU. Will arrange to have follow up in [place] for ozurdex and evaluation for possible PRP to prevent NV versus if we need to keep doing serial injections while severe ME.        3. S/p CEIOL OS  - DOS: 9/21/23  - POM1 VA 20/200, no improvement with refraction, same as pre-op VA.   - off drops     4. Combined cataract OD  - likely VS however given severe DME will wait until better control prior to attempting surgery   ------------------------------------      Refer back to  retina 1-2 weeks  RTC 1 mo DFE, OCT mac, possible IV either eye  " ADMIT

## 2025-04-30 NOTE — CONSULT NOTE ADULT - ASSESSMENT
John, 89F with PMH hypothyroidism, HTN, HLD, mild cognitive decline, glaucoma (blind L eye), h/o stage 4/5 decubitus ulcer, tobacco use disorder with CC AMS after presenting hypothermic, bradycardic, with markedly elevated TSH (56.29), ROSANA, and transaminitis. Being admitted to medicine for severe hypothyroidism.    #Severe Hypothyroidism  AMS, hypothermia (32.8 C), bradycardia (40s), elevated TSH (56.29), acidosis after reported decrease in levothyroxine dose from 112mcg to 25mcg. Endocrine consulted, do not believe it is myxedema coma at tihs time given mental status, though MICU personnel evaluated after hydrocortisone shot. Possibly with infectious etiology - UA dirty.   - Continue IV Hydrocortisone 50mg q8h pending AM cortisol level.  - Administered IV Levothyroxine 100mcg STAT per Endocrine. Await fT4, T3 levels. Follow Endocrine recommendations for further thyroid hormone replacement.  - Continuous cardiac monitoring.  - Follow VBG/lactate for acidosis resolution.  - Await swallow evaluation (dysphagia screen ordered). Maintain NPO.  - Repeat UA, f/u full infectious workup    #Glaucoma / Visual Impairment  PMH glaucoma, legally blind L eye.   - Continue home eye drops as prescribed.    #History of Stage 4/5 Sacral Ulcer  Per family report, unclear current status.  - Requires full skin assessment upon admission.  - Ensure appropriate pressure-reducing surface and turning schedule if applicable based on assessment.    Currently not MICU candidate at this time. Please reconsult as needed.     Yves Christie MD  Recs not finalized until attending attestation present.

## 2025-04-30 NOTE — ED PROVIDER NOTE - NS ED ATTENDING STATEMENT MOD
Unknown I have personally provided the amount of critical care time documented below concurrently with the resident/fellow.  This time excludes time spent on separate procedures and time spent teaching. I have reviewed the resident’s / fellow’s documentation and I agree with the history, exam, and assessment and plan of care.

## 2025-05-01 NOTE — PROGRESS NOTE ADULT - PROBLEM SELECTOR PLAN 3
:: PMH glaucoma, legally blind L eye  -Continue home eye drops as prescribed - PMH glaucoma, legally blind L eye  - Continue home eye drops as prescribed - Initial VBG with pCO2 73, pH 7.27, HCO3 34, consistent with respiratory acidosis  - Repeat VBG after IV synthroid and steroids with pCO2 58, PH 7.31, HCO3 29   - Concerning for depressed respiratory drive in setting of severe hypothyroidism vs myxedema coma     Plan:   - Repeat ABG today  - Monitor respiratory status  - Supplemental O2   - Hypothyroidism/myxedema coma work up as above

## 2025-05-01 NOTE — PROGRESS NOTE ADULT - PROBLEM SELECTOR PLAN 5
-, , Alk Phos 146  -Possible i/s/o fatty liver disease  -Monitor HFP  [ ] Acute hepatitis panel -, , Alk Phos 146  -Possible i/s/o fatty liver disease  -Monitor HFP  -f/u Acute hepatitis panel -Home medication: previously on Amlodipine 2.5 mg QD   -Monitor BP closely and adjust medications if clinically indicated

## 2025-05-01 NOTE — CONSULT NOTE ADULT - SUBJECTIVE AND OBJECTIVE BOX
MICU CONSULT NOTE    CHIEF COMPLAINT: Patient is a 89y old  Female who presents with a chief complaint of     HPI:  Patient is a 89-year-old female with a medical history of hypothyroidism, HTN, HLD, mild cognitive decline, glaucoma (legally blind L eye), history of stage 4/5 decubitus ulcer (sacral, status unclear), who presented with a chief complaint of altered mental status.     The patient resides in a nursing home since 10/2024 and is non-ambulatory, requiring assistance for transfers to a recliner. Her baseline mental status involves orientation to self but not place, though she is typically conversational. She was reportedly at her baseline when seen by a cousin on day  prior to presentation. On 2025 around 16:30, family visited and noted an acute change; she did not recognize them, her speech was altered, and she followed commands inconsistently, prompting presentation to the ED. Family denied any complaints of pain or cough. She was previously documented as taking levothyroxine 112 mcg daily following a hospitalization from 2024 to 2024 (TSH 0.357 on 2024), and nursing home paperwork reports 25 mcg daily recently.    In the ED on 2025, the patient was found to be significantly hypothermic to 32.8 C, bradycardic (HR 40s), and hypertensive (/75). Labs revealed TSH 56.29, ROSANA (Cr 3.73, baseline 0.74 on 2024), transaminitis (, , Alk Phos 146), and acidosis on VBG. Endocrine was consulted for severe hypothyroidism. They noted she was arousable and oriented x2 (self, place per their note, though family reports baseline oriented x1) but recommended empiric treatment for 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 given hypothermia and acidosis. History was provided by family at bedside.     REVIEW OF SYSTEMS as noted in HPI; all else negative.     Past Medical and Surgical History  HTN (hypertension)  Hypothyroidism  COVID-19 vaccine series completed  HLD (hyperlipidemia)  No significant past surgical history      SOCIAL HISTORY:  Smoking: Significant prior smoking history no longer smoking   Substance Use: No substance use   EtOH Use: No alcohol use      Meds:   hydrocortisone sodium succinate Injectable; levothyroxine Injectable; vancomycin  IVPB.    Allergies  No Known Allergies    Intolerances      --------------------------------    OBJECTIVE:  ICU Vital Signs Last 24 Hrs  T(C): 32.8 (2025 20:53), Max: 32.8 (2025 20:53)  T(F): 91 (2025 20:53), Max: 91 (2025 20:53)  HR: 40 (2025 20:53) (40 - 41)  BP: 149/75 (2025 20:53) (149/75 - 155/89)  BP(mean): 94 (2025 20:53) (94 - 94)  ABP: --  ABP(mean): --  RR: 16 (:53) (12 - 16)  SpO2: 100% (:53) (100% - 100%)    O2 Parameters below as of 2025 20:53  Patient On (Oxygen Delivery Method): nasal cannula  O2 Flow (L/min): 4            CAPILLARY BLOOD GLUCOSE      POCT Blood Glucose.: 147 mg/dL (2025 20:38)      PHYSICAL EXAM:  GENERAL: NAD, lying in bed with bear hugger  HEAD:  Atraumatic, normocephalic  EYES: PERRLA, conjunctiva and sclera clear  NECK: Supple,   HEART: Regular rhythm, bradycardic  LUNGS: Unlabored respirations.    ABDOMEN: Soft, nontender, nondistended, +BS  EXTREMITIES: 2+ peripheral pulses bilaterally, No clubbing, cyanosis. 1+ pitting edema  NERVOUS SYSTEM:  A&Ox1,oriented to name, occasionally orinted to place though intermittent  SKIN: No rashes or lesions. Unable to examine backside for ulcerations  LINES:     LABS:                        11.7   3.73  )-----------( 128      ( 2025 21:06 )             37.2     Hgb Trend: 11.7<--  0430    144  |  109[H]  |  36[H]  ----------------------------<  138[H]  5.3   |  28  |  0.74    Ca    10.0      2025 21:06  Phos  2.0     04-30  Mg     2.20     04-30    TPro  7.3  /  Alb  3.3  /  TBili  <0.2  /  DBili  x   /  AST  167[H]  /  ALT  343[H]  /  AlkPhos  146[H]      Creatinine Trend: 0.74<--  PT/INR - ( 2025 21:06 )   PT: 10.0 sec;   INR: <0.90 ratio         PTT - ( 2025 21:06 )  PTT:37.8 sec  Urinalysis Basic - ( 2025 21:49 )    Color: Red / Appearance: Cloudy / S.018 / pH: x  Gluc: x / Ketone: Negative mg/dL  / Bili: Negative / Urobili: 0.2 mg/dL   Blood: x / Protein: 100 mg/dL / Nitrite: Negative   Leuk Esterase: Large / RBC: x / WBC x   Sq Epi: x / Non Sq Epi: x / Bacteria: x        Venous Blood Gas:   @ 22:11  --/--/--/--/--  VBG Lactate: 1.1  Venous Blood Gas:   @ 21:06  7.27/73/37/34/61.4  VBG Lactate: 1.4      MICROBIOLOGY:     RADIOLOGY & ADDITIONAL TESTS:  ------------------------------------------------------------------------------------------    
Wound SURGERY CONSULT NOTE    HPI:89F with PMH hypothyroidism, HTN, HLD, mild cognitive decline, glaucoma (blind L eye), and h/o decubitus ulcer, who presents with AMS. Patient is a resident in a nursing home since 10/2024 and is non-ambulatory. Per ED report, her baseline mental status is A&O x1 (person) but she is typically conversational. Per family, patient has had a change in mental status from her baseline as she did not recognize them, and her speech was altered. She followed commands inconsistently, which prompted presentation to the ED. Patient had a change in her dose of Synthroid from 112 mcg to 25 mcg based on nursing home paperwork. She was discharged on 112 mcg dose during hospitalization from 6/9/2024 to 6/12/2024 with a TSH of 0.357 on 6/11/2024. In the ED, patient was found to be A&O x1 (Name), Hypothermia (T 32.8C), and Bradycardia (40’s). Labs notable for TSH 56.29, transaminitis (, , Alk Phos 146), and acidosis on VBG. Patient evaluated by endocrinology and MICU and given p IV levothyroxine 100mcg and Hydrocortisone 100 mg IV for potential myxedema coma although they had low suspicion for it.    (01 May 2025 01:32)    Wound consult requested to assist w/ management of  hx of pressure injuries. Patient unable to provide subjective information due to mental status.     Current Diet: Diet, NPO (05-01-25 @ 01:32)      PAST MEDICAL & SURGICAL HISTORY:  HTN (hypertension)      Hypothyroidism      COVID-19 vaccine series completed  w booster,      HLD (hyperlipidemia)      No significant past surgical history    REVIEW OF SYSTEMS: Pt unable to offer      MEDICATIONS  (STANDING):  cefepime   IVPB 2000 milliGRAM(s) IV Intermittent every 8 hours  chlorhexidine 2% Cloths 1 Application(s) Topical daily  heparin   Injectable 5000 Unit(s) SubCutaneous every 8 hours  hydrocortisone sodium succinate Injectable 100 milliGRAM(s) IV Push every 8 hours  latanoprost 0.005% Ophthalmic Solution 1 Drop(s) Both EYES at bedtime  sodium phosphate 15 milliMole(s)/250 mL IVPB 15 milliMole(s) IV Intermittent once  timolol 0.25% Solution 1 Drop(s) Both EYES daily    MEDICATIONS  (PRN):  acetaminophen     Tablet .. 650 milliGRAM(s) Oral every 6 hours PRN Temp greater or equal to 38C (100.4F), Mild Pain (1 - 3)      Allergies    No Known Allergies    Intolerances    SOCIAL HISTORY: Per H&P, no substance use. Bedbound. NH resident.     FAMILY HISTORY:      PHYSICAL EXAM:  Vital Signs Last 24 Hrs  T(C): 33.9 (01 May 2025 06:30), Max: 33.9 (01 May 2025 06:30)  T(F): 93.1 (01 May 2025 06:30), Max: 93.1 (01 May 2025 06:30)  HR: 47 (01 May 2025 06:30) (40 - 47)  BP: 133/81 (01 May 2025 06:30) (133/81 - 155/89)  BP(mean): 94 (30 Apr 2025 20:53) (94 - 94)  RR: 18 (01 May 2025 06:30) (12 - 18)  SpO2: 100% (01 May 2025 06:30) (100% - 100%)    Parameters below as of 01 May 2025 06:30  Patient On (Oxygen Delivery Method): nasal cannula  O2 Flow (L/min): 2    Weight (kg): 63 (30 Apr 2025 21:58)  BMI (kg/m2): 23.8 (30 Apr 2025 21:58)      Constitutional: Withdrawn, mumbles at times when simulated with turn, eyes open but not following commands, ill appearing   frail  (+) low airloss support surface, (+) fluidized positioning devices, heels offloaded with pillows   HEENT: NC/AT, non icteric, mucosa moist, throat clear, trachea midline, neck supple  Cardiovascular: bradycardia   Respiratory: Equal cehst expansion, receiving supplemental oxygen via nasal cannula, intact posterior ears   Gastrointestinal soft NT/ND   : incontinence of urine, indwelling catheter in place, (+) hematuria   Neurology: no follow commands/ paraplegic  Musculoskeletal:  limited aROM, no deformities/ contractures  Vascular: BLE equally warm, no cyanosis, clubbing, edema. (+) hyperkeratosis to medial malleolus   DP/PT pulses non palpable  no overt  ischemia noted  Heels bogginess, no ulcers   Right bunion blanching erythema   Skin:  moist w/ good turgor  frail,  ecchymosis w/o hematoma  Sacrum and right ischium with hypopigmentation, soft tissue contracture. Xeroform and gauze removed.   Sacrum with callus skin over hypopigmentation  Incontinence and moisture associated dermatitis in bilateral buttocks, gluteal fold, perineum as evident y hyperpigmentation. Right gluteal folds (within fold) denuded epidermis measuring less than 0.5cm,  exposing pink moist dermis.     LABS/ CULTURES/ RADIOLOGY:                        10.8   3.97  )-----------( 128      ( 01 May 2025 06:00 )             33.7       143  |  110  |  35  ----------------------------<  128      [05-01-25 @ 06:00]  5.0   |  25  |  0.67        Ca     9.4     [05-01-25 @ 06:00]      Mg     2.00     [05-01-25 @ 06:00]      Phos  2.2     [05-01-25 @ 06:00]    TPro  7.3  /  Alb  3.3  /  TBili  <0.2  /  DBili  x   /  AST  167  /  ALT  343  /  AlkPhos  146  [04-30-25 @ 21:06]    PT/INR: PT 10.0 , INR <0.90      [04-30-25 @ 21:06]  PTT: 37.8       [04-30-25 @ 21:06]                  
Betty Humphries MD   |   PGY-4  Endocrinology Fellow  Available on [employer] Teams    HPI:  89F with PMH hypothyroidism, HTN, HLD, mild cognitive decline, glaucoma (blind L eye), and h/o decubitus ulcer, who presents with AMS. Patient is a resident in a nursing home since 10/2024 and is non-ambulatory. Per ED report, her baseline mental status is A&O x1 (person) but she is typically conversational. Per family, patient has had a change in mental status from her baseline as she did not recognize them, and her speech was altered. She followed commands inconsistently, which prompted presentation to the ED. Patient had a change in her dose of Synthroid from 112 mcg to 25 mcg based on nursing home paperwork. Family reports they were not made aware of levothyroxine dose changes. She was discharged on 112 mcg dose during hospitalization from 6/9/2024 to 6/12/2024 with a TSH of 0.357 on 6/11/2024. Per primary team, outpatient TSH around time of dose change was 56.    In the ED, patient was found to be A&O x1 (Name), hypothermic (T 32.8C), and bradycardic (40’s). Labs notable for TSH 56.29, transaminitis (, , Alk Phos 146), and acidosis on VBG. s/p IV levothyroxine 100mcg and hydrocortisone 100 mg IV for potential myxedema coma.    Endocrine consulted for further management.     Endocrine History:     Hx provided by granddaughter Cata, who is pt's primary care giver.  Granddaughter states she would normally see pt every other day in NH, with exception of recently where her cousin had been filling in for her. Says patient has never complained of any issues with the exception of feeling "tired", however granddaughter went to see pt yesterday and said "she looked like death" so she brought her to ED.     Granddaughter denies h/o radiation to head/neck, h/o thyroid US, FNA or thyroid sx (that she is aware of), h/o amiodarone or lithium use. Says there is "thyroid problems" in patient's extended family but is unaware of exact disorder. Grandaughter is unaware of exact medication doses or dose changes.      PAST MEDICAL & SURGICAL HISTORY:  HTN (hypertension)      Hypothyroidism      COVID-19 vaccine series completed  w booster,      HLD (hyperlipidemia)      No significant past surgical history          FAMILY HISTORY:  "Thyroid problems" in extended family    SOCIAL HISTORY:  Lives in nursing home      MEDICATIONS  (STANDING):  chlorhexidine 2% Cloths 1 Application(s) Topical daily  heparin   Injectable 5000 Unit(s) SubCutaneous every 8 hours  latanoprost 0.005% Ophthalmic Solution 1 Drop(s) Both EYES at bedtime  piperacillin/tazobactam IVPB.. 3.375 Gram(s) IV Intermittent every 8 hours  timolol 0.25% Solution 1 Drop(s) Both EYES daily    MEDICATIONS  (PRN):  acetaminophen     Tablet .. 650 milliGRAM(s) Oral every 6 hours PRN Temp greater or equal to 38C (100.4F), Mild Pain (1 - 3)      Allergies    No Known Allergies    Intolerances      Review of Systems:  Unable to obtain due to AMS, nonverbal at time of evaluation    ===================PHYSICAL EXAM=======================  VITALS: T(C): 34.8 (05-01-25 @ 12:00)  T(F): 94.7 (05-01-25 @ 12:00), Max: 94.7 (05-01-25 @ 12:00)  HR: 61 (05-01-25 @ 12:00) (40 - 61)  BP: 136/95 (05-01-25 @ 12:00) (133/81 - 155/89)  RR:  (12 - 18)  SpO2:  (98% - 100%)  Wt(kg): --  GENERAL: NAD  EYES: No proptosis, no lid lag, anicteric  THYROID: Normal size, no palpable nodules  RESPIRATORY: Stertor on exam  CARDIOVASCULAR: Regular rate and rhythm  GI: Soft, nontender, non distended  EXT: b/l feet without wounds, 2+ pulses, no edema  PSYCH: Awake, noninteractive  ======================================================  POCT Blood Glucose.: 150 mg/dL (05-01-25 @ 16:48)  POCT Blood Glucose.: 125 mg/dL (05-01-25 @ 12:11)  POCT Blood Glucose.: 145 mg/dL (05-01-25 @ 06:51)  POCT Blood Glucose.: 147 mg/dL (04-30-25 @ 20:38)                            10.8   3.97  )-----------( 128      ( 01 May 2025 06:00 )             33.7       05-01    143  |  110[H]  |  35[H]  ----------------------------<  128[H]  5.0   |  25  |  0.67    eGFR: 82    Ca    9.4      05-01  Mg     2.00     05-01  Phos  2.2     05-01    TPro  7.3  /  Alb  3.3  /  TBili  <0.2  /  DBili  x   /  AST  167[H]  /  ALT  343[H]  /  AlkPhos  146[H]  04-30      Thyroid Function Tests:  05-01 @ 06:00 TSH 49.13 FreeT4 <0.3 T3 -- Anti TPO 10.8 Anti Thyroglobulin Ab -- TSI --  04-30 @ 21:06 TSH 56.29 FreeT4 -- T3 <30 Anti TPO -- Anti Thyroglobulin Ab -- TSI --              Radiology:

## 2025-05-01 NOTE — H&P ADULT - PROBLEM SELECTOR PLAN 2
::Possible Urosepsis   -T 32.8C, HR 40, /75, RR 16, SpO2 100% on RA  -WBC 3.73, Lactate 1.4 -> 1.1, pCO2 73, pH 7.27, HCO3 34, CRP 11.6,   -CXR: No focal consolidations.  -CTH & CTA Head & Neck: No acute intracranial hemorrhage, mass effect, or midline shift. No large vessel occlusion, significant stenosis or vascular abnormality   identified.  -ECG: Sinus bradycardia with 1st degree A-V block.    -UA: Cloudy, Large LE. Neg Nitrite, 92 WBC, 1834 RBC  -Vitals Q4H. Continuous pulse oximetry. Maintain spO2 >94%.  -s/p Vanc/Zosyn in ED. Continue w/ Cefepime for UTI pending cultures and workup   -Fluids: s/p 1L NS bolus   [ ] RVP, Procalcitonin, Blood cultures, Urine cultures

## 2025-05-01 NOTE — CONSULT NOTE ADULT - ASSESSMENT
Assessment/Plan: 89F with PMH hypothyroidism, HTN, HLD, mild cognitive decline, glaucoma (blind L eye), and h/o decubitus ulcer, who presents with AMS and found to have severe hypothyroidism w/ possible UTI. Admitted to medicine for further management and monitoring. Endo consulted for 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. MICU evaluated, not a MICU candidate at this time. S/p IV levothyroxine 100mcg and Hydrocortisone 100 mg IV.     Wound Consult requested to assist w/ management of  Sacral wound present on admision   -On exam, no open wounds noted. Evidence of healed pressure injuries in sacrum and right ischium. Right side of gluteal cleft denuded epidermis appears to be from moisture/incontinence   Patient incontinence of urine and stool, indwelling catheter in place, no stool during skin assessment, consider placing external fecal  if loose stools occur. Change fecal  as per hospital protocol     Additional findings/recs   No DP/PT pulses palpable, no concerns for limb ischemia , if aligned with GOC consider FADI/PVR, if abnormal recommend vascular consult   Consider Palliative consult to discuss GOC   Patient NPO, pending dietician consult to optimize when feasible   Continue turning and positioning w/ offloading assistive devices as per protocol  Continue w/ Pericare as per protocol  Continue use of single breathable pad for moisture control   Continue w/ low air loss fluidized bed surface while in the hospital   Right bunion and bilateral heels, apply liquid barrier film daily, cover with ABD pad and Kerlix. Change daily.   Offload heels with complete cair boots     Care as per medicine, Wound care surgery will sign off at this time, please reconsult if needed    If desire, Upon discharge f/u as outpatient at [facility] [address] [place], [place] [phone]  D/w team rafal Woods     Thank you for this consult  Alejandra Cook, ASHWINI-BC, EMMA (pager #[phone] or available in MS teams)    If after 4PM or before 7:30AM on Mon-Friday or weekend/holiday please contact general surgery for urgent matters.   Team A- [id number]   Team B- [id number]  For non-urgent matters e-mail [email or web address]    I  spent 55 minutes face to face with this patient of which more than 50% of the time was spent counseling & coordinating care of this pt Assessment/Plan: 89F with PMH hypothyroidism, HTN, HLD, mild cognitive decline, glaucoma (blind L eye), and h/o decubitus ulcer, who presents with AMS and found to have severe hypothyroidism w/ possible UTI. Admitted to medicine for further management and monitoring. Endo consulted for 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. MICU evaluated, not a MICU candidate at this time. S/p IV levothyroxine 100mcg and Hydrocortisone 100 mg IV.     Wound Consult requested to assist w/ management of  Sacral wound present on admision   -On exam, no open wounds noted. Evidence of healed pressure injuries in sacrum and right ischium. Right side of gluteal cleft denuded epidermis appears to be from moisture/incontinence   Patient incontinence of urine and stool, indwelling catheter in place, no stool during skin assessment, consider placing external fecal  if loose stools occur. Change fecal  as per hospital protocol   .Topical recommendations: Sacrum, bilateral buttocks, perineum and ischiums: Clean with skin cleanser, pat dry. Apply Enedina barrier moisture cream to entire area. Apply twice a day or prn if soiled.     Additional findings/recs   Protect posterior ears from nasal cannula tubing with offloading device (Posey)  No DP/PT pulses palpable, no concerns for limb ischemia , if aligned with GOC consider FADI/PVR, if abnormal recommend vascular consult   Consider Palliative consult to discuss GOC   Patient NPO, pending dietician consult to optimize when feasible   Continue turning and positioning w/ offloading assistive devices as per protocol  Continue w/ Pericare as per protocol  Continue use of single breathable pad for moisture control   Continue w/ low air loss fluidized bed surface while in the hospital   Right bunion and bilateral heels, apply liquid barrier film daily, cover with ABD pad and Kerlix. Change daily.   Offload heels with complete cair boots     Care as per medicine, Wound care surgery will sign off at this time, please reconsult if needed    If desire, Upon discharge f/u as outpatient at [facility] [address] [place] [address] [phone]  D/w team rafal Woods     Thank you for this consult  KAZ Walsh, EMMA (pager #[phone] or available in MS teams)    If after 4PM or before 7:30AM on Mon-Friday or weekend/holiday please contact general surgery for urgent matters.   Team A- [id number]   Team B- [id number]  For non-urgent matters e-mail [email or web address]    I  spent 55 minutes face to face with this patient of which more than 50% of the time was spent counseling & coordinating care of this pt

## 2025-05-01 NOTE — H&P ADULT - ASSESSMENT
89F with PMH hypothyroidism, HTN, HLD, mild cognitive decline, glaucoma (blind L eye), and h/o decubitus ulcer, who presents with AMS and found to have severe hypothyroidism w/ possible UTI. Admitted to medicine for further management and monitoring. Detailed plan as below:

## 2025-05-01 NOTE — H&P ADULT - NSHPPHYSICALEXAM_GEN_ALL_CORE
- GENERAL: Alert and oriented x 1. Ill appearing   - EYES: EOMI. Anicteric.  - HENT: No scleral icterus.  - LUNGS: Clear to auscultation bilaterally. No accessory muscle use.  - CARDIOVASCULAR: Bradycardic rate and regular rhythm. No murmur. No JVD.  - ABDOMEN: Soft, non-tender and non-distended. No palpable masses.  - EXTREMITIES: No edema. Non-tender.  - SKIN: Sacral wound  - NEUROLOGIC: No focal neurological deficits. Unable to perform full assessment d/t mental status t.

## 2025-05-01 NOTE — GOALS OF CARE CONVERSATION - ADVANCED CARE PLANNING - CONVERSATION DETAILS
In brief, this is a 89 year old female with hypothyroidism, HTN, HLD, cognitive decline, glaucoma (blind L eye), who was initially admitted for myxedema coma and sepsis 2/2 to UTI.  Code blue was called on the floor, ROSC achieved after approximately 1.5 minutes of CPR, patient now admitted to MICU for post-arrest care. Pt briefly on epinephrine gtt post arrest for severe bradycardia which was weaned off. She was also placed on warming blanket and Bipap due to AMS and work of breathing with improvement of mental status. Repeat blood work was sent. Events of the code blue, current admission, diagnoses, prognoses and treatment plan discussed at length with granddaughter/HCP Cata at bedside. She would like the pt to be DNR/DNI and "would not want her to suffer", but would like to continue all other medical treatments including antibiotics, blood work and Bipap as needed. MOLST filled and placed in chart.

## 2025-05-01 NOTE — PROGRESS NOTE ADULT - PROBLEM SELECTOR PLAN 6
-Frequent repositioning and offload pressure   -Wound care consultation -, , Alk Phos 146  -Possible i/s/o fatty liver disease  -Monitor HFP  -f/u Acute hepatitis panel

## 2025-05-01 NOTE — CHART NOTE - NSCHARTNOTEFT_GEN_A_CORE
Critically ill patient requiring ABG to determine respiratory status. This was an emergent procedure. Patients radial artery was visualized with US and cleaned with alcohol pad. 23g x 3/4" x 12" butterfly needed was inserted into the left radial artery with pulsatile flash.  One attempt was performed.  3cc of blood was obtained from patient.  Gauze pad was placed over site, needle withdrawn and firm pressure was held until complete hemostasis was achieved and band-aid was applied.  Patient tolerated procedure well with no complications.

## 2025-05-01 NOTE — H&P ADULT - PROBLEM SELECTOR PLAN 4
-Home medication: previously on Amlodipine 2.5 mg QD   -Monitor BP closely and adjust medications if clinically indicated

## 2025-05-01 NOTE — PROGRESS NOTE ADULT - SUBJECTIVE AND OBJECTIVE BOX
***************************************************************  Vangie Alvarenga, PGY1  Internal Medicine   Available on [employer] TEAMS  ***************************************************************    ROMULO KOHLI  89y  MRN: [id number]    Patient is a 89y old  Female who presents with a chief complaint of AMS, Severe hypothyroidism (01 May 2025 01:32)      Interval/Overnight Events: no events ON.     Subjective: Pt seen and examined at bedside. Denies fever, CP, SOB, abn pain, N/V, dysuria. Tolerating diet.      MEDICATIONS  (STANDING):  cefepime   IVPB 2000 milliGRAM(s) IV Intermittent every 8 hours  chlorhexidine 2% Cloths 1 Application(s) Topical daily  heparin   Injectable 5000 Unit(s) SubCutaneous every 8 hours  hydrocortisone sodium succinate Injectable 100 milliGRAM(s) IV Push every 8 hours  latanoprost 0.005% Ophthalmic Solution 1 Drop(s) Both EYES at bedtime  timolol 0.25% Solution 1 Drop(s) Both EYES daily    MEDICATIONS  (PRN):  acetaminophen     Tablet .. 650 milliGRAM(s) Oral every 6 hours PRN Temp greater or equal to 38C (100.4F), Mild Pain (1 - 3)      Objective:    Vitals: Vital Signs Last 24 Hrs  T(C): 33.9 (05-01-25 @ 06:30), Max: 33.9 (05-01-25 @ 06:30)  T(F): 93.1 (05-01-25 @ 06:30), Max: 93.1 (05-01-25 @ 06:30)  HR: 47 (05-01-25 @ 06:30) (40 - 47)  BP: 133/81 (05-01-25 @ 06:30) (133/81 - 155/89)  BP(mean): 94 (04-30-25 @ 20:53) (94 - 94)  RR: 18 (05-01-25 @ 06:30) (12 - 18)  SpO2: 100% (05-01-25 @ 06:30) (100% - 100%)                I&O's Summary      PHYSICAL EXAM:  GENERAL: NAD  HEAD:  Atraumatic, Normocephalic  EYES: EOMI, conjunctiva and sclera clear  CHEST/LUNG: Clear to auscultation bilaterally; No rales, rhonchi, wheezing, or rubs  HEART: Regular rate and rhythm; No murmurs, rubs, or gallops  ABDOMEN: Soft, Nontender, Nondistended;   SKIN: No rashes or lesions  NERVOUS SYSTEM:  Alert & Oriented X3, no focal deficits    LABS:                        10.8   3.97  )-----------( 128      ( 01 May 2025 06:00 )             33.7                         11.7   3.73  )-----------( 128      ( 30 Apr 2025 21:06 )             37.2     05-01    143  |  110[H]  |  35[H]  ----------------------------<  128[H]  5.0   |  25  |  0.67  04-30    144  |  109[H]  |  36[H]  ----------------------------<  138[H]  5.3   |  28  |  0.74    Ca    9.4      01 May 2025 06:00  Ca    10.0      30 Apr 2025 21:06  Phos  2.2     05-01  Mg     2.00     05-01    TPro  7.3  /  Alb  3.3  /  TBili  <0.2  /  DBili  x   /  AST  167[H]  /  ALT  343[H]  /  AlkPhos  146[H]  04-30    CAPILLARY BLOOD GLUCOSE      POCT Blood Glucose.: 145 mg/dL (01 May 2025 06:51)  POCT Blood Glucose.: 147 mg/dL (30 Apr 2025 20:38)    PT/INR - ( 30 Apr 2025 21:06 )   PT: 10.0 sec;   INR: <0.90 ratio         PTT - ( 30 Apr 2025 21:06 )  PTT:37.8 sec    Urinalysis Basic - ( 01 May 2025 06:00 )    Color: x / Appearance: x / SG: x / pH: x  Gluc: 128 mg/dL / Ketone: x  / Bili: x / Urobili: x   Blood: x / Protein: x / Nitrite: x   Leuk Esterase: x / RBC: x / WBC x   Sq Epi: x / Non Sq Epi: x / Bacteria: x          RADIOLOGY & ADDITIONAL TESTS:    Imaging Personally Reviewed:  [x ] YES  [ ] NO    Consultants involved in case:   Consultant(s) Notes Reviewed:  [ x] YES  [ ] NO:   Care Discussed with Consultants/Other Providers [x ] YES  [ ] NO         ***************************************************************  Vangie Alvarenga, PGY1  Internal Medicine   Available on Microsoft TEAMS  ***************************************************************    ROMULO KOHLI  89y  MRN: [id number]    Patient is a 89y old  Female who presents with a chief complaint of AMS, Severe hypothyroidism (01 May 2025 01:32)      Interval/Overnight Events: no events ON.     Subjective: Pt seen and examined at bedside. Could not participate in exam.     MEDICATIONS  (STANDING):  cefepime   IVPB 2000 milliGRAM(s) IV Intermittent every 8 hours  chlorhexidine 2% Cloths 1 Application(s) Topical daily  heparin   Injectable 5000 Unit(s) SubCutaneous every 8 hours  hydrocortisone sodium succinate Injectable 100 milliGRAM(s) IV Push every 8 hours  latanoprost 0.005% Ophthalmic Solution 1 Drop(s) Both EYES at bedtime  timolol 0.25% Solution 1 Drop(s) Both EYES daily    MEDICATIONS  (PRN):  acetaminophen     Tablet .. 650 milliGRAM(s) Oral every 6 hours PRN Temp greater or equal to 38C (100.4F), Mild Pain (1 - 3)      Objective:    Vitals: Vital Signs Last 24 Hrs  T(C): 33.9 (05-01-25 @ 06:30), Max: 33.9 (05-01-25 @ 06:30)  T(F): 93.1 (05-01-25 @ 06:30), Max: 93.1 (05-01-25 @ 06:30)  HR: 47 (05-01-25 @ 06:30) (40 - 47)  BP: 133/81 (05-01-25 @ 06:30) (133/81 - 155/89)  BP(mean): 94 (04-30-25 @ 20:53) (94 - 94)  RR: 18 (05-01-25 @ 06:30) (12 - 18)  SpO2: 100% (05-01-25 @ 06:30) (100% - 100%)                I&O's Summary      PHYSICAL EXAM:  GENERAL: NAD, wakes to sternal rubs  HEAD:  Atraumatic, Normocephalic  EYES: Pupils small, reactive   CHEST/LUNG: Clear to auscultation bilaterally in b/l upper extremities   HEART: Bradycardic, no murmurs or rubs   ABDOMEN: Soft, Nontender, Nondistended;   SKIN: No rashes or lesions  NERVOUS SYSTEM: Unable to follow commands   LABS:                        10.8   3.97  )-----------( 128      ( 01 May 2025 06:00 )             33.7                         11.7   3.73  )-----------( 128      ( 30 Apr 2025 21:06 )             37.2     05-01    143  |  110[H]  |  35[H]  ----------------------------<  128[H]  5.0   |  25  |  0.67  04-30    144  |  109[H]  |  36[H]  ----------------------------<  138[H]  5.3   |  28  |  0.74    Ca    9.4      01 May 2025 06:00  Ca    10.0      30 Apr 2025 21:06  Phos  2.2     05-01  Mg     2.00     05-01    TPro  7.3  /  Alb  3.3  /  TBili  <0.2  /  DBili  x   /  AST  167[H]  /  ALT  343[H]  /  AlkPhos  146[H]  04-30    CAPILLARY BLOOD GLUCOSE      POCT Blood Glucose.: 145 mg/dL (01 May 2025 06:51)  POCT Blood Glucose.: 147 mg/dL (30 Apr 2025 20:38)    PT/INR - ( 30 Apr 2025 21:06 )   PT: 10.0 sec;   INR: <0.90 ratio         PTT - ( 30 Apr 2025 21:06 )  PTT:37.8 sec    Urinalysis Basic - ( 01 May 2025 06:00 )    Color: x / Appearance: x / SG: x / pH: x  Gluc: 128 mg/dL / Ketone: x  / Bili: x / Urobili: x   Blood: x / Protein: x / Nitrite: x   Leuk Esterase: x / RBC: x / WBC x   Sq Epi: x / Non Sq Epi: x / Bacteria: x          RADIOLOGY & ADDITIONAL TESTS:    Imaging Personally Reviewed:  [x ] YES  [ ] NO    Consultants involved in case:   Consultant(s) Notes Reviewed:  [ x] YES  [ ] NO:   Care Discussed with Consultants/Other Providers [x ] YES  [ ] NO

## 2025-05-01 NOTE — CONSULT NOTE ADULT - ASSESSMENT
**************************RECOMMENDATIONS NOT FINAL UNTIL SIGNED BY ATTENDING**************************  89F with PMH hypothyroidism, HTN, HLD, mild cognitive decline (baseline MS AOx1), glaucoma (blind L eye), and h/o decubitus ulcer p/w AMS a/w hypothermia, bradycardia, transaminitis and acidosis on VBG. Patient was found to have TSH 56 with undetectable FT4, TT3 with normal PM cortisol c/f myxedema coma. s/p stress dose steroids and IV levothyroxine 100 mcg. Patient had a change in her dose of Synthroid from 112 mcg to 25 mcg as of 4/28/25 based on nursing [place] paperwork, however it is unclear why there was a dose change. Family reports they were not made aware of levothyroxine dose changes. She was discharged on 112 mcg dose during hospitalization from 6/9/2024 to 6/12/2024 with a TSH of 0.357 on 6/11/2024. Per primary team, outpatient TSH around time of dose change was 56. Endocrine consulted for further management.     #Severe hypothyroidism  #r/o myxedema coma  - H/o hypothyroidism on levothyroxine 112 mcg recently changed to 25 mcg on 4/28 per NH paperwork, unclear reason  - TSH 56, FT4 and TT3 undetectable  - AOx1 but conversant at [facility], currently with worsening AMS s/p RRT on 5/1   - s/p stress dose steroids and IV levothyroxine 100 mcg 4/30 PM   - PM cortisol prior to steroids 18, appropriately elevated     Plan:  - Discontinue HC   - start standing IV levothyroxine 75 mcg with plan to transition to  mcg (weight-based) in a few days, if patient is able to tolerate PO at that time  - repeat FT4 in 3 days    #HTN  - BP at goal, <130/80  - Not in AI, does not require steroids at this time    #HLD  - Lipid panel as OP    D/w primary team     Betty Humphries MD  Endocrinology Fellow  Can be reached via Microsoft Teams    For follow up questions, discharge recommendations, or new consults, please email [email or web address] ([email or web address]) or [email or web address] ([facility]) or call answering service at [phone] (weekdays); [phone] (nights/weekends).  For emergencies please page fellow on call.  89F with PMH hypothyroidism, HTN, HLD, mild cognitive decline (baseline MS AOx1), glaucoma (blind L eye), and h/o decubitus ulcer p/w AMS a/w hypothermia, bradycardia, transaminitis and acidosis on VBG. Patient was found to have TSH 56 with undetectable FT4, TT3 with normal PM cortisol c/f myxedema coma. s/p stress dose steroids and IV levothyroxine 100 mcg. Patient had a change in her dose of Synthroid from 112 mcg to 25 mcg as of 4/28/25 based on nursing [place] paperwork, however it is unclear why there was a dose change. Family reports they were not made aware of levothyroxine dose changes. She was discharged on 112 mcg dose during hospitalization from 6/9/2024 to 6/12/2024 with a TSH of 0.357 on 6/11/2024. Per primary team, outpatient TSH around time of dose change was 56. Endocrine consulted for further management.     #Severe hypothyroidism  #r/o myxedema coma  - H/o hypothyroidism on levothyroxine 112 mcg recently changed to 25 mcg on 4/28 per NH paperwork, unclear reason  - TSH 56, FT4 and TT3 undetectable  - AOx1 but conversant at [facility], currently with worsening AMS s/p RRT on 5/1   - s/p stress dose steroids and IV levothyroxine 100 mcg 4/30 PM   - PM cortisol prior to steroids 18, appropriately elevated     Plan:  - Discontinue HC   - start standing IV levothyroxine 75 mcg daily with plan to transition to  mcg (weight-based) in a few days, if patient is able to tolerate PO at that time  - repeat FT4 in 3 days    #HTN  - BP at goal, <130/80  - Not in AI, does not require steroids at this time    #HLD  - Lipid panel as OP    D/w primary team     Betty Humphries MD  Endocrinology Fellow  Can be reached via Microsoft Teams    For follow up questions, discharge recommendations, or new consults, please email [email or web address] ([email or web address]) or [email or web address] ([facility]) or call answering service at [phone] (weekdays); [phone] (nights/weekends).  For emergencies please page fellow on call.

## 2025-05-01 NOTE — PROCEDURE NOTE - ADDITIONAL PROCEDURE DETAILS
20G 5.71CM long, AccuCath extended dwell IV catheter placed under dynamic US guidance in right brachial vein with dark nonpulsatile blood return.  Catheter was flushed afterwards without any resistance or resulting extravasation.  IV catheter confirmed in compressible vein after insertion.

## 2025-05-01 NOTE — PATIENT PROFILE ADULT - FALL HARM RISK - HARM RISK INTERVENTIONS
Communicate Risk of Fall with Harm to all staff/Monitor for mental status changes/Monitor gait and stability/Tailored Fall Risk Interventions/Use of alarms - bed, chair and/or voice tab/Bed in lowest position, wheels locked, appropriate side rails in place/Call bell, personal items and telephone in reach/Instruct patient to call for assistance before getting out of bed or chair/Non-slip footwear when patient is out of bed/[place] to call system/Physically safe environment - no spills, clutter or unnecessary equipment/Purposeful Proactive Rounding/Room/bathroom lighting operational, light cord in reach

## 2025-05-01 NOTE — CONSULT NOTE ADULT - ATTENDING COMMENTS
89F with PMH hypothyroidism, HTN, HLD, mild cognitive decline, glaucoma, h/o decubitus ulcer, p/w AMS after presenting hypothermic, bradycardic, with markedly elevated TSH (56.29), ROSANA, and transaminitis. Patient with sepsis likely 2/2 UTI with severe hypothyroidism.   patient hypothermic, bradycardic, maintaining SBP 150s, protecting her airway, normal FS  pancx, infectious work up, Abx, procal, gracy hugger   endo consult appreciated, cortisol, TFTs, steroids and IV synthroid  tele monitoring   GOC   reconsult as needed
89F with history of hypothyroidism, dementia found with hypothermia, bradycardia and severe hypothyroidism on labs consistent with myxedema coma. Treated initially with IV steroid and IV levothyroxine. Cortisol prior to steroid ok, thus no indication of adrenal insufficiency and ok to dc steroid. Continue with levothyroxine IV route for now using weight based dosing. Will plan to transition to enteral levothyroxine in a few days once patient is clinically improved. Will follow.  High risk patient high level decision making.    Gage Cardoza MD  Division of Endocrinology  Pager: [phone]    If after 6PM or before 9AM, or on weekends/holidays, please call endocrine answering service for assistance ([phone]).  For nonurgent matters email [email or web address] for assistance.

## 2025-05-01 NOTE — H&P ADULT - HISTORY OF PRESENT ILLNESS
89F with PMH hypothyroidism, HTN, HLD, mild cognitive decline, glaucoma (blind L eye), and h/o decubitus ulcer, who presents with AMS. Patient is a resident in a nursing home since 10/2024 and is non-ambulatory. Per ED report, her baseline mental status is A&O x1 (person) but she is typically conversational. Per family, patient has had a change in mental status from her baseline as she did not recognize them, and her speech was altered. She followed commands inconsistently, which prompted presentation to the ED. Patient had a change in her dose of Synthroid from 112 mcg to 25 mcg based on nursing home paperwork. She was discharged on 112 mcg dose during hospitalization from 6/9/2024 to 6/12/2024 with a TSH of 0.357 on 6/11/2024.     In the ED, patient was found to be A&O x1 (Name), Hypothermia (T 32.8C), and Bradycardia (40’s). Labs notable for TSH 56.29, transaminitis (, , Alk Phos 146), and acidosis on VBG. Patient evaluated by endocrinology and MICU and given p IV levothyroxine 100mcg and Hydrocortisone 100 mg IV for potential myxedema coma although they had low suspicion for it.

## 2025-05-01 NOTE — CHART NOTE - NSCHARTNOTEFT_GEN_A_CORE
MICU ACCEPT NOTE      Patient is a 89y old  Female who presents with a chief complaint of AMS, Severe hypothyroidism (01 May 2025 13:37)      HPI:  89-year-old female full code with PMH hypothyroidism, HTN, HLD, cognitive decline, glaucoma (blind L eye) who resides at a nursing home. Patient was initially brought in for AMS and failure to thrive. In ED was found to be bradycardic 40s-50s and hypothermic. Labs notable for TSH of 56.28 and acidosis on vbg. 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 Was admitted to medicine for severe hypothyroidism w sepsis 2/2 UTI.   Per nursing home notes patient had a recent change in her dose of Synthroid from 112 mcg to 25 mcg. Was discharged on 112mcg dose during recent hospitalization 6/9/2024 to 6/12/2024 with a TSH of 0.357 on 6/11/2024. RRT was called on 5/1/25 for altered mental status. Stroke workup was negative for acute structural findings and vitals were stable. Second RRT called around 1845 which was escalated to a code blue for asystole. 90 seconds of chest compressions and ROSC was obtained. 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          REVIEW OF SYSTEMS:  CONSTITUTIONAL: No fever, chills  HEENT:  No blurry vision No sinus or throat pain  NECK: No pain or stiffness  RESPIRATORY: No cough, wheezing, chills or hemoptysis; No shortness of breath  CARDIOVASCULAR: No chest pain, palpitations  GASTROINTESTINAL: No abdominal pain. No nausea, vomiting, or diarrhea  GENITOURINARY: No dysuria  NEUROLOGICAL: No HA, No focal weakness  SKIN: No itching, burning, rashes, or lesions   MUSCULOSKELETAL: No joint pain or swelling; No muscle, back, or extremity pain    MEDICATIONS:  acetaminophen     Tablet .. 650 milliGRAM(s) Oral every 6 hours PRN  chlorhexidine 2% Cloths 1 Application(s) Topical daily  dextrose 5% + lactated ringers. 1000 milliLiter(s) IV Continuous <Continuous>  EPINEPHrine    Infusion 0.1 MICROgram(s)/kG/Min IV Continuous <Continuous>  heparin   Injectable 5000 Unit(s) SubCutaneous every 8 hours  latanoprost 0.005% Ophthalmic Solution 1 Drop(s) Both EYES at bedtime  levothyroxine Injectable 75 MICROGram(s) IV Push at bedtime  piperacillin/tazobactam IVPB.. 3.375 Gram(s) IV Intermittent every 8 hours  timolol 0.25% Solution 1 Drop(s) Both EYES daily      T(C): 35.3 (05-01-25 @ 16:48), Max: 35.3 (05-01-25 @ 16:48)  HR: 59 (05-01-25 @ 16:48) (40 - 61)  BP: 116/78 (05-01-25 @ 16:48) (116/78 - 155/89)  RR: 18 (05-01-25 @ 16:48) (12 - 18)  SpO2: 92% (05-01-25 @ 16:48) (92% - 100%)  Wt(kg): --Vital Signs Last 24 Hrs  T(C): 35.3 (01 May 2025 16:48), Max: 35.3 (01 May 2025 16:48)  T(F): 95.6 (01 May 2025 16:48), Max: 95.6 (01 May 2025 16:48)  HR: 59 (01 May 2025 16:48) (40 - 61)  BP: 116/78 (01 May 2025 16:48) (116/78 - 155/89)  BP(mean): 94 (30 Apr 2025 20:53) (94 - 94)  RR: 18 (01 May 2025 16:48) (12 - 18)  SpO2: 92% (01 May 2025 16:48) (92% - 100%)    Parameters below as of 01 May 2025 16:48  Patient On (Oxygen Delivery Method): nasal cannula  O2 Flow (L/min): 2      PHYSICAL EXAM:  GENERAL: not following commands  HEAD:  Atraumatic, Normocephalic  EYES: EOMI, PERRLA, conjunctiva and sclera clear  ENMT:  Moist mucous membranes  NECK: Supple, No JVD,  CHEST/LUNG: Clear to auscultation bilaterally; No rales, rhonchi, wheezing, or rubs  HEART: bradycardic; No murmurs, rubs, or gallops  ABDOMEN: Soft, Nontender, Nondistended; Bowel sounds present  NEURO: Alert & Oriented X1  EXTREMITIES: No LE edema, no calf tenderness  LYMPH: No lymphadenopathy noted  SKIN: No rashes or lesions    Consultant(s) Notes Reviewed:  [x ] YES  [ ] NO  Care Discussed with Consultants/Other Providers [ x] YES  [ ] NO    LABS:                        10.9   4.59  )-----------( 139      ( 01 May 2025 19:17 )             33.5     05-01    142  |  108[H]  |  36[H]  ----------------------------<  166[H]  5.2   |  23  |  0.88    Ca    9.6      01 May 2025 15:08  Phos  2.2     05-01  Mg     2.10     05-01    TPro  7.1  /  Alb  3.3  /  TBili  <0.2  /  DBili  x   /  AST  102[H]  /  ALT  265[H]  /  AlkPhos  137[H]  05-01    PT/INR - ( 01 May 2025 19:17 )   PT: 10.4 sec;   INR: 0.90 ratio         PTT - ( 01 May 2025 19:17 )  PTT:41.0 sec  Urinalysis Basic - ( 01 May 2025 15:08 )    Color: x / Appearance: x / SG: x / pH: x  Gluc: 166 mg/dL / Ketone: x  / Bili: x / Urobili: x   Blood: x / Protein: x / Nitrite: x   Leuk Esterase: x / RBC: x / WBC x   Sq Epi: x / Non Sq Epi: x / Bacteria: x      CAPILLARY BLOOD GLUCOSE      POCT Blood Glucose.: 168 mg/dL (01 May 2025 18:43)  POCT Blood Glucose.: 150 mg/dL (01 May 2025 16:48)  POCT Blood Glucose.: 125 mg/dL (01 May 2025 12:11)  POCT Blood Glucose.: 145 mg/dL (01 May 2025 06:51)  POCT Blood Glucose.: 147 mg/dL (30 Apr 2025 20:38)      ABG - ( 01 May 2025 17:24 )  pH, Arterial: 7.35  pH, Blood: x     /  pCO2: 51    /  pO2: 105   / HCO3: 28    / Base Excess: 1.8   /  SaO2: 97.2              Urinalysis Basic - ( 01 May 2025 15:08 )    Color: x / Appearance: x / SG: x / pH: x  Gluc: 166 mg/dL / Ketone: x  / Bili: x / Urobili: x   Blood: x / Protein: x / Nitrite: x   Leuk Esterase: x / RBC: x / WBC x   Sq Epi: x / Non Sq Epi: x / Bacteria: x        RADIOLOGY & ADDITIONAL TESTS:    Imaging Personally Reviewed:  [x ] YES  [ ] NO    ASSESSMENT & PLAN:  89y/o Female with PMHx of hypothyroidism, HTN, HLD, cognitive decline, glaucoma (blind L eye), admitted for severe hypothyroidism and sepsis 2/2 UTI,  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     NEUROLOGY:  #altered mental status  - likely 2/2 severe hypothyroidism vs sepsis (unlikely)  - baseline A&Ox1 but conversational  - CTH & CTA Head & Neck: No acute intracranial hemorrhage, mass effect, or midline shift. No large vessel occlusion, significant stenosis or vascular abnormality identified.  - w/u for other toxic/metabolic causes      PULMONARY:  #acute respiratory failure w hypercapnia  - vbg with respiratory acidosis, recent abg pH 7.35 pCO2 51  - trend abg  - on NC  - CXR negative for focal consolidations      CARDIOLOGY:  #cardiac arrest  - RRT called 5/1 for AMS and escalated to Code Blue  - 1 minute sinus pause, asystole   - 90 seconds to chest compressions, ROSC obtained, roughly 2 minutes downtime  - post cardiac arrest care    #sinus bradycardia  - ECG sinus bradycardia with 1st degree AV block  - started epinephrine gtt 5/1, titrate to HR 60-70    #HTN  - home meds: amlodipine 2.5 QD  - continue to monitor BP, goal <130/80      GASTROINTESTINAL:  #transaminitis  - ,   - continue to trend  - f/u acute hepatitis panel  - avoid hepatotoxins    #Diet  - NPO except medications      RENAL/:  #UTI  - UA with large leukocyte esterase, negative nitrites, 1834 RBC and 92 WBC  - started on zosyn  - see below for rest of infectious w/u      INFECTIOUS DISEASE:  #sepsis  - UA with large leukocyte esterase, negative nitrites, 1834 RBC and 92 WBC  - CXR without focal consolidation  - lactate 1.4, WBC 3.73, hypothermic  - received vanc and zosyn in ED  - started zosyn (4/1)  - trend temperatures and WBC  - f/u urine cx, blood cx, RVP, procal      HEMATOLOGY:  #No active issues    #DVT prophylaxis  - SQH      ENDOCRINE:  #severe hypothyroidism  - 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  - TSH 56.29 -> 49.13, FT4 and TT3 undetectable  - was given 100mg solumedrol in ED followed by 50mg 5/1  - recent change in her dose of Synthroid from 112 mcg to 25 mcg, unsure why  - d/c steroids   - start levothyroxine 75mcg IV daily, plan to transition to PO 100mcg   - repeat FT4 in 3 days  - endo following        LINES/DRAINS/TUBES/DEVICES:  - Kusum choe      ETHICS/DISPOSITION:  - Full code  - Admitted to MICU MICU ACCEPT NOTE      Patient is a 89y old  Female who presents with a chief complaint of AMS, Severe hypothyroidism (01 May 2025 13:37)      HPI:  89-year-old female full code with PMH hypothyroidism, HTN, HLD, cognitive decline, glaucoma (blind L eye) who resides at a nursing home. Patient was initially brought in for AMS and failure to thrive. In ED was found to be bradycardic 40s-50s and hypothermic. Labs notable for TSH of 56.28 and acidosis on vbg. 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 Was admitted to medicine for severe hypothyroidism w sepsis 2/2 UTI.   Per nursing home notes patient had a recent change in her dose of Synthroid from 112 mcg to 25 mcg. Was discharged on 112mcg dose during recent hospitalization 6/9/2024 to 6/12/2024 with a TSH of 0.357 on 6/11/2024. RRT was called on 5/1/25 for altered mental status. Stroke workup was negative for acute structural findings and vitals were stable. Second RRT called around 1845 which was escalated to a code blue for asystole. ROSC obtained with roughly 2 minutes of downtime. 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          REVIEW OF SYSTEMS:  CONSTITUTIONAL: No fever, chills  HEENT:  No blurry vision No sinus or throat pain  NECK: No pain or stiffness  RESPIRATORY: No cough, wheezing, chills or hemoptysis; No shortness of breath  CARDIOVASCULAR: No chest pain, palpitations  GASTROINTESTINAL: No abdominal pain. No nausea, vomiting, or diarrhea  GENITOURINARY: No dysuria  NEUROLOGICAL: No HA, No focal weakness  SKIN: No itching, burning, rashes, or lesions   MUSCULOSKELETAL: No joint pain or swelling; No muscle, back, or extremity pain    MEDICATIONS:  acetaminophen     Tablet .. 650 milliGRAM(s) Oral every 6 hours PRN  chlorhexidine 2% Cloths 1 Application(s) Topical daily  dextrose 5% + lactated ringers. 1000 milliLiter(s) IV Continuous <Continuous>  EPINEPHrine    Infusion 0.1 MICROgram(s)/kG/Min IV Continuous <Continuous>  heparin   Injectable 5000 Unit(s) SubCutaneous every 8 hours  latanoprost 0.005% Ophthalmic Solution 1 Drop(s) Both EYES at bedtime  levothyroxine Injectable 75 MICROGram(s) IV Push at bedtime  piperacillin/tazobactam IVPB.. 3.375 Gram(s) IV Intermittent every 8 hours  timolol 0.25% Solution 1 Drop(s) Both EYES daily      T(C): 35.3 (05-01-25 @ 16:48), Max: 35.3 (05-01-25 @ 16:48)  HR: 59 (05-01-25 @ 16:48) (40 - 61)  BP: 116/78 (05-01-25 @ 16:48) (116/78 - 155/89)  RR: 18 (05-01-25 @ 16:48) (12 - 18)  SpO2: 92% (05-01-25 @ 16:48) (92% - 100%)  Wt(kg): --Vital Signs Last 24 Hrs  T(C): 35.3 (01 May 2025 16:48), Max: 35.3 (01 May 2025 16:48)  T(F): 95.6 (01 May 2025 16:48), Max: 95.6 (01 May 2025 16:48)  HR: 59 (01 May 2025 16:48) (40 - 61)  BP: 116/78 (01 May 2025 16:48) (116/78 - 155/89)  BP(mean): 94 (30 Apr 2025 20:53) (94 - 94)  RR: 18 (01 May 2025 16:48) (12 - 18)  SpO2: 92% (01 May 2025 16:48) (92% - 100%)    Parameters below as of 01 May 2025 16:48  Patient On (Oxygen Delivery Method): nasal cannula  O2 Flow (L/min): 2      PHYSICAL EXAM:  GENERAL: not following commands  HEAD:  Atraumatic, Normocephalic  EYES: EOMI, PERRLA, conjunctiva and sclera clear  ENMT:  Moist mucous membranes  NECK: Supple, No JVD,  CHEST/LUNG: Clear to auscultation bilaterally; No rales, rhonchi, wheezing, or rubs  HEART: bradycardic; No murmurs, rubs, or gallops  ABDOMEN: Soft, Nontender, Nondistended; Bowel sounds present  NEURO: Alert & Oriented X1  EXTREMITIES: No LE edema, no calf tenderness  LYMPH: No lymphadenopathy noted  SKIN: No rashes or lesions    Consultant(s) Notes Reviewed:  [x ] YES  [ ] NO  Care Discussed with Consultants/Other Providers [ x] YES  [ ] NO    LABS:                        10.9   4.59  )-----------( 139      ( 01 May 2025 19:17 )             33.5     05-01    142  |  108[H]  |  36[H]  ----------------------------<  166[H]  5.2   |  23  |  0.88    Ca    9.6      01 May 2025 15:08  Phos  2.2     05-01  Mg     2.10     05-01    TPro  7.1  /  Alb  3.3  /  TBili  <0.2  /  DBili  x   /  AST  102[H]  /  ALT  265[H]  /  AlkPhos  137[H]  05-01    PT/INR - ( 01 May 2025 19:17 )   PT: 10.4 sec;   INR: 0.90 ratio         PTT - ( 01 May 2025 19:17 )  PTT:41.0 sec  Urinalysis Basic - ( 01 May 2025 15:08 )    Color: x / Appearance: x / SG: x / pH: x  Gluc: 166 mg/dL / Ketone: x  / Bili: x / Urobili: x   Blood: x / Protein: x / Nitrite: x   Leuk Esterase: x / RBC: x / WBC x   Sq Epi: x / Non Sq Epi: x / Bacteria: x      CAPILLARY BLOOD GLUCOSE      POCT Blood Glucose.: 168 mg/dL (01 May 2025 18:43)  POCT Blood Glucose.: 150 mg/dL (01 May 2025 16:48)  POCT Blood Glucose.: 125 mg/dL (01 May 2025 12:11)  POCT Blood Glucose.: 145 mg/dL (01 May 2025 06:51)  POCT Blood Glucose.: 147 mg/dL (30 Apr 2025 20:38)      ABG - ( 01 May 2025 17:24 )  pH, Arterial: 7.35  pH, Blood: x     /  pCO2: 51    /  pO2: 105   / HCO3: 28    / Base Excess: 1.8   /  SaO2: 97.2              Urinalysis Basic - ( 01 May 2025 15:08 )    Color: x / Appearance: x / SG: x / pH: x  Gluc: 166 mg/dL / Ketone: x  / Bili: x / Urobili: x   Blood: x / Protein: x / Nitrite: x   Leuk Esterase: x / RBC: x / WBC x   Sq Epi: x / Non Sq Epi: x / Bacteria: x        RADIOLOGY & ADDITIONAL TESTS:    Imaging Personally Reviewed:  [x ] YES  [ ] NO    ASSESSMENT & PLAN:  89y/o Female with PMHx of hypothyroidism, HTN, HLD, cognitive decline, glaucoma (blind L eye), admitted for severe hypothyroidism and sepsis 2/2 UTI,  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     NEUROLOGY:  #altered mental status  - likely 2/2 severe hypothyroidism vs sepsis (unlikely)  - baseline A&Ox1 but conversational  - CTH & CTA Head & Neck: No acute intracranial hemorrhage, mass effect, or midline shift. No large vessel occlusion, significant stenosis or vascular abnormality identified.  - w/u for other toxic/metabolic causes      PULMONARY:  #acute respiratory failure w hypercapnia  - vbg with respiratory acidosis, recent abg pH 7.35 pCO2 51  - trend abg  - on NC  - CXR negative for focal consolidations      CARDIOLOGY:  #cardiac arrest  - RRT called 5/1 for AMS and escalated to Code Blue  - 1 minute sinus pause, asystole   - 90 seconds to chest compressions, ROSC obtained, roughly 2 minutes downtime  - post cardiac arrest care  - monitor on telemetry  - cards consult    #sinus bradycardia  - ECG sinus bradycardia with 1st degree AV block  - started epinephrine gtt 5/1, titrate to HR 60-70  - monitor on telemetry  - cards consult    #HTN  - home meds: amlodipine 2.5 QD  - continue to monitor BP, goal <130/80      GASTROINTESTINAL:  #transaminitis  - ,   - continue to trend  - f/u acute hepatitis panel  - avoid hepatotoxins    #Diet  - NPO except medications      RENAL/:  #UTI  - UA with large leukocyte esterase, negative nitrites, 1834 RBC and 92 WBC  - started on zosyn  - see below for rest of infectious w/u      INFECTIOUS DISEASE:  #sepsis  - UA with large leukocyte esterase, negative nitrites, 1834 RBC and 92 WBC  - CXR without focal consolidation  - lactate 1.4, WBC 3.73, hypothermic  - received vanc and zosyn in ED  - started zosyn (4/1)  - trend temperatures and WBC  - f/u urine cx, blood cx, RVP, procal      HEMATOLOGY:  #No active issues    #DVT prophylaxis  - SQH      ENDOCRINE:  #severe hypothyroidism  - 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  - TSH 56.29 -> 49.13, FT4 and TT3 undetectable  - was given 100mg solumedrol in ED followed by 50mg 5/1  - recent change in her dose of Synthroid from 112 mcg to 25 mcg, unsure why  - d/c steroids   - start levothyroxine 75mcg IV daily, plan to transition to PO 100mcg   - repeat FT4 in 3 days  - endo following        LINES/DRAINS/TUBES/DEVICES:  - Kusum  - daija      ETHICS/DISPOSITION:  - Full code  - Admitted to MICU MICU ACCEPT NOTE      Patient is a 89y old  Female who presents with a chief complaint of AMS, Severe hypothyroidism (01 May 2025 13:37)      HPI:  89-year-old female full code with PMH hypothyroidism, HTN, HLD, cognitive decline, glaucoma (blind L eye) who resides at a nursing home. Patient was initially brought in for AMS and failure to thrive. In ED was found to be bradycardic 40s-50s and hypothermic. Labs notable for TSH of 56.28 and acidosis on vbg. 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 Was admitted to medicine for severe hypothyroidism w sepsis 2/2 UTI.   Per nursing home notes patient had a recent change in her dose of Synthroid from 112 mcg to 25 mcg. Was discharged on 112mcg dose during recent hospitalization 6/9/2024 to 6/12/2024 with a TSH of 0.357 on 6/11/2024. RRT was called on 5/1/25 for altered mental status. Stroke workup was negative for acute structural findings and vitals were stable. Second RRT called around 1845 which was escalated to a code blue for asystole. ROSC obtained with roughly 2 minutes of downtime. 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          REVIEW OF SYSTEMS:  CONSTITUTIONAL: No fever, chills  HEENT:  No blurry vision No sinus or throat pain  NECK: No pain or stiffness  RESPIRATORY: No cough, wheezing, chills or hemoptysis; No shortness of breath  CARDIOVASCULAR: No chest pain, palpitations  GASTROINTESTINAL: No abdominal pain. No nausea, vomiting, or diarrhea  GENITOURINARY: No dysuria  NEUROLOGICAL: No HA, No focal weakness  SKIN: No itching, burning, rashes, or lesions   MUSCULOSKELETAL: No joint pain or swelling; No muscle, back, or extremity pain    MEDICATIONS:  acetaminophen     Tablet .. 650 milliGRAM(s) Oral every 6 hours PRN  chlorhexidine 2% Cloths 1 Application(s) Topical daily  dextrose 5% + lactated ringers. 1000 milliLiter(s) IV Continuous <Continuous>  EPINEPHrine    Infusion 0.1 MICROgram(s)/kG/Min IV Continuous <Continuous>  heparin   Injectable 5000 Unit(s) SubCutaneous every 8 hours  latanoprost 0.005% Ophthalmic Solution 1 Drop(s) Both EYES at bedtime  levothyroxine Injectable 75 MICROGram(s) IV Push at bedtime  piperacillin/tazobactam IVPB.. 3.375 Gram(s) IV Intermittent every 8 hours  timolol 0.25% Solution 1 Drop(s) Both EYES daily      T(C): 35.3 (05-01-25 @ 16:48), Max: 35.3 (05-01-25 @ 16:48)  HR: 59 (05-01-25 @ 16:48) (40 - 61)  BP: 116/78 (05-01-25 @ 16:48) (116/78 - 155/89)  RR: 18 (05-01-25 @ 16:48) (12 - 18)  SpO2: 92% (05-01-25 @ 16:48) (92% - 100%)  Wt(kg): --Vital Signs Last 24 Hrs  T(C): 35.3 (01 May 2025 16:48), Max: 35.3 (01 May 2025 16:48)  T(F): 95.6 (01 May 2025 16:48), Max: 95.6 (01 May 2025 16:48)  HR: 59 (01 May 2025 16:48) (40 - 61)  BP: 116/78 (01 May 2025 16:48) (116/78 - 155/89)  BP(mean): 94 (30 Apr 2025 20:53) (94 - 94)  RR: 18 (01 May 2025 16:48) (12 - 18)  SpO2: 92% (01 May 2025 16:48) (92% - 100%)    Parameters below as of 01 May 2025 16:48  Patient On (Oxygen Delivery Method): nasal cannula  O2 Flow (L/min): 2      PHYSICAL EXAM:  GENERAL: not following commands  HEAD:  Atraumatic, Normocephalic  EYES: EOMI, PERRLA, conjunctiva and sclera clear  ENMT:  Moist mucous membranes  NECK: Supple, No JVD,  CHEST/LUNG: Clear to auscultation bilaterally; No rales, rhonchi, wheezing, or rubs  HEART: bradycardic; No murmurs, rubs, or gallops  ABDOMEN: Soft, Nontender, Nondistended; Bowel sounds present  NEURO: Alert & Oriented X1  EXTREMITIES: No LE edema, no calf tenderness  LYMPH: No lymphadenopathy noted  SKIN: No rashes or lesions    Consultant(s) Notes Reviewed:  [x ] YES  [ ] NO  Care Discussed with Consultants/Other Providers [ x] YES  [ ] NO    LABS:                        10.9   4.59  )-----------( 139      ( 01 May 2025 19:17 )             33.5     05-01    142  |  108[H]  |  36[H]  ----------------------------<  166[H]  5.2   |  23  |  0.88    Ca    9.6      01 May 2025 15:08  Phos  2.2     05-01  Mg     2.10     05-01    TPro  7.1  /  Alb  3.3  /  TBili  <0.2  /  DBili  x   /  AST  102[H]  /  ALT  265[H]  /  AlkPhos  137[H]  05-01    PT/INR - ( 01 May 2025 19:17 )   PT: 10.4 sec;   INR: 0.90 ratio         PTT - ( 01 May 2025 19:17 )  PTT:41.0 sec  Urinalysis Basic - ( 01 May 2025 15:08 )    Color: x / Appearance: x / SG: x / pH: x  Gluc: 166 mg/dL / Ketone: x  / Bili: x / Urobili: x   Blood: x / Protein: x / Nitrite: x   Leuk Esterase: x / RBC: x / WBC x   Sq Epi: x / Non Sq Epi: x / Bacteria: x      CAPILLARY BLOOD GLUCOSE      POCT Blood Glucose.: 168 mg/dL (01 May 2025 18:43)  POCT Blood Glucose.: 150 mg/dL (01 May 2025 16:48)  POCT Blood Glucose.: 125 mg/dL (01 May 2025 12:11)  POCT Blood Glucose.: 145 mg/dL (01 May 2025 06:51)  POCT Blood Glucose.: 147 mg/dL (30 Apr 2025 20:38)      ABG - ( 01 May 2025 17:24 )  pH, Arterial: 7.35  pH, Blood: x     /  pCO2: 51    /  pO2: 105   / HCO3: 28    / Base Excess: 1.8   /  SaO2: 97.2              Urinalysis Basic - ( 01 May 2025 15:08 )    Color: x / Appearance: x / SG: x / pH: x  Gluc: 166 mg/dL / Ketone: x  / Bili: x / Urobili: x   Blood: x / Protein: x / Nitrite: x   Leuk Esterase: x / RBC: x / WBC x   Sq Epi: x / Non Sq Epi: x / Bacteria: x        RADIOLOGY & ADDITIONAL TESTS:    Imaging Personally Reviewed:  [x ] YES  [ ] NO    ASSESSMENT & PLAN:  89y/o Female with PMHx of hypothyroidism, HTN, HLD, cognitive decline, glaucoma (blind L eye), admitted for severe hypothyroidism and sepsis 2/2 UTI,  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     NEUROLOGY:  #altered mental status  - likely 2/2 severe hypothyroidism vs sepsis (unlikely)  - baseline A&Ox1 but conversational  - CTH & CTA Head & Neck: No acute intracranial hemorrhage, mass effect, or midline shift. No large vessel occlusion, significant stenosis or vascular abnormality identified.  - w/u for other toxic/metabolic causes      PULMONARY:  #acute respiratory failure w hypercapnia  - vbg with respiratory acidosis, recent abg pH 7.35 pCO2 51  - trend abg  - on NC  - CXR negative for focal consolidations      CARDIOLOGY:  #cardiac arrest  - RRT called 5/1 for AMS and escalated to Code Blue  - 1 minute sinus pause, asystole   - 90 seconds to chest compressions, ROSC obtained, roughly 2 minutes downtime  - post cardiac arrest care  - monitor on telemetry  - cards consult    #sinus bradycardia  - ECG sinus bradycardia with 1st degree AV block  - started epinephrine gtt 5/1, titrate to HR 60-70  - monitor on telemetry  - cards consult    #HTN  - home meds: amlodipine 2.5 QD  - continue to monitor BP, goal <130/80      GASTROINTESTINAL:  #transaminitis  - ,   - continue to trend  - f/u acute hepatitis panel  - avoid hepatotoxins    #Diet  - NPO except medications      RENAL/:  #UTI  - UA with large leukocyte esterase, negative nitrites, 1834 RBC and 92 WBC  - started on zosyn  - see below for rest of infectious w/u      INFECTIOUS DISEASE:  #sepsis  - UA with large leukocyte esterase, negative nitrites, 1834 RBC and 92 WBC  - CXR without focal consolidation  - lactate 1.4, WBC 3.73, hypothermic  - received vanc and zosyn in ED  - started zosyn (4/1)  - trend temperatures and WBC  - f/u urine cx, blood cx, RVP, procal      HEMATOLOGY:  #No active issues    #DVT prophylaxis  - SQH      ENDOCRINE:  #severe hypothyroidism  - 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    Attending: I have personally and independently provided 50 minutes of critical care services.  This excludes any time spent on separate procedures or teaching.    Iwona Lopez is a 89 year old female with hypothyroidism, HTN, HLD, cognitive decline, glaucoma (blind L eye), who was initially admitted for myxedema coma and sepsis 2/2 to UTI.  Code blue was called on the floor, ROSC achieved after approximately 1.5 minutes of CPR, patient now admitted to MICU for post-arrest care.    Neuro  #Encephalopathy  -likely in setting of myxedema coma, CTA head/CTH non-con without acute intracranial abnormalities  -will continue treating myxedema coma as below, will consider additional diagnostic work up (vEEG, MRI) if no improvement    Respiratory  -patient currently protecting airway, on minimal O2 support, BiPAP as necessary for hypercapnia    Cardiovascular  #Bradycardia  -likely metabolic in setting of myxedema coma, will keep atropine at bedside, consider cardiology consult for pacemaker evaluation if no improvement after treatment of hypothyroidism and if in line with goals of care    ID  #UTI  -continue zosyn, f/u culture blood cultures    Renal  -no significant electrolyte abnormalities    GI  -NPO for now    Endocrine  #Myxedema coma  -patient s/p steroids for myxedema coma, will continue current levothyroxine 75 mcg daily, endocrine following     Hem-Onc  -heparin subq for DVT prophylaxis    DVT prophylaxis: heparin subq  Code Status: Full code MICU ACCEPT NOTE      Patient is a 89y old  Female who presents with a chief complaint of AMS, Severe hypothyroidism (01 May 2025 13:37)      HPI:  89-year-old female full code with PMH hypothyroidism, HTN, HLD, cognitive decline, glaucoma (blind L eye) who resides at a nursing home. Patient was initially brought in for AMS and failure to thrive. In ED was found to be bradycardic 40s-50s and hypothermic. Labs notable for TSH of 56.28 and acidosis on vbg. 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 Was admitted to medicine for severe hypothyroidism w sepsis 2/2 UTI.   Per nursing home notes patient had a recent change in her dose of Synthroid from 112 mcg to 25 mcg. Was discharged on 112mcg dose during recent hospitalization 6/9/2024 to 6/12/2024 with a TSH of 0.357 on 6/11/2024. RRT was called on 5/1/25 for altered mental status. Stroke workup was negative for acute structural findings and vitals were stable. Second RRT called around 1845 which was escalated to a code blue for asystole. ROSC obtained with roughly 2 minutes of downtime. 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          REVIEW OF SYSTEMS:  CONSTITUTIONAL: No fever, chills  HEENT:  No blurry vision No sinus or throat pain  NECK: No pain or stiffness  RESPIRATORY: No cough, wheezing, chills or hemoptysis; No shortness of breath  CARDIOVASCULAR: No chest pain, palpitations  GASTROINTESTINAL: No abdominal pain. No nausea, vomiting, or diarrhea  GENITOURINARY: No dysuria  NEUROLOGICAL: No HA, No focal weakness  SKIN: No itching, burning, rashes, or lesions   MUSCULOSKELETAL: No joint pain or swelling; No muscle, back, or extremity pain    MEDICATIONS:  acetaminophen     Tablet .. 650 milliGRAM(s) Oral every 6 hours PRN  chlorhexidine 2% Cloths 1 Application(s) Topical daily  dextrose 5% + lactated ringers. 1000 milliLiter(s) IV Continuous <Continuous>  EPINEPHrine    Infusion 0.1 MICROgram(s)/kG/Min IV Continuous <Continuous>  heparin   Injectable 5000 Unit(s) SubCutaneous every 8 hours  latanoprost 0.005% Ophthalmic Solution 1 Drop(s) Both EYES at bedtime  levothyroxine Injectable 75 MICROGram(s) IV Push at bedtime  piperacillin/tazobactam IVPB.. 3.375 Gram(s) IV Intermittent every 8 hours  timolol 0.25% Solution 1 Drop(s) Both EYES daily      T(C): 35.3 (05-01-25 @ 16:48), Max: 35.3 (05-01-25 @ 16:48)  HR: 59 (05-01-25 @ 16:48) (40 - 61)  BP: 116/78 (05-01-25 @ 16:48) (116/78 - 155/89)  RR: 18 (05-01-25 @ 16:48) (12 - 18)  SpO2: 92% (05-01-25 @ 16:48) (92% - 100%)  Wt(kg): --Vital Signs Last 24 Hrs  T(C): 35.3 (01 May 2025 16:48), Max: 35.3 (01 May 2025 16:48)  T(F): 95.6 (01 May 2025 16:48), Max: 95.6 (01 May 2025 16:48)  HR: 59 (01 May 2025 16:48) (40 - 61)  BP: 116/78 (01 May 2025 16:48) (116/78 - 155/89)  BP(mean): 94 (30 Apr 2025 20:53) (94 - 94)  RR: 18 (01 May 2025 16:48) (12 - 18)  SpO2: 92% (01 May 2025 16:48) (92% - 100%)    Parameters below as of 01 May 2025 16:48  Patient On (Oxygen Delivery Method): nasal cannula  O2 Flow (L/min): 2      PHYSICAL EXAM:  GENERAL: not following commands  HEAD:  Atraumatic, Normocephalic  EYES: EOMI, PERRLA, conjunctiva and sclera clear  ENMT:  Moist mucous membranes  NECK: Supple, No JVD,  CHEST/LUNG: Clear to auscultation bilaterally; No rales, rhonchi, wheezing, or rubs  HEART: bradycardic; No murmurs, rubs, or gallops  ABDOMEN: Soft, Nontender, Nondistended; Bowel sounds present  NEURO: Alert & Oriented X1  EXTREMITIES: No LE edema, no calf tenderness  LYMPH: No lymphadenopathy noted  SKIN: No rashes or lesions    Consultant(s) Notes Reviewed:  [x ] YES  [ ] NO  Care Discussed with Consultants/Other Providers [ x] YES  [ ] NO    LABS:                        10.9   4.59  )-----------( 139      ( 01 May 2025 19:17 )             33.5     05-01    142  |  108[H]  |  36[H]  ----------------------------<  166[H]  5.2   |  23  |  0.88    Ca    9.6      01 May 2025 15:08  Phos  2.2     05-01  Mg     2.10     05-01    TPro  7.1  /  Alb  3.3  /  TBili  <0.2  /  DBili  x   /  AST  102[H]  /  ALT  265[H]  /  AlkPhos  137[H]  05-01    PT/INR - ( 01 May 2025 19:17 )   PT: 10.4 sec;   INR: 0.90 ratio         PTT - ( 01 May 2025 19:17 )  PTT:41.0 sec  Urinalysis Basic - ( 01 May 2025 15:08 )    Color: x / Appearance: x / SG: x / pH: x  Gluc: 166 mg/dL / Ketone: x  / Bili: x / Urobili: x   Blood: x / Protein: x / Nitrite: x   Leuk Esterase: x / RBC: x / WBC x   Sq Epi: x / Non Sq Epi: x / Bacteria: x      CAPILLARY BLOOD GLUCOSE      POCT Blood Glucose.: 168 mg/dL (01 May 2025 18:43)  POCT Blood Glucose.: 150 mg/dL (01 May 2025 16:48)  POCT Blood Glucose.: 125 mg/dL (01 May 2025 12:11)  POCT Blood Glucose.: 145 mg/dL (01 May 2025 06:51)  POCT Blood Glucose.: 147 mg/dL (30 Apr 2025 20:38)      ABG - ( 01 May 2025 17:24 )  pH, Arterial: 7.35  pH, Blood: x     /  pCO2: 51    /  pO2: 105   / HCO3: 28    / Base Excess: 1.8   /  SaO2: 97.2              Urinalysis Basic - ( 01 May 2025 15:08 )    Color: x / Appearance: x / SG: x / pH: x  Gluc: 166 mg/dL / Ketone: x  / Bili: x / Urobili: x   Blood: x / Protein: x / Nitrite: x   Leuk Esterase: x / RBC: x / WBC x   Sq Epi: x / Non Sq Epi: x / Bacteria: x        RADIOLOGY & ADDITIONAL TESTS:    Imaging Personally Reviewed:  [x ] YES  [ ] NO    ASSESSMENT & PLAN:  89y/o Female with PMHx of hypothyroidism, HTN, HLD, cognitive decline, glaucoma (blind L eye), admitted for severe hypothyroidism and sepsis 2/2 UTI,  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     NEUROLOGY:  #altered mental status  - likely 2/2 severe hypothyroidism vs sepsis (unlikely)  - baseline A&Ox1 but conversational  - CTH & CTA Head & Neck: No acute intracranial hemorrhage, mass effect, or midline shift. No large vessel occlusion, significant stenosis or vascular abnormality identified.  - w/u for other toxic/metabolic causes      PULMONARY:  #acute respiratory failure w hypercapnia  - vbg with respiratory acidosis, recent abg pH 7.35 pCO2 51  - trend abg  - on NC  - CXR negative for focal consolidations      CARDIOLOGY:  #cardiac arrest  - RRT called 5/1 for AMS and escalated to Code Blue  - 1 minute sinus pause, asystole   - 90 seconds to chest compressions, ROSC obtained, roughly 2 minutes downtime  - post cardiac arrest care  - monitor on telemetry  - cards consult    #sinus bradycardia  - ECG sinus bradycardia with 1st degree AV block  - started epinephrine gtt 5/1, titrate to HR 60-70  - monitor on telemetry  - cards consult    #HTN  - home meds: amlodipine 2.5 QD  - continue to monitor BP, goal <130/80      GASTROINTESTINAL:  #transaminitis  - ,   - continue to trend  - f/u acute hepatitis panel  - avoid hepatotoxins    #Diet  - NPO except medications      RENAL/:  #UTI  - UA with large leukocyte esterase, negative nitrites, 1834 RBC and 92 WBC  - started on zosyn  - see below for rest of infectious w/u      INFECTIOUS DISEASE:  #sepsis  - UA with large leukocyte esterase, negative nitrites, 1834 RBC and 92 WBC  - CXR without focal consolidation  - lactate 1.4, WBC 3.73, hypothermic  - received vanc and zosyn in ED  - started zosyn (4/1)  - trend temperatures and WBC  - f/u urine cx, blood cx, RVP, procal      HEMATOLOGY:  #No active issues    #DVT prophylaxis  - SQH      ENDOCRINE:  #severe hypothyroidism  - 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    Attending: I have personally and independently provided 50 minutes of critical care services.  This excludes any time spent on separate procedures or teaching.    Iwona Lopez is a 89 year old female with hypothyroidism, HTN, HLD, cognitive decline, glaucoma (blind L eye), who was initially admitted for myxedema coma and sepsis 2/2 to UTI.  Code blue was called on the floor, ROSC achieved after approximately 1.5 minutes of CPR, patient now admitted to MICU for post-arrest care.    Neuro  #Encephalopathy  -likely in setting of myxedema coma, CTA head/CTH non-con without acute intracranial abnormalities  -will continue treating myxedema coma as below, will consider additional diagnostic work up (vEEG, MRI) if no improvement    Respiratory  -patient currently protecting airway, on minimal O2 support, BiPAP as necessary for hypercapnia    Cardiovascular  #Bradycardia  -likely metabolic in setting of myxedema coma, will keep atropine at bedside, consider cardiology consult for pacemaker evaluation if no improvement after treatment of hypothyroidism and if in line with goals of care    ID  #UTI  -continue zosyn, f/u culture blood cultures    Renal  -no significant electrolyte abnormalities    GI  -NPO for now    Endocrine  #Myxedema coma  -patient s/p steroids for myxedema coma, will continue current levothyroxine 75 mcg daily, endocrine following     Hem-Onc  -heparin subq for DVT prophylaxis    DVT prophylaxis: heparin subq  Code Status: DNR/DNI

## 2025-05-01 NOTE — H&P ADULT - TIME BILLING
I have spent 98 minutes of time on the encounter which excludes teaching and separately reported services.    Preparing to see the patient including review of tests and other providers' notes, confirming history with patient/family member, performing medical examination and evaluation, counseling and educating the patient/family/caregiver, ordering medications, tests and procedures, communicating with other health care professionals, documenting clinical information in the EMR, independently interpreting results and communicating results to the patient/family/caregiver, care coordination

## 2025-05-01 NOTE — PROGRESS NOTE ADULT - PROBLEM SELECTOR PLAN 2
::Possible Urosepsis   -T 32.8C, HR 40, /75, RR 16, SpO2 100% on RA  -WBC 3.73, Lactate 1.4 -> 1.1, pCO2 73, pH 7.27, HCO3 34, CRP 11.6,   -CXR: No focal consolidations.  -CTH & CTA Head & Neck: No acute intracranial hemorrhage, mass effect, or midline shift. No large vessel occlusion, significant stenosis or vascular abnormality   identified.  -ECG: Sinus bradycardia with 1st degree A-V block.    -UA: Cloudy, Large LE. Neg Nitrite, 92 WBC, 1834 RBC  -Vitals Q4H. Continuous pulse oximetry. Maintain spO2 >94%.  -s/p Vanc/Zosyn in ED. Continue w/ Cefepime for UTI pending cultures and workup   -Fluids: s/p 1L NS bolus   [ ] RVP, Procalcitonin, Blood cultures, Urine cultures -T 32.8C, HR 40, /75, RR 16, SpO2 100% on RA  -WBC 3.73, Lactate 1.4 -> 1.1, pCO2 73, pH 7.27, HCO3 34, CRP 11.6,   -CXR: No focal consolidations.  -CTH & CTA Head & Neck: No acute intracranial hemorrhage, mass effect, or midline shift. No large vessel occlusion, significant stenosis or vascular abnormality   identified.  -ECG: Sinus bradycardia with 1st degree A-V block.    -UA: Cloudy, Large LE. Neg Nitrite, 92 WBC, 1834 RBC    Plan:   -Vitals Q4H. Continuous pulse oximetry. Maintain spO2 >94%.  -s/p Vanc/Zosyn in ED. Continue w/ Cefepime for UTI pending cultures and workup   -Fluids: s/p 1L NS bolus   -f/u RVP, Procalcitonin, Blood cultures, Urine cultures -T 32.8C, HR 40, /75, RR 16, SpO2 100% on RA  -WBC 3.73, Lactate 1.4 -> 1.1, pCO2 73, pH 7.27, HCO3 34, CRP 11.6,   -CXR: No focal consolidations.  -CTH & CTA Head & Neck: No acute intracranial hemorrhage, mass effect, or midline shift. No large vessel occlusion, significant stenosis or vascular abnormality   identified.  -ECG: Sinus bradycardia with 1st degree A-V block.    -UA: Cloudy, Large LE. Neg Nitrite, 92 WBC, 1834 RBC    Plan:   -Vitals Q4H. Continuous pulse oximetry. Maintain spO2 >94%.  -s/p Vanc/Zosyn in ED, dc cefepime due to risk of cefepime induced neurotoxicity  -C/w zosyn pending cultures   -Fluids: s/p 1L NS bolus   -f/u RVP, Procalcitonin, Blood cultures, Urine cultures

## 2025-05-01 NOTE — RAPID RESPONSE TEAM SUMMARY - NSADDTLFINDINGSRRT_GEN_ALL_CORE
Pt was initially experiencing bradycardia down to 30s on telemetry monitoring, and RRT was called. Asystole was subsequently read on the monitor for 5-10 seconds, and then the nurse could not feel a pulse. So, RRT was upgraded to Code Blue and compressions were started.     Compressions were carried out for 1 round, about two minutes. ROSC was achieved. There were no medications or shock delivered.    Upon my evaluation, the patient was awake, alert, not responding to name or commands. She had palpable pulses peripherally and breathing evenly.    MICU consulted for symptomatic bradycardia and cardiac arrest, family member notified.

## 2025-05-01 NOTE — RAPID RESPONSE TEAM SUMMARY - NSSITUATIONBACKGROUNDRRT_GEN_ALL_CORE
89F with PMH hypothyroidism, HTN, HLD, mild cognitive decline, glaucoma (blind L eye), and h/o decubitus ulcer, who presents with AMS. Patient is a resident in a nursing home since 10/2024 and is non-ambulatory. Per ED report, her baseline mental status is A&O x1 (person) but she is typically conversational. Per family, patient has had a change in mental status from her baseline as she did not recognize them, and her speech was altered. She followed commands inconsistently, which prompted presentation to the ED. Patient had a change in her dose of Synthroid from 112 mcg to 25 mcg based on nursing home paperwork. She was discharged on 112 mcg dose during hospitalization from 6/9/2024 to 6/12/2024 with a TSH of 0.357 on 6/11/2024.     In the ED, patient was found to be A&O x1 (Name), Hypothermia (T 32.8C), and Bradycardia (40’s). Labs notable for TSH 56.29, transaminitis (, , Alk Phos 146), and acidosis on VBG. Patient evaluated by endocrinology and MICU and given p IV levothyroxine 100mcg and Hydrocortisone 100 mg IV for potential myxedema coma.     RRT called for altered mental status. Patient is s/p code stroke w/u with no acute structural findings. Patient vitals obtained showing temp of 95 with stable BP and sats. Blood sugars normal .Pt bradycardia from earlier in the morning noted to be improving. However, still persistently poor mentals status--at baseline patient is AOx1 and somewhat conversant. Able to make spontaneous movement with no focal abnormality identified on physical exam. ABG obtained at bedside with chemistry and blood count. Low suspicion for a new acute stroke. Will f/u with endocrinology regarding further recommendations re levothyroxine. Will also f/u re toxic/metabolic causes of her AMS.
89F with PMH hypothyroidism, HTN, HLD, mild cognitive decline, glaucoma (blind L eye), and h/o decubitus ulcer, who presents with AMS.    RRT, upgraded to Code Blue, called for bradycardia and then asystole.

## 2025-05-01 NOTE — H&P ADULT - PROBLEM SELECTOR PLAN 1
::A&O x1 (Name); Hypothermia (T 32.8C), Bradycardia (40’s). Lower c/f myedema coma per Endocrinology and MICU eval  -TSH 56.29, T4 0.90, T3 <30, PM Cortisol 18.8 on arrival   -Prior LT4 112 mcg (6/2024); TSH 0.357 on 6/11/24; Unsure why the LT4 was switched to 25 mcg QD   -pCO2 73, pH 7.27, HCO3 34. ?respiratory acidosis with metabolic compensation  -Endocrinology consulted. Appreciate Recs   -s/p Hydrocortisone 100 mg IV in ED. Will continue hydrocortisone 100 mg Q8H pending AM Cortisol levels   -s/p IV levothyroxine 100mcg in ED   -Neurochecks Q4H  -Continuous cardiac monitoring  -Falls/Seizure/Aspiration precautions   [ ] NPO pending Dysphagia screen   [ ] Infectious workup as below  [ ] Repeat VBG  [ ] AM Cortisol, Free T4/T3, Anti-TPO Ab  [ ] f/u Endocrinology recommendations  [ ] Low threshold for ICU consultation ::A&O x1 (Name); Hypothermia (T 32.8C), Bradycardia (40’s). Lower c/f myedema coma per Endocrinology and MICU eval  -TSH 56.29, T4 0.90, T3 <30, PM Cortisol 18.8 on arrival   -Prior LT4 112 mcg (6/2024); TSH 0.357 on 6/11/24; Unsure why the LT4 was switched to 25 mcg QD   -pCO2 73, pH 7.27, HCO3 34. ?respiratory acidosis with metabolic compensation (Possible undiagnosed COPD given hx of smoking per chart review)  -Endocrinology consulted. Appreciate Recs   -s/p Hydrocortisone 100 mg IV in ED. Will continue hydrocortisone 100 mg Q8H pending AM Cortisol levels   -s/p IV levothyroxine 100mcg in ED   -Neurochecks Q4H  -Continuous cardiac monitoring  -Falls/Seizure/Aspiration precautions   [ ] NPO pending Dysphagia screen   [ ] Infectious workup as below  [ ] Repeat VBG  [ ] AM Cortisol, Free T4/T3, Anti-TPO Ab  [ ] f/u Endocrinology recommendations  [ ] Low threshold for ICU consultation

## 2025-05-01 NOTE — H&P ADULT - PROBLEM SELECTOR PLAN 7
VTE PPx: Heparin SQ  Nutrition: NPO pending dysphagia screen  Fluids: None  Electrolytes: Maintain K>4, Mag >2, Phos > 3  Access: PIV  Dispo: pending clinical improvement

## 2025-05-01 NOTE — PROGRESS NOTE ADULT - PROBLEM SELECTOR PLAN 1
::A&O x1 (Name); Hypothermia (T 32.8C), Bradycardia (40’s). Lower c/f myedema coma per Endocrinology and MICU eval  -TSH 56.29, T4 0.90, T3 <30, PM Cortisol 18.8 on arrival   -Prior LT4 112 mcg (6/2024); TSH 0.357 on 6/11/24; Unsure why the LT4 was switched to 25 mcg QD   -pCO2 73, pH 7.27, HCO3 34. ?respiratory acidosis with metabolic compensation (Possible undiagnosed COPD given hx of smoking per chart review)  -Endocrinology consulted. Appreciate Recs   -s/p Hydrocortisone 100 mg IV in ED. Will continue hydrocortisone 100 mg Q8H pending AM Cortisol levels   -s/p IV levothyroxine 100mcg in ED   -Neurochecks Q4H  -Continuous cardiac monitoring  -Falls/Seizure/Aspiration precautions   [ ] NPO pending Dysphagia screen   [ ] Infectious workup as below  [ ] Repeat VBG  [ ] AM Cortisol, Free T4/T3, Anti-TPO Ab  [ ] f/u Endocrinology recommendations  [ ] Low threshold for ICU consultation - A&O x1 (Name); Hypothermia (T 32.8C), Bradycardia (40’s)  - TSH 56.29, T4 0.90, T3 <30, PM Cortisol 18.8 on arrival   - Prior LT4 112 mcg (6/2024); TSH 0.357 on 6/11/24; Unsure why the LT4 was switched to 25 mcg QD   - pCO2 73, pH 7.27, HCO3 34. Possible respiratory acidosis with metabolic compensation, possible undiagnosed COPD given smoking hx    Plan:   - Endocrinology consulted, f/u recs   - s/p Hydrocortisone 100 mg IV in ED. Will continue hydrocortisone 100 mg Q8H pending AM Cortisol levels   - s/p IV levothyroxine 100mcg in ED   - Neurochecks Q4H  - Continuous cardiac monitoring  - Falls/Seizure/Aspiration precautions   - NPO pending Dysphagia screen   - f/u AM Cortisol, Free T4/T3, Anti-TPO Ab  - Improved acidosis on repeat VBG - A&O x1 (Name); Hypothermia (T 32.8C), Bradycardia (40’s)  - TSH 56.29, T4 0.90, T3 <30, PM Cortisol 18.8 on arrival   - Prior LT4 112 mcg (6/2024); TSH 0.357 on 6/11/24; Unsure why the LT4 was switched to 25 mcg QD   - pCO2 73, pH 7.27, HCO3 34. Possible respiratory acidosis with metabolic compensation, possible undiagnosed COPD given smoking hx  - Diagnostic Scoring System for Myxedema Coma: 75    Plan:   - Endocrinology consulted, f/u recs   - s/p Hydrocortisone 100 mg IV in ED. Will continue hydrocortisone 100 mg Q8H   - s/p IV levothyroxine 100mcg in ED   - Neurochecks Q4H  - Continuous cardiac monitoring  - Falls/Seizure/Aspiration precautions   - NPO pending Dysphagia screen   - f/u AM Cortisol, Free T4/T3, Anti-TPO Ab  - Improved acidosis on repeat VBG, will repeat ABG today

## 2025-05-01 NOTE — PROGRESS NOTE ADULT - ATTENDING COMMENTS
89F hx of hypothyroidism, HTN, HLD, mild cognitive decline, glaucoma (blind L eye), and h/o decubitus ulcer presents with AMS c/f myxedema coma.  - AMS, likely 2/2 myxedema coma. TSH elevated and free T4 is low after a change in synthroid dosing from 112 -->25mcg. Pt with bradycardia (30s), hypothermia (T 93 C) and hypercapnia (VBG PCO2 70s). MICU rejected. Pt has been on pacer pads, gracy hugger. She received stress dose steroids and IV synthroid. Vitals are improving. HR is now in the 50-60s and temperature is now T 95 however mental status has not improved. PCO2 is improving. Initial CT imaging without acute intracranial finding to explain mental status. In the AM, pt is minimally responsive and not moving extremities too much. In the afternoon she was re-evaluated during a rapid response for AMS but appeared to be more alert with more purposeful movements but still having difficulty with speech. Will monitor for improvement in mental status while being treated for severe hypothyroidism. If no improvement will consider further neurological w/u with possible EEG vs MRI vs GOC discussions. Currently protecting her airway.   - Endocrinology is following for severe hypothyroidism, f/u recommendations.   - Sepsis 2/2 UTI - resolved. BCx and UCx pending. Cefepime transitioned to Zosyn given c/f CNS effects of cefepime.   Remainder of plan as stated above. Plan discussed with HS.

## 2025-05-01 NOTE — PROGRESS NOTE ADULT - PROBLEM SELECTOR PLAN 4
-Home medication: previously on Amlodipine 2.5 mg QD   -Monitor BP closely and adjust medications if clinically indicated - PMH glaucoma, legally blind L eye  - Continue home eye drops as prescribed

## 2025-05-01 NOTE — H&P ADULT - NSHPLABSRESULTS_GEN_ALL_CORE
11.7   3.73  )-----------( 128      ( 2025 21:06 )             37.2     144  |  109[H]  |  36[H]  ----------------------------<  138[H]       5.3   |  28  |  0.74    Ca    10.0      2025 21:06  Phos  2.0       Mg     2.20         TPro  7.3  /  Alb  3.3  /  TBili  <0.2  /  DBili  x   /  AST  167[H]  /  ALT  343[H]  /  AlkPhos  146[H]      PT/INR: 10.0/<0.90 (25 @ 21:06)  PTT: 37.8 (25 @ 21:06)      01:12 - VBG - pH: 7.31  | pCO2: 58    | pO2: 82    | Lactate: 1.2    22:11 - VBG - pH:       | pCO2:       | pO2:       | Lactate: 1.1    21:06 - VBG - pH: 7.27  | pCO2: 73    | pO2: 37    | Lactate: 1.4            hs Troponin, T - 35 ng/L (25 @ 21:06)      Pro-BNP: 444 (25 @ 21:06)          Urinalysis Basic - ( 2025 21:49 )  Color: Red / Appearance: Cloudy / S.018 / pH: 7.0  Gluc: Negative mg/dL / Ketone: Negative mg/dL  / Bili: Negative / Urobili: 0.2 mg/dL   Blood: Large / Protein: 100 mg/dL / Nitrite: Negative   Leuk Esterase: Large / RBC: 1834 /HPF / WBC 92 /HPF   Sq Epi: 1 /HPF / Bacteria: Occasional /HPF  Hyaline Casts: x/WBC Casts: x

## 2025-05-01 NOTE — PROGRESS NOTE ADULT - ASSESSMENT
89F with PMH hypothyroidism, HTN, HLD, mild cognitive decline, glaucoma (blind L eye), and h/o decubitus ulcer, who presents with AMS and found to have severe hypothyroidism w/ possible UTI. Admitted to medicine for further management and monitoring. Detailed plan as below:   89F with PMH hypothyroidism, HTN, HLD, mild cognitive decline, glaucoma (blind L eye), and h/o decubitus ulcer, who presents with AMS and found to have severe hypothyroidism w/ possible UTI. Admitted to medicine for further management and monitoring. Endo consulted for 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. MICU evaluated, not a MICU candidate at this time. S/p IV levothyroxine 100mcg and Hydrocortisone 100 mg IV.

## 2025-05-01 NOTE — PROGRESS NOTE ADULT - PROBLEM SELECTOR PLAN 7
VTE PPx: Heparin SQ  Nutrition: NPO pending dysphagia screen  Fluids: None  Electrolytes: Maintain K>4, Mag >2, Phos > 3  Access: PIV  Dispo: pending clinical improvement VTE PPx: Heparin SQ  Nutrition: NPO pending dysphagia screen  Dispo: pending clinical improvement VTE PPx: Heparin SQ  Nutrition: NPO   Dispo: pending clinical improvement -Frequent repositioning and offload pressure   -Wound care consultation

## 2025-05-02 NOTE — PROGRESS NOTE ADULT - SUBJECTIVE AND OBJECTIVE BOX
INTERVAL HPI/OVERNIGHT EVENTS:    HPI:    SUBJECTIVE: Patient seen and examined at bedside.     CONSTITUTIONAL: No weakness, fevers or chills  EYES/ENT: No visual changes;  No vertigo or throat pain   NECK: No pain or stiffness  RESPIRATORY: No cough, wheezing, hemoptysis; No shortness of breath  CARDIOVASCULAR: No chest pain or palpitations  GASTROINTESTINAL: No abdominal or epigastric pain. No nausea, vomiting, or hematemesis; No diarrhea or constipation. No melena or hematochezia.  GENITOURINARY: No dysuria, frequency or hematuria  NEUROLOGICAL: No numbness or weakness  SKIN: No itching, rashes    OBJECTIVE:    VITAL SIGNS:  ICU Vital Signs Last 24 Hrs  T(C): 36.7 (02 May 2025 08:00), Max: 36.9 (02 May 2025 04:00)  T(F): 98.1 (02 May 2025 08:00), Max: 98.4 (02 May 2025 04:00)  HR: 81 (02 May 2025 08:00) (39 - 83)  BP: 122/91 (02 May 2025 08:00) (94/58 - 185/120)  BP(mean): 101 (02 May 2025 08:00) (70 - 158)  ABP: --  ABP(mean): --  RR: 23 (02 May 2025 08:00) (13 - 30)  SpO2: 97% (02 May 2025 08:00) (92% - 100%)    O2 Parameters below as of 02 May 2025 09:00  Patient On (Oxygen Delivery Method): nasal cannula              05-01 @ 07:01 - 05-02 @ 07:00  --------------------------------------------------------  IN: 970 mL / OUT: 430 mL / NET: 540 mL    05-02 @ 07:01  -  05-02 @ 08:36  --------------------------------------------------------  IN: 70 mL / OUT: 0 mL / NET: 70 mL      CAPILLARY BLOOD GLUCOSE      POCT Blood Glucose.: 168 mg/dL (01 May 2025 18:43)      PHYSICAL EXAM:    General: NAD  HEENT: NC/AT; PERRL, clear conjunctiva  Neck: supple  Respiratory: CTA b/l  Cardiovascular: +S1/S2; RRR  Abdomen: soft, NT/ND; +BS x4  Extremities: WWP, 2+ peripheral pulses b/l; no LE edema  Skin: normal color and turgor; no rash  Neurological:    MEDICATIONS:  MEDICATIONS  (STANDING):  chlorhexidine 2% Cloths 1 Application(s) Topical daily  dextrose 5% + lactated ringers. 1000 milliLiter(s) (70 mL/Hr) IV Continuous <Continuous>  heparin   Injectable 5000 Unit(s) SubCutaneous every 8 hours  latanoprost 0.005% Ophthalmic Solution 1 Drop(s) Both EYES at bedtime  levothyroxine Injectable 75 MICROGram(s) IV Push at bedtime  piperacillin/tazobactam IVPB.. 3.375 Gram(s) IV Intermittent every 8 hours  timolol 0.25% Solution 1 Drop(s) Both EYES daily    MEDICATIONS  (PRN):  acetaminophen     Tablet .. 650 milliGRAM(s) Oral every 6 hours PRN Temp greater or equal to 38C (100.4F), Mild Pain (1 - 3)      ALLERGIES:  Allergies    No Known Allergies    Intolerances        LABS:                        10.3   4.73  )-----------( 132      ( 02 May 2025 04:10 )             32.1     05-02    145  |  109[H]  |  36[H]  ----------------------------<  125[H]  4.6   |  24  |  1.02    Ca    9.3      02 May 2025 04:10  Phos  2.1     05-02  Mg     2.00     05-02    TPro  6.5  /  Alb  3.0[L]  /  TBili  <0.2  /  DBili  x   /  AST  74[H]  /  ALT  235[H]  /  AlkPhos  122[H]  05-02    PT/INR - ( 02 May 2025 04:10 )   PT: 10.8 sec;   INR: 0.93 ratio         PTT - ( 01 May 2025 19:50 )  PTT:37.9 sec  Urinalysis Basic - ( 02 May 2025 04:10 )    Color: x / Appearance: x / SG: x / pH: x  Gluc: 125 mg/dL / Ketone: x  / Bili: x / Urobili: x   Blood: x / Protein: x / Nitrite: x   Leuk Esterase: x / RBC: x / WBC x   Sq Epi: x / Non Sq Epi: x / Bacteria: x        RADIOLOGY & ADDITIONAL TESTS: Reviewed. INTERVAL HPI/OVERNIGHT EVENTS: Accepted to MICU s/p cardiac arrest/ long pause was on epinephrine gtt for transport     HPI: 89-year-old female full code with PMH hypothyroidism, HTN, HLD, cognitive decline, glaucoma (blind L eye) who resides at a nursing home. Patient was initially brought in for AMS and failure to thrive. In ED was found to be bradycardic 40s-50s and hypothermic. Labs notable for TSH of 56.28 and acidosis on vbg. 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 Was admitted to medicine for severe hypothyroidism w sepsis 2/2 UTI.   Per nursing home notes patient had a recent change in her dose of Synthroid from 112 mcg to 25 mcg. Was discharged on 112mcg dose during recent hospitalization 6/9/2024 to 6/12/2024 with a TSH of 0.357 on 6/11/2024. RRT was called on 5/1/25 for altered mental status. Stroke workup was negative for acute structural findings and vitals were stable. Second RRT called around 1845 which was escalated to a code blue for asystole. ROSC obtained with roughly 2 minutes of downtime. 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      SUBJECTIVE: Patient seen and examined at bedside.     ROS: Unable to assess as patient with poor MS    OBJECTIVE:    VITAL SIGNS:  ICU Vital Signs Last 24 Hrs  T(C): 36.7 (02 May 2025 08:00), Max: 36.9 (02 May 2025 04:00)  T(F): 98.1 (02 May 2025 08:00), Max: 98.4 (02 May 2025 04:00)  HR: 81 (02 May 2025 08:00) (39 - 83)  BP: 122/91 (02 May 2025 08:00) (94/58 - 185/120)  BP(mean): 101 (02 May 2025 08:00) (70 - 158)  ABP: --  ABP(mean): --  RR: 23 (02 May 2025 08:00) (13 - 30)  SpO2: 97% (02 May 2025 08:00) (92% - 100%)    O2 Parameters below as of 02 May 2025 09:00  Patient On (Oxygen Delivery Method): nasal cannula      05-01 @ 07:01  -  05-02 @ 07:00  --------------------------------------------------------  IN: 970 mL / OUT: 430 mL / NET: 540 mL    05-02 @ 07:01 - 05-02 @ 08:36  --------------------------------------------------------  IN: 70 mL / OUT: 0 mL / NET: 70 mL      CAPILLARY BLOOD GLUCOSE      POCT Blood Glucose.: 168 mg/dL (01 May 2025 18:43)      PHYSICAL EXAM:    GENERAL: not following commands  HEAD:  Atraumatic, Normocephalic  EYES: EOMI, PERRLA, conjunctiva and sclera clear  ENMT:  Moist mucous membranes  NECK: Supple, No JVD,  CHEST/LUNG: Clear to auscultation bilaterally; No rales, rhonchi, wheezing, or rubs  HEART: bradycardic; No murmurs, rubs, or gallops  ABDOMEN: Soft, Nontender, Nondistended; Bowel sounds present  NEURO: Alert & Oriented X1  EXTREMITIES: No LE edema, no calf tenderness  LYMPH: No lymphadenopathy noted  SKIN: No rashes or lesions    MEDICATIONS:  MEDICATIONS  (STANDING):  chlorhexidine 2% Cloths 1 Application(s) Topical daily  dextrose 5% + lactated ringers. 1000 milliLiter(s) (70 mL/Hr) IV Continuous <Continuous>  heparin   Injectable 5000 Unit(s) SubCutaneous every 8 hours  latanoprost 0.005% Ophthalmic Solution 1 Drop(s) Both EYES at bedtime  levothyroxine Injectable 75 MICROGram(s) IV Push at bedtime  piperacillin/tazobactam IVPB.. 3.375 Gram(s) IV Intermittent every 8 hours  timolol 0.25% Solution 1 Drop(s) Both EYES daily    MEDICATIONS  (PRN):  acetaminophen     Tablet .. 650 milliGRAM(s) Oral every 6 hours PRN Temp greater or equal to 38C (100.4F), Mild Pain (1 - 3)      ALLERGIES:  Allergies    No Known Allergies    Intolerances        LABS:                        10.3   4.73  )-----------( 132      ( 02 May 2025 04:10 )             32.1     05-02    145  |  109[H]  |  36[H]  ----------------------------<  125[H]  4.6   |  24  |  1.02    Ca    9.3      02 May 2025 04:10  Phos  2.1     05-02  Mg     2.00     05-02    TPro  6.5  /  Alb  3.0[L]  /  TBili  <0.2  /  DBili  x   /  AST  74[H]  /  ALT  235[H]  /  AlkPhos  122[H]  05-02    PT/INR - ( 02 May 2025 04:10 )   PT: 10.8 sec;   INR: 0.93 ratio         PTT - ( 01 May 2025 19:50 )  PTT:37.9 sec  Urinalysis Basic - ( 02 May 2025 04:10 )    Color: x / Appearance: x / SG: x / pH: x  Gluc: 125 mg/dL / Ketone: x  / Bili: x / Urobili: x   Blood: x / Protein: x / Nitrite: x   Leuk Esterase: x / RBC: x / WBC x   Sq Epi: x / Non Sq Epi: x / Bacteria: x        RADIOLOGY & ADDITIONAL TESTS: Reviewed.

## 2025-05-02 NOTE — PROGRESS NOTE ADULT - ATTENDING COMMENTS
89 F hypothyroidism, htn, hld, glaucoma, admitted for sepsis due to UTI and myxedema coma and had long pause/cardiac arrest yesterday requiring MICU admission.  Off vasopressors overnight  patient opens eyes  per grand daughter, patient dnr/dni  unclear if this was true cardiac arrest as she did not need meds or intubation post, may have just been arrythmia from sepsis and myxedema coma    # cardiac arrest  # myxedema coma  # sepsis due to UTI  - c/w synthroid as per endo, hold steroids  - c/w broad abx  for UTI  - formal TTE    DNR/DNI  hsq 89 F hypothyroidism, htn, hld, glaucoma, admitted for sepsis due to UTI and myxedema coma and had long pause/cardiac arrest yesterday requiring MICU admission.  Off vasopressors overnight  patient opens eyes  per grand daughter, patient dnr/dni  unclear if this was true cardiac arrest as she did not need meds or intubation post, may have just been arrythmia from sepsis and myxedema coma    # acute metabolic encephalopathy due to myxedema coma, sepsis  # cardiac arrest  # myxedema coma  # sepsis due to UTI  - c/w synthroid as per endo, hold steroids  - c/w broad abx  for UTI  - formal TTE    DNR/DNI  hsq

## 2025-05-02 NOTE — PROGRESS NOTE ADULT - ATTENDING COMMENTS
89F with PMH hypothyroidism, HTN, HLD, mild cognitive decline (baseline MS AOx1), glaucoma (blind L eye), and h/o decubitus ulcer p/w AMS a/w hypothermia, bradycardia, transaminitis and acidosis on VBG. Patient was found to have TSH 56 with undetectable FT4, TT3 with normal PM cortisol c/f myxedema coma.    Continue with current levothyroxine 75 mcg IV daily, or 100 mcg PO qAM if tolerates. Please recheck free t4 next week. Adrenal insufficiency has been ruled out, and consider ruling out other etiologies for AMS and hypotension.

## 2025-05-02 NOTE — DIETITIAN INITIAL EVALUATION ADULT - PROBLEM SELECTOR PLAN 1
::A&O x1 (Name); Hypothermia (T 32.8C), Bradycardia (40’s). Lower c/f myedema coma per Endocrinology and MICU eval  -TSH 56.29, T4 0.90, T3 <30, PM Cortisol 18.8 on arrival   -Prior LT4 112 mcg (6/2024); TSH 0.357 on 6/11/24; Unsure why the LT4 was switched to 25 mcg QD   -pCO2 73, pH 7.27, HCO3 34. ?respiratory acidosis with metabolic compensation (Possible undiagnosed COPD given hx of smoking per chart review)  -Endocrinology consulted. Appreciate Recs   -s/p Hydrocortisone 100 mg IV in ED. Will continue hydrocortisone 100 mg Q8H pending AM Cortisol levels   -s/p IV levothyroxine 100mcg in ED   -Neurochecks Q4H  -Continuous cardiac monitoring  -Falls/Seizure/Aspiration precautions   [ ] NPO pending Dysphagia screen   [ ] Infectious workup as below  [ ] Repeat VBG  [ ] AM Cortisol, Free T4/T3, Anti-TPO Ab  [ ] f/u Endocrinology recommendations  [ ] Low threshold for ICU consultation

## 2025-05-02 NOTE — DIETITIAN INITIAL EVALUATION ADULT - ADD RECOMMEND
- Establish (nutritional) goals of care in re: to PO diet, if medically feasible.   - Monitor GI status, electrolytes, glucose

## 2025-05-02 NOTE — PROGRESS NOTE ADULT - SUBJECTIVE AND OBJECTIVE BOX
Betty Humphries MD, PGY-4  Endocrinology fellow  Available on [employer] Teams    Interval Events: RRT x2, Code blue 5/1 for asystole with transfer to MICU following ROSC. Pt seen and examined. NPO at present.      MEDICATIONS  (STANDING):  chlorhexidine 2% Cloths 1 Application(s) Topical daily  dextrose 5% + lactated ringers. 1000 milliLiter(s) (70 mL/Hr) IV Continuous <Continuous>  heparin   Injectable 5000 Unit(s) SubCutaneous every 8 hours  latanoprost 0.005% Ophthalmic Solution 1 Drop(s) Both EYES at bedtime  levothyroxine Injectable 75 MICROGram(s) IV Push at bedtime  piperacillin/tazobactam IVPB.. 3.375 Gram(s) IV Intermittent every 8 hours  timolol 0.25% Solution 1 Drop(s) Both EYES daily    MEDICATIONS  (PRN):  acetaminophen     Tablet .. 650 milliGRAM(s) Oral every 6 hours PRN Temp greater or equal to 38C (100.4F), Mild Pain (1 - 3)      Allergies    No Known Allergies    Intolerances          ================PHYSICAL EXAM======================  VITALS: T(C): 36.7 (05-02-25 @ 08:00)  T(F): 98.1 (05-02-25 @ 08:00), Max: 98.4 (05-02-25 @ 04:00)  HR: 63 (05-02-25 @ 11:00) (39 - 83)  BP: 71/42 (05-02-25 @ 11:00) (71/42 - 185/120)  RR:  (13 - 30)  SpO2:  (92% - 100%)  Wt(kg): --  GENERAL: NAD, appears comfortable  EYES: No proptosis, no lid lag, anicteric  HEENT:  Atraumatic, Normocephalic, moist mucous membranes  CARDIOVASCULAR: RRR, no MRG, S1/S2  RESPIRATORY: nonlabored respirations, no wheezing  ABDOMEN: Soft, nontender, nondistended, normal bowel sounds  EXTREMITIES: 2+ peripheral pulses, no edema  PSYCH: Alert and awake  ===================================================    CAPILLARY BLOOD GLUCOSE      POCT Blood Glucose.: 168 mg/dL (01 May 2025 18:43)  POCT Blood Glucose.: 150 mg/dL (01 May 2025 16:48)  POCT Blood Glucose.: 125 mg/dL (01 May 2025 12:11)      05-02    145  |  109[H]  |  36[H]  ----------------------------<  125[H]  4.6   |  24  |  1.02    eGFR: 52[L]    Ca    9.3      05-02  Mg     2.00     05-02  Phos  2.1     05-02    TPro  6.5  /  Alb  3.0[L]  /  TBili  <0.2  /  DBili  x   /  AST  74[H]  /  ALT  235[H]  /  AlkPhos  122[H]  05-02          Thyroid Function Tests:  05-01 @ 06:00 TSH 49.13 FreeT4 <0.3 T3 -- Anti TPO 10.8 Anti Thyroglobulin Ab -- TSI --  04-30 @ 21:06 TSH 56.29 FreeT4 -- T3 <30 Anti TPO -- Anti Thyroglobulin Ab -- TSI --                       Betty Humphries MD, PGY-4  Endocrinology fellow  Available on [employer] Teams    Interval Events: RRT x2, Code blue 5/1 for asystole with transfer to MICU following ROSC. Pt seen and examined. NPO at present. Unable to obtain ROS due to AMS.      MEDICATIONS  (STANDING):  chlorhexidine 2% Cloths 1 Application(s) Topical daily  dextrose 5% + lactated ringers. 1000 milliLiter(s) (70 mL/Hr) IV Continuous <Continuous>  heparin   Injectable 5000 Unit(s) SubCutaneous every 8 hours  latanoprost 0.005% Ophthalmic Solution 1 Drop(s) Both EYES at bedtime  levothyroxine Injectable 75 MICROGram(s) IV Push at bedtime  piperacillin/tazobactam IVPB.. 3.375 Gram(s) IV Intermittent every 8 hours  timolol 0.25% Solution 1 Drop(s) Both EYES daily    MEDICATIONS  (PRN):  acetaminophen     Tablet .. 650 milliGRAM(s) Oral every 6 hours PRN Temp greater or equal to 38C (100.4F), Mild Pain (1 - 3)      Allergies    No Known Allergies    Intolerances          ================PHYSICAL EXAM======================  VITALS: T(C): 36.7 (05-02-25 @ 08:00)  T(F): 98.1 (05-02-25 @ 08:00), Max: 98.4 (05-02-25 @ 04:00)  HR: 63 (05-02-25 @ 11:00) (39 - 83)  BP: 71/42 (05-02-25 @ 11:00) (71/42 - 185/120)  RR:  (13 - 30)  SpO2:  (92% - 100%)  Wt(kg): --  GENERAL: NAD, appears comfortable  EYES: No proptosis, no lid lag, anicteric  HEENT:  Atraumatic, Normocephalic, moist mucous membranes  CARDIOVASCULAR: RRR, no MRG, S1/S2  RESPIRATORY: nonlabored respirations, no wheezing  ABDOMEN: Soft, nontender, nondistended, normal bowel sounds  EXTREMITIES: 2+ peripheral pulses, no edema  PSYCH: Alert and awake  ===================================================    CAPILLARY BLOOD GLUCOSE      POCT Blood Glucose.: 168 mg/dL (01 May 2025 18:43)  POCT Blood Glucose.: 150 mg/dL (01 May 2025 16:48)  POCT Blood Glucose.: 125 mg/dL (01 May 2025 12:11)      05-02    145  |  109[H]  |  36[H]  ----------------------------<  125[H]  4.6   |  24  |  1.02    eGFR: 52[L]    Ca    9.3      05-02  Mg     2.00     05-02  Phos  2.1     05-02    TPro  6.5  /  Alb  3.0[L]  /  TBili  <0.2  /  DBili  x   /  AST  74[H]  /  ALT  235[H]  /  AlkPhos  122[H]  05-02          Thyroid Function Tests:  05-01 @ 06:00 TSH 49.13 FreeT4 <0.3 T3 -- Anti TPO 10.8 Anti Thyroglobulin Ab -- TSI --  04-30 @ 21:06 TSH 56.29 FreeT4 -- T3 <30 Anti TPO -- Anti Thyroglobulin Ab -- TSI --

## 2025-05-02 NOTE — DIETITIAN INITIAL EVALUATION ADULT - REASON FOR ADMISSION
89 y/o F with Hx of HTN, HLD, hypothyroidism, presenting with AMS.  Now admitted to MICU for myxedema coma, s/p RRT (5/1) for AMS, escalated to code blue.  Pt. s/p cardiac arrest and also with UTI and acute hypoxic respiratory failure w hypercapnia.    89 y/o F with Hx of HTN, HLD, hypothyroidism, presenting with AMS and adult "FTT".  Now admitted to MICU for myxedema coma, s/p RRT (5/1) for AMS, escalated to code blue.  Pt. s/p cardiac arrest and also with UTI and acute hypoxic respiratory failure w hypercapnia.

## 2025-05-02 NOTE — DIETITIAN INITIAL EVALUATION ADULT - OTHER INFO
Pt. alert, although nonverbal, disoriented and unable to participate with nutrition assessment. No family present @ time of RDN encounter.  Pt. DNR/DNI, per chart.  Spoke with RN and NP.    Ongoing goals of care discussion.    Chart review suggestive of possible (although questionably) ~18-19kg increase x <1 year (June 2024-current May 2025).

## 2025-05-02 NOTE — DIETITIAN INITIAL EVALUATION ADULT - PERTINENT LABORATORY DATA
05-02    145  |  109[H]  |  36[H]  ----------------------------<  125[H]  4.6   |  24  |  1.02    Ca    9.3      02 May 2025 04:10  Phos  2.1     05-02  Mg     2.00     05-02    CAPILLARY BLOOD GLUCOSE    POCT Blood Glucose.: 168 mg/dL (01 May 2025 18:43)  POCT Blood Glucose.: 150 mg/dL (01 May 2025 16:48)  POCT Blood Glucose.: 125 mg/dL (01 May 2025 12:11)

## 2025-05-02 NOTE — DIETITIAN INITIAL EVALUATION ADULT - PHYSCIAL ASSESSMENT
Limited physical assessment, as Pt. covered with blankets.    Visible muscle depletion and subcutaneous fat loss, although this is not entirely unexpected given Pt.'s advanced age.

## 2025-05-02 NOTE — DIETITIAN INITIAL EVALUATION ADULT - PERTINENT MEDS FT
MEDICATIONS  (STANDING):  chlorhexidine 2% Cloths 1 Application(s) Topical daily  dextrose 5% + lactated ringers. 1000 milliLiter(s) (70 mL/Hr) IV Continuous <Continuous>  heparin   Injectable 5000 Unit(s) SubCutaneous every 8 hours  latanoprost 0.005% Ophthalmic Solution 1 Drop(s) Both EYES at bedtime  levothyroxine Injectable 75 MICROGram(s) IV Push at bedtime  piperacillin/tazobactam IVPB.. 3.375 Gram(s) IV Intermittent every 8 hours  timolol 0.25% Solution 1 Drop(s) Both EYES daily    MEDICATIONS  (PRN):  acetaminophen     Tablet .. 650 milliGRAM(s) Oral every 6 hours PRN Temp greater or equal to 38C (100.4F), Mild Pain (1 - 3)

## 2025-05-02 NOTE — DIETITIAN INITIAL EVALUATION ADULT - ORAL INTAKE PTA/DIET HISTORY
Per transfer paperwork / orders from [facility]:  NSP (No Salt Packet), Mechanical Soft diet, regular liquids   + Prostat AWC 30mL PO 3x daily     Unable to quantify adequacy of PO intake PTA.

## 2025-05-02 NOTE — DIETITIAN INITIAL EVALUATION ADULT - NS FNS DIET ORDER
Diet, NPO:   Except Medications     Special Instructions for Nursing:  Except Medications (05-01-25 @ 19:17)

## 2025-05-02 NOTE — PROGRESS NOTE ADULT - ASSESSMENT
89y/o Female with PMHx of hypothyroidism, HTN, HLD, cognitive decline, glaucoma (blind L eye), admitted for severe hypothyroidism and sepsis 2/2 UTI,  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     NEUROLOGY:  #altered mental status  - likely 2/2 severe hypothyroidism vs sepsis (unlikely)  - baseline A&Ox1 but conversational  - CTH & CTA Head & Neck: No acute intracranial hemorrhage, mass effect, or midline shift. No large vessel occlusion, significant stenosis or vascular abnormality identified.  - w/u for other toxic/metabolic causes      PULMONARY:  #acute respiratory failure w hypercapnia  - vbg with respiratory acidosis, recent abg pH 7.35 pCO2 51  - trend abg  - on NC  - CXR negative for focal consolidations      CARDIOLOGY:  #cardiac arrest  - RRT called 5/1 for AMS and escalated to Code Blue  - 1 minute sinus pause, asystole   - 90 seconds to chest compressions, ROSC obtained, roughly 2 minutes downtime  - post cardiac arrest care  - monitor on telemetry  - cards consult    #sinus bradycardia  - ECG sinus bradycardia with 1st degree AV block  - started epinephrine gtt 5/1, titrate to HR 60-70  - monitor on telemetry  - cards consult    #HTN  - home meds: amlodipine 2.5 QD  - continue to monitor BP, goal <130/80      GASTROINTESTINAL:  #transaminitis  - ,   - continue to trend  - f/u acute hepatitis panel  - avoid hepatotoxins    #Diet  - NPO except medications      RENAL/:  #UTI  - UA with large leukocyte esterase, negative nitrites, 1834 RBC and 92 WBC  - started on zosyn  - see below for rest of infectious w/u      INFECTIOUS DISEASE:  #sepsis  - UA with large leukocyte esterase, negative nitrites, 1834 RBC and 92 WBC  - CXR without focal consolidation  - lactate 1.4, WBC 3.73, hypothermic  - received vanc and zosyn in ED  - started zosyn (4/1)  - trend temperatures and WBC  - f/u urine cx, blood cx, RVP, procal      HEMATOLOGY:  #No active issues    #DVT prophylaxis  - [facility]      ENDOCRINE:  #severe hypothyroidism  - 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  - TSH 56.29 -> 49.13, FT4 and TT3 undetectable  - was given 100mg solumedrol in ED followed by 50mg 5/1  - recent change in her dose of Synthroid from 112 mcg to 25 mcg, unsure why  - d/c steroids   - start levothyroxine 75mcg IV daily, plan to transition to PO 100mcg   - repeat FT4 in 3 days  - endo following        LINES/DRAINS/TUBES/DEVICES:  - [place] choe      ETHICS/DISPOSITION:  - Full code  - Admitted to MICU   89y/o Female with PMHx of hypothyroidism, HTN, HLD, cognitive decline, glaucoma (blind L eye), admitted for severe hypothyroidism and sepsis 2/2 UTI,  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     NEUROLOGY:  #altered mental status  - likely 2/2 severe hypothyroidism vs sepsis (unlikely)  - baseline A&Ox1 but conversational  - CTH & CTA Head & Neck: No acute intracranial hemorrhage, mass effect, or midline shift. No large vessel occlusion, significant stenosis or vascular abnormality identified.  - w/u for other toxic/metabolic causes      PULMONARY:  #acute respiratory failure w hypercapnia  - on NC 2L  - CXR negative for focal consolidations  - patient DNR/DNI       CARDIOLOGY:  #cardiac arrest vs prolonged pause   - RRT called 5/1 for AMS and escalated to Code Blue  - 1 minute sinus pause, asystole   - 90 seconds to chest compressions, ROSC obtained, roughly 2 minutes downtime no meds given   - post cardiac arrest care  - monitor on telemetry  - patient DNR/DNI     #sinus bradycardia  - ECG sinus bradycardia with 1st degree AV block  - started epinephrine gtt 5/1, titrate to HR 60-70 for transport now off  - monitor on telemetry    #HTN  - home meds: amlodipine 2.5 QD- holding   - continue to monitor BP, goal <130/80      GASTROINTESTINAL:  #transaminitis  - , - trending down   - continue to trend  - avoid hepatotoxins    #Diet  - NPO except medications  - on D5+LR at 70       RENAL/:  #UTI  - UA with large leukocyte esterase, negative nitrites, 1834 RBC and 92 WBC  - started on zosyn  - see below for rest of infectious w/u  - TOV tonight 11pm       INFECTIOUS DISEASE:  #sepsis likely due to UTI   - UA with large leukocyte esterase, negative nitrites, 1834 RBC and 92 WBC  - CXR without focal consolidation  - lactate 1.4, WBC 3.73, hypothermic  - received vanc and zosyn in ED  - started zosyn (4/1)-   - trend temperatures and WBC  - f/u urine cx, blood cx NGTD      HEMATOLOGY:  #No active issues    #DVT prophylaxis  - [facility]      ENDOCRINE:  #severe hypothyroidism  - 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  - TSH 56.29 -> 49.13, FT4 and TT3 undetectable  - was given 100mg solumedrol in ED followed by 50mg 5/1  - recent change in her dose of Synthroid from 112 mcg to 25 mcg, unsure why  - d/c steroids   - start levothyroxine 75mcg IV daily, plan to transition to PO 100mcg   - repeat FT4 in 3 days (5/4)   - endo following      LINES/DRAINS/TUBES/DEVICES:  - PIVs  - choe- TOV tonight 11pm- bladder scan q 8       ETHICS/DISPOSITION:  - Full code  - Admitted to MICU   89y/o Female with PMHx of hypothyroidism, HTN, HLD, cognitive decline, glaucoma (blind L eye), admitted for severe hypothyroidism and sepsis 2/2 UTI,  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     NEUROLOGY:  #altered mental status  - likely 2/2 severe hypothyroidism vs sepsis (unlikely)  - baseline A&Ox1 but conversational  - CTH & CTA Head & Neck: No acute intracranial hemorrhage, mass effect, or midline shift. No large vessel occlusion, significant stenosis or vascular abnormality identified.  - w/u for other toxic/metabolic causes      PULMONARY:  #acute respiratory failure w hypercapnia  - on NC 2L  - CXR negative for focal consolidations  - patient DNR/DNI       CARDIOLOGY:  #cardiac arrest vs prolonged pause   - RRT called 5/1 for AMS and escalated to Code Blue  - 1 minute sinus pause, asystole   - 90 seconds to chest compressions, ROSC obtained, roughly 2 minutes downtime no meds given   - post cardiac arrest care  - monitor on telemetry  - patient DNR/DNI     #sinus bradycardia  - ECG sinus bradycardia with 1st degree AV block  - started epinephrine gtt 5/1, titrate to HR 60-70 for transport now off  - monitor on telemetry    #HTN  - home meds: amlodipine 2.5 QD- holding   - continue to monitor BP, goal <130/80      GASTROINTESTINAL:  #transaminitis  - , - trending down   - continue to trend  - avoid hepatotoxins    #Diet  - NPO except medications  - on D5+LR at 70       RENAL/:  #UTI  - UA with large leukocyte esterase, negative nitrites, 1834 RBC and 92 WBC  - started on zosyn  - see below for rest of infectious w/u  - TOV tonight 11pm       INFECTIOUS DISEASE:  #sepsis likely due to UTI   - UA with large leukocyte esterase, negative nitrites, 1834 RBC and 92 WBC  - CXR without focal consolidation  - lactate 1.4, WBC 3.73, hypothermic  - received vanc and zosyn in ED  - started zosyn (4/1)-   - trend temperatures and WBC  - f/u urine cx, blood cx NGTD      HEMATOLOGY:  #No active issues    #DVT prophylaxis  - [facility]      ENDOCRINE:  #severe hypothyroidism  - 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  - TSH 56.29 -> 49.13, FT4 and TT3 undetectable  - was given 100mg solumedrol in ED followed by 50mg 5/1  - recent change in her dose of Synthroid from 112 mcg to 25 mcg, unsure why  - d/c steroids   - start levothyroxine 75mcg IV daily, plan to transition to PO 100mcg   - repeat FT4 in 3 days (5/4)   - endo following      LINES/DRAINS/TUBES/DEVICES:  - PIVs  - choe- TOV tonight 11pm- bladder scan q 8       ETHICS/DISPOSITION:  - Full code  - Admitted to MICU  - Palliative consult placed

## 2025-05-02 NOTE — PROGRESS NOTE ADULT - ASSESSMENT
89F with PMH hypothyroidism, HTN, HLD, mild cognitive decline (baseline MS AOx1), glaucoma (blind L eye), and h/o decubitus ulcer p/w AMS a/w hypothermia, bradycardia, transaminitis and acidosis on VBG. Patient was found to have TSH 56 with undetectable FT4, TT3 with normal PM cortisol c/f myxedema coma. s/p stress dose steroids and IV levothyroxine 100 mcg. Patient had a change in her dose of Synthroid from 112 mcg to 25 mcg as of 4/28/25 based on nursing [place] paperwork, however it is unclear why there was a dose change. Family reports they were not made aware of levothyroxine dose changes. She was discharged on 112 mcg dose during hospitalization from 6/9/2024 to 6/12/2024 with a TSH of 0.357 on 6/11/2024. Per primary team, outpatient TSH around time of dose change was 56. Endocrine consulted for further management.     #Severe hypothyroidism  #r/o myxedema coma  - H/o hypothyroidism on levothyroxine 112 mcg recently changed to 25 mcg on 4/28 per NH paperwork, unclear reason  - TSH 56, FT4 and TT3 undetectable  - AOx1 but conversant at BL, currently with worsening AMS s/p RRT on 5/1   - s/p stress dose steroids and IV levothyroxine 100 mcg 4/30 PM   - PM cortisol prior to steroids 18, appropriately elevated -- stress HC stopped 5/1    Plan:  - c/w IV levothyroxine 75 mcg daily with plan to transition to  mcg (weight-based) in a few days, if patient is able to tolerate PO at that time  - repeat FT4 5/4    #HTN  - BP at goal, <130/80  - Not in AI, does not require steroids at this time    #HLD  - Lipid panel as OP    D/w primary team     Betty Humphries MD  Endocrinology Fellow  Can be reached via Microsoft Teams    For follow up questions, discharge recommendations, or new consults, please email [email or web address] ([email or web address]) or [email or web address] ([facility]) or call answering service at [phone] (weekdays); [phone] (nights/weekends).  For emergencies please page fellow on call.     **************************RECOMMENDATIONS NOT FINAL UNTIL SIGNED BY ATTENDING**************************  89F with PMH hypothyroidism, HTN, HLD, mild cognitive decline (baseline MS AOx1), glaucoma (blind L eye), and h/o decubitus ulcer p/w AMS a/w hypothermia, bradycardia, transaminitis and acidosis on VBG. Patient was found to have TSH 56 with undetectable FT4, TT3 with normal PM cortisol c/f myxedema coma. s/p stress dose steroids and IV levothyroxine 100 mcg. Patient had a change in her dose of Synthroid from 112 mcg to 25 mcg as of 4/28/25 based on nursing [place] paperwork, however it is unclear why there was a dose change. Family reports they were not made aware of levothyroxine dose changes. She was discharged on 112 mcg dose during hospitalization from 6/9/2024 to 6/12/2024 with a TSH of 0.357 on 6/11/2024. Per primary team, outpatient TSH around time of dose change was 56. Endocrine consulted for further management.     #Severe hypothyroidism  #r/o myxedema coma  - H/o hypothyroidism on levothyroxine 112 mcg recently changed to 25 mcg on 4/28 per NH paperwork, unclear reason  - TSH 56, FT4 and TT3 undetectable  - AOx1 but conversant at BL, currently with worsening AMS s/p RRT on 5/1   - s/p stress dose steroids and IV levothyroxine 100 mcg 4/30 PM   - PM cortisol prior to steroids 18, appropriately elevated -- stress HC stopped 5/1    Plan:  - c/w IV levothyroxine 75 mcg daily with plan to transition to  mcg (weight-based) in a few days, if patient is able to tolerate PO at that time  - repeat FT4 5/4    #HTN  - BP at goal, <130/80  - Not in AI, does not require steroids at this time    #HLD  - Lipid panel as OP    D/w primary team     Betty Humphries MD  Endocrinology Fellow  Can be reached via Microsoft Teams    For follow up questions, discharge recommendations, or new consults, please email [email or web address] ([email or web address]) or [email or web address] ([facility]) or call answering service at [phone] (weekdays); [phone] (nights/weekends).  For emergencies please page fellow on call.

## 2025-05-02 NOTE — DIETITIAN INITIAL EVALUATION ADULT - NUTRITION CONSULT
yes
For information on Fall & Injury Prevention, visit: [email or web address] OR  [email or web address] OR  https://www.cdc.gov/steadi/patient.html

## 2025-05-03 NOTE — PROGRESS NOTE ADULT - SUBJECTIVE AND OBJECTIVE BOX
INTERVAL HPI/OVERNIGHT EVENTS:    HPI:    SUBJECTIVE: Patient seen and examined at bedside.     CONSTITUTIONAL: No weakness, fevers or chills  EYES/ENT: No visual changes;  No vertigo or throat pain   NECK: No pain or stiffness  RESPIRATORY: No cough, wheezing, hemoptysis; No shortness of breath  CARDIOVASCULAR: No chest pain or palpitations  GASTROINTESTINAL: No abdominal or epigastric pain. No nausea, vomiting, or hematemesis; No diarrhea or constipation. No melena or hematochezia.  GENITOURINARY: No dysuria, frequency or hematuria  NEUROLOGICAL: No numbness or weakness  SKIN: No itching, rashes    OBJECTIVE:    VITAL SIGNS:  ICU Vital Signs Last 24 Hrs  T(C): 36.2 (03 May 2025 04:00), Max: 37 (03 May 2025 00:00)  T(F): 97.2 (03 May 2025 04:00), Max: 98.6 (03 May 2025 00:00)  HR: 75 (03 May 2025 06:00) (51 - 108)  BP: 105/77 (03 May 2025 06:00) (71/42 - 162/85)  BP(mean): 86 (03 May 2025 06:00) (52 - 101)  ABP: --  ABP(mean): --  RR: 18 (03 May 2025 06:00) (12 - 33)  SpO2: 100% (03 May 2025 06:00) (94% - 100%)    O2 Parameters below as of 03 May 2025 06:00  Patient On (Oxygen Delivery Method): nasal cannula  O2 Flow (L/min): 2            05-02 @ 07:01  -  05-03 @ 07:00  --------------------------------------------------------  IN: 1739.5 mL / OUT: 502 mL / NET: 1237.5 mL      CAPILLARY BLOOD GLUCOSE      POCT Blood Glucose.: 95 mg/dL (03 May 2025 05:43)      PHYSICAL EXAM:    General: NAD  HEENT: NC/AT; PERRL, clear conjunctiva  Neck: supple  Respiratory: CTA b/l  Cardiovascular: +S1/S2; RRR  Abdomen: soft, NT/ND; +BS x4  Extremities: WWP, 2+ peripheral pulses b/l; no LE edema  Skin: normal color and turgor; no rash  Neurological:    MEDICATIONS:  MEDICATIONS  (STANDING):  chlorhexidine 2% Cloths 1 Application(s) Topical daily  dextrose 5% + lactated ringers. 1000 milliLiter(s) (70 mL/Hr) IV Continuous <Continuous>  heparin   Injectable 5000 Unit(s) SubCutaneous every 8 hours  latanoprost 0.005% Ophthalmic Solution 1 Drop(s) Both EYES at bedtime  levothyroxine Injectable 75 MICROGram(s) IV Push at bedtime  norepinephrine Infusion 0.05 MICROgram(s)/kG/Min (5.91 mL/Hr) IV Continuous <Continuous>  piperacillin/tazobactam IVPB.. 3.375 Gram(s) IV Intermittent every 8 hours  timolol 0.25% Solution 1 Drop(s) Both EYES daily    MEDICATIONS  (PRN):  acetaminophen     Tablet .. 650 milliGRAM(s) Oral every 6 hours PRN Temp greater or equal to 38C (100.4F), Mild Pain (1 - 3)      ALLERGIES:  Allergies    No Known Allergies    Intolerances        LABS:                        9.2    4.10  )-----------( 140      ( 03 May 2025 00:00 )             29.0     05-03    146[H]  |  112[H]  |  32[H]  ----------------------------<  154[H]  4.6   |  22  |  1.24    Ca    8.7      03 May 2025 00:00  Phos  3.0     05-03  Mg     2.00     05-03    TPro  5.8[L]  /  Alb  2.7[L]  /  TBili  <0.2  /  DBili  x   /  AST  56[H]  /  ALT  173[H]  /  AlkPhos  110  05-03    PT/INR - ( 03 May 2025 00:00 )   PT: 11.5 sec;   INR: 0.99 ratio         PTT - ( 01 May 2025 19:50 )  PTT:37.9 sec  Urinalysis Basic - ( 03 May 2025 00:00 )    Color: x / Appearance: x / SG: x / pH: x  Gluc: 154 mg/dL / Ketone: x  / Bili: x / Urobili: x   Blood: x / Protein: x / Nitrite: x   Leuk Esterase: x / RBC: x / WBC x   Sq Epi: x / Non Sq Epi: x / Bacteria: x        RADIOLOGY & ADDITIONAL TESTS: Reviewed. INTERVAL HPI/OVERNIGHT EVENTS: Patient with multiple asystolic/ long cardiac pauses overnight.  Given atropine x 2.  Also BP dropping started on levophed     HPI: 89-year-old female full code with PMH hypothyroidism, HTN, HLD, cognitive decline, glaucoma (blind L eye) who resides at a nursing home. Patient was initially brought in for AMS and failure to thrive. In ED was found to be bradycardic 40s-50s and hypothermic. Labs notable for TSH of 56.28 and acidosis on vbg. 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 Was admitted to medicine for severe hypothyroidism w sepsis 2/2 UTI.   Per nursing home notes patient had a recent change in her dose of Synthroid from 112 mcg to 25 mcg. Was discharged on 112mcg dose during recent hospitalization 6/9/2024 to 6/12/2024 with a TSH of 0.357 on 6/11/2024. RRT was called on 5/1/25 for altered mental status. Stroke workup was negative for acute structural findings and vitals were stable. Second RRT called around 1845 which was escalated to a code blue for asystole. ROSC obtained with roughly 2 minutes of downtime. 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      SUBJECTIVE: Patient seen and examined at bedside.     ROS: Unable to assess as patient with poor MS    OBJECTIVE:    VITAL SIGNS:  ICU Vital Signs Last 24 Hrs  T(C): 36.2 (03 May 2025 04:00), Max: 37 (03 May 2025 00:00)  T(F): 97.2 (03 May 2025 04:00), Max: 98.6 (03 May 2025 00:00)  HR: 75 (03 May 2025 06:00) (51 - 108)  BP: 105/77 (03 May 2025 06:00) (71/42 - 162/85)  BP(mean): 86 (03 May 2025 06:00) (52 - 101)  ABP: --  ABP(mean): --  RR: 18 (03 May 2025 06:00) (12 - 33)  SpO2: 100% (03 May 2025 06:00) (94% - 100%)    O2 Parameters below as of 03 May 2025 06:00  Patient On (Oxygen Delivery Method): nasal cannula  O2 Flow (L/min): 2      05-02 @ 07:01  -  05-03 @ 07:00  --------------------------------------------------------  IN: 1739.5 mL / OUT: 502 mL / NET: 1237.5 mL      CAPILLARY BLOOD GLUCOSE      POCT Blood Glucose.: 95 mg/dL (03 May 2025 05:43)      PHYSICAL EXAM:    GENERAL: not following commands  HEAD:  Atraumatic, Normocephalic  EYES: EOMI, PERRLA, conjunctiva and sclera clear  ENMT:  Moist mucous membranes  NECK: Supple, No JVD,  CHEST/LUNG: Clear to auscultation bilaterally; No rales, rhonchi, wheezing, or rubs  HEART: bradycardic; No murmurs, rubs, or gallops  ABDOMEN: Soft, Nontender, Nondistended; Bowel sounds present  NEURO: opening eyes not speaking or following commands   EXTREMITIES: No LE edema, no calf tenderness  LYMPH: No lymphadenopathy noted  SKIN: No rashes or lesions    MEDICATIONS:  MEDICATIONS  (STANDING):  chlorhexidine 2% Cloths 1 Application(s) Topical daily  dextrose 5% + lactated ringers. 1000 milliLiter(s) (70 mL/Hr) IV Continuous <Continuous>  heparin   Injectable 5000 Unit(s) SubCutaneous every 8 hours  latanoprost 0.005% Ophthalmic Solution 1 Drop(s) Both EYES at bedtime  levothyroxine Injectable 75 MICROGram(s) IV Push at bedtime  norepinephrine Infusion 0.05 MICROgram(s)/kG/Min (5.91 mL/Hr) IV Continuous <Continuous>  piperacillin/tazobactam IVPB.. 3.375 Gram(s) IV Intermittent every 8 hours  timolol 0.25% Solution 1 Drop(s) Both EYES daily    MEDICATIONS  (PRN):  acetaminophen     Tablet .. 650 milliGRAM(s) Oral every 6 hours PRN Temp greater or equal to 38C (100.4F), Mild Pain (1 - 3)      ALLERGIES:  Allergies    No Known Allergies    Intolerances        LABS:                        9.2    4.10  )-----------( 140      ( 03 May 2025 00:00 )             29.0     05-03    146[H]  |  112[H]  |  32[H]  ----------------------------<  154[H]  4.6   |  22  |  1.24    Ca    8.7      03 May 2025 00:00  Phos  3.0     05-03  Mg     2.00     05-03    TPro  5.8[L]  /  Alb  2.7[L]  /  TBili  <0.2  /  DBili  x   /  AST  56[H]  /  ALT  173[H]  /  AlkPhos  110  05-03    PT/INR - ( 03 May 2025 00:00 )   PT: 11.5 sec;   INR: 0.99 ratio         PTT - ( 01 May 2025 19:50 )  PTT:37.9 sec  Urinalysis Basic - ( 03 May 2025 00:00 )    Color: x / Appearance: x / SG: x / pH: x  Gluc: 154 mg/dL / Ketone: x  / Bili: x / Urobili: x   Blood: x / Protein: x / Nitrite: x   Leuk Esterase: x / RBC: x / WBC x   Sq Epi: x / Non Sq Epi: x / Bacteria: x        RADIOLOGY & ADDITIONAL TESTS: Reviewed.

## 2025-05-03 NOTE — PROGRESS NOTE ADULT - CRITICAL CARE ATTENDING COMMENT
Patient is a 89 yo F w/ HTN, HLD, Hypothyroid who is admitted to MICU for septic shock 2/2 UTI as well as severe hypothyroidism/myxedema coma  in setting out downtitrating synthroid as outpatient with hospital course c/b sinus pause/cardiac arrest.    #UTI  #Septic Shock  #Cardiac arrest vs sinus pause - EKG with 1st degree AV block. Review of tele of last pause 1113PM overnight with intermittent ? Aflutter  #Myxedema coma/Hypothyroid  - c/w vasopressors to maintain MAP >65 for now. WIll need to clarify GOC with HCP including use of vasopressors and PO access  - EP consult if in line with GOC  - Monitor on tele  - c/w synthroid, f/u endo recs  - c/w empiric Zosyn for now, f/u urine cultures  - Undergoing TOV  - NPO for now, will clarify PO access  - EKG this AM with 1st degree, no Aflutter    #DVT ppx - HSQ    Antony Aguero MD  Pulmonary & Critical Care Patient is a 89 yo F w/ HTN, HLD, Hypothyroid who is admitted to MICU for septic shock 2/2 UTI as well as severe hypothyroidism/myxedema coma  in setting out downtitrating synthroid as outpatient with hospital course c/b sinus pause/cardiac arrest.    #UTI  #Septic Shock  #Cardiac arrest vs sinus pause - EKG with 1st degree AV block. Review of tele of last pause 1113PM overnight with intermittent ? Aflutter  #Myxedema coma/Hypothyroid  - c/w vasopressors to maintain MAP >65 for now. WIll need to clarify GOC with HCP including use of vasopressors and PO access  - EP consult if in line with GOC  - Monitor on tele  - c/w synthroid, f/u endo recs  - c/w empiric Zosyn for now, f/u urine cultures  - Undergoing TOV  - NPO for now, will clarify PO access  - EKG this AM with 1st degree, no Aflutter    #GOC - DNR/DNI , WIll need to clarify GOC with HCP including use of vasopressors and PO access    #DVT ppx - HSQ    Antony Aguero MD  Pulmonary & Critical Care

## 2025-05-03 NOTE — PROGRESS NOTE ADULT - SUBJECTIVE AND OBJECTIVE BOX
CHIEF COMPLAINT:    Interval Events:    HPI:  89F with PMH hypothyroidism, HTN, HLD, mild cognitive decline, glaucoma (blind L eye), and h/o decubitus ulcer, who presents with AMS. Patient is a resident in a nursing home since 10/2024 and is non-ambulatory. Per ED report, her baseline mental status is A&O x1 (person) but she is typically conversational. Per family, patient has had a change in mental status from her baseline as she did not recognize them, and her speech was altered. She followed commands inconsistently, which prompted presentation to the ED. Patient had a change in her dose of Synthroid from 112 mcg to 25 mcg based on nursing home paperwork. She was discharged on 112 mcg dose during hospitalization from 6/9/2024 to 6/12/2024 with a TSH of 0.357 on 6/11/2024.     In the ED, patient was found to be A&O x1 (Name), Hypothermia (T 32.8C), and Bradycardia (40’s). Labs notable for TSH 56.29, transaminitis (, , Alk Phos 146), and acidosis on VBG. Patient evaluated by endocrinology and MICU and given p IV levothyroxine 100mcg and Hydrocortisone 100 mg IV for potential myxedema coma although they had low suspicion for it.    (01 May 2025 01:32)      REVIEW OF SYSTEMS:  Constitutional: [ ] negative [ ] fevers [ ] chills [ ] weight loss [ ] weight gain  HEENT: [ ] negative [ ] dry eyes [ ] eye irritation [ ] postnasal drip [ ] nasal congestion  CV: [ ] negative  [ ] chest pain [ ] orthopnea [ ] palpitations [ ] murmur  Resp: [ ] negative [ ] cough [ ] shortness of breath [ ] dyspnea [ ] wheezing [ ] sputum [ ] hemoptysis  GI: [ ] negative [ ] nausea [ ] vomiting [ ] diarrhea [ ] constipation [ ] abd pain [ ] dysphagia   : [ ] negative [ ] dysuria [ ] nocturia [ ] hematuria [ ] increased urinary frequency  Musculoskeletal: [ ] negative [ ] back pain [ ] myalgias [ ] arthralgias [ ] fracture  Skin: [ ] negative [ ] rash [ ] itch  Neurological: [ ] negative [ ] headache [ ] dizziness [ ] syncope [ ] weakness [ ] numbness  Psychiatric: [ ] negative [ ] anxiety [ ] depression  Endocrine: [ ] negative [ ] diabetes [ ] thyroid problem  Hematologic/Lymphatic: [ ] negative [ ] anemia [ ] bleeding problem  Allergic/Immunologic: [ ] negative [ ] itchy eyes [ ] nasal discharge [ ] hives [ ] angioedema  [ ] All other systems negative  [ ] Unable to assess ROS because ________    OBJECTIVE:  ICU Vital Signs Last 24 Hrs  T(C): 36.2 (03 May 2025 04:00), Max: 37 (03 May 2025 00:00)  T(F): 97.2 (03 May 2025 04:00), Max: 98.6 (03 May 2025 00:00)  HR: 75 (03 May 2025 06:00) (51 - 108)  BP: 105/77 (03 May 2025 06:00) (71/42 - 162/85)  BP(mean): 86 (03 May 2025 06:00) (52 - 101)  ABP: --  ABP(mean): --  RR: 18 (03 May 2025 06:00) (12 - 33)  SpO2: 100% (03 May 2025 06:00) (94% - 100%)    O2 Parameters below as of 03 May 2025 06:00  Patient On (Oxygen Delivery Method): nasal cannula  O2 Flow (L/min): 2            05-02 @ 07:01  -  05-03 @ 07:00  --------------------------------------------------------  IN: 1739.5 mL / OUT: 502 mL / NET: 1237.5 mL      CAPILLARY BLOOD GLUCOSE      POCT Blood Glucose.: 95 mg/dL (03 May 2025 05:43)      Physical Exam:   Constitutional: ill appearing, no acute distress   HEENT: + PERRLA, EOMI, no drainage or redness  Neck: supple,  No JVD  Respiratory: Ventilator assisted breath Sounds equal & clear bilaterally to auscultation, no accessory muscle use noted  Cardiovascular: Regular rate, regular rhythm, normal S1, S2; no murmurs or rub  Gastrointestinal: Soft, non-tender, non distended, no hepatosplenomegaly, normal bowel sounds  Extremities: + 2 peripheral edema, no cyanosis, no clubbing   Vascular: Equal and normal pulses: 2+ peripheral pulses throughout  Neurological: sedated/intubated  Psychiatric: calm, normal mood, normal affect  Musculoskeletal: No joint swelling or deformity; no limitation of movement  Skin: warm, dry, well perfused, no rashes    HOSPITAL MEDICATIONS:  Standing Meds:  chlorhexidine 2% Cloths 1 Application(s) Topical daily  dextrose 5% + lactated ringers. 1000 milliLiter(s) IV Continuous <Continuous>  heparin   Injectable 5000 Unit(s) SubCutaneous every 8 hours  latanoprost 0.005% Ophthalmic Solution 1 Drop(s) Both EYES at bedtime  levothyroxine Injectable 75 MICROGram(s) IV Push at bedtime  norepinephrine Infusion 0.05 MICROgram(s)/kG/Min IV Continuous <Continuous>  piperacillin/tazobactam IVPB.. 3.375 Gram(s) IV Intermittent every 8 hours  timolol 0.25% Solution 1 Drop(s) Both EYES daily      PRN Meds:  acetaminophen     Tablet .. 650 milliGRAM(s) Oral every 6 hours PRN      LABS:                        9.2    4.10  )-----------( 140      ( 03 May 2025 00:00 )             29.0     Hgb Trend: 9.2<--, 10.3<--, 11.3<--, 10.9<--, 10.9<--  05-03    146[H]  |  112[H]  |  32[H]  ----------------------------<  154[H]  4.6   |  22  |  1.24    Ca    8.7      03 May 2025 00:00  Phos  3.0     05-03  Mg     2.00     05-03    TPro  5.8[L]  /  Alb  2.7[L]  /  TBili  <0.2  /  DBili  x   /  AST  56[H]  /  ALT  173[H]  /  AlkPhos  110  05-03    Creatinine Trend: 1.24<--, 1.02<--, 0.91<--, 0.88<--, 0.67<--, 0.74<--  PT/INR - ( 03 May 2025 00:00 )   PT: 11.5 sec;   INR: 0.99 ratio         PTT - ( 01 May 2025 19:50 )  PTT:37.9 sec  Urinalysis Basic - ( 03 May 2025 00:00 )    Color: x / Appearance: x / SG: x / pH: x  Gluc: 154 mg/dL / Ketone: x  / Bili: x / Urobili: x   Blood: x / Protein: x / Nitrite: x   Leuk Esterase: x / RBC: x / WBC x   Sq Epi: x / Non Sq Epi: x / Bacteria: x      Arterial Blood Gas:  05-01 @ 23:34  7.42/40/99/26/96.2/1.3  ABG lactate: --  Arterial Blood Gas:  05-01 @ 19:50  7.35/49/160/27/97.9/0.9  ABG lactate: --  Arterial Blood Gas:  05-01 @ 17:24  7.35/51/105/28/97.2/1.8  ABG lactate: --    Venous Blood Gas:  05-03 @ 00:00  7.29/54/99/26/96.0  VBG Lactate: 3.0  Venous Blood Gas:  05-02 @ 04:10  7.36/48/62/27/90.0  VBG Lactate: 3.3      MICROBIOLOGY:     Culture - Urine (collected 30 Apr 2025 21:00)  Source: Clean Catch Clean Catch (Midstream)  Preliminary Report (02 May 2025 17:11):    >100,000 CFU/ml Gram Negative Rods    <10,000 CFU/ml Normal Urogenital richy present    Culture - Blood (collected 30 Apr 2025 21:00)  Source: Blood Blood-Peripheral  Preliminary Report (03 May 2025 01:02):    No growth at 48 Hours    Culture - Blood (collected 30 Apr 2025 20:55)  Source: Blood Blood-Peripheral  Preliminary Report (03 May 2025 01:02):    No growth at 48 Hours      RADIOLOGY:  [ ] Reviewed and interpreted by me

## 2025-05-03 NOTE — CHART NOTE - NSCHARTNOTEFT_GEN_A_CORE
89F with PMH hypothyroidism, HTN, HLD, mild cognitive decline (baseline MS AOx1), glaucoma (blind L eye), and h/o decubitus ulcer p/w AMS a/w hypothermia, bradycardia, transaminitis and acidosis on VBG. Patient was found to have TSH 56 with undetectable FT4, TT3 with normal PM cortisol c/f myxedema coma. s/p stress dose steroids and IV levothyroxine 100 mcg. Patient had a change in her dose of Synthroid from 112 mcg to 25 mcg as of 4/28/25 based on nursing [place] paperwork, however it is unclear why there was a dose change. Family reports they were not made aware of levothyroxine dose changes. She was discharged on 112 mcg dose during hospitalization from 6/9/2024 to 6/12/2024 with a TSH of 0.357 on 6/11/2024. Per primary team, outpatient TSH around time of dose change was 56. Endocrine consulted for further management.     #Severe hypothyroidism  #r/o myxedema coma  - H/o hypothyroidism on levothyroxine 112 mcg recently changed to 25 mcg on 4/28 per NH paperwork, unclear reason  - TSH 56, FT4 and TT3 undetectable  - AOx1 but conversant at [facility], currently with worsening AMS s/p RRT on 5/1   - s/p stress dose steroids and IV levothyroxine 100 mcg 4/30 PM   - PM cortisol prior to steroids 18, appropriately elevated -- stress HC stopped 5/1    Plan:  - c/w IV levothyroxine 75 mcg daily with plan to transition to  mcg (weight-based) in a few days, if patient is able to tolerate PO at that time  - repeat FT4 5/4  - MICU team to discuss GOC with family - please update Endocrine team with any changes in management    #HTN  - BP at goal, <130/80  - Not in AI, does not require steroids at this time  - currently on pressors    #HLD  - Lipid panel as OP    D/w primary team     Betty Humphries MD  Endocrinology Fellow  Can be reached via [employer] Teams    For follow up questions, discharge recommendations, or new consults, please email [email or web address] or [email or web address] ([email or web address]) or call answering service at [phone] (weekdays); [phone] (nights/weekends).  For emergencies please page fellow on call.

## 2025-05-03 NOTE — CHART NOTE - NSCHARTNOTEFT_GEN_A_CORE
Cata Perez - Granddaughter HCP (first contact) [phone] at bedside to re-address goals of care. She wishes to transition to comfort measures, no further blood draws, maintain DNR/DNI, and discontinue all other non symptom directed medications. However, she does not want to withdraw levophed at this time, however is amendable to capping at current dose and if titrated off, not to restart. Also wanted to continue IVF. New MOLST filled and placed in chart. Dilaudid prn, ativan prn and glycopyrrolate prn ordered for symptom directed management. HCP will remain at bedside. All questions addressed. Conversation witnessed by Nurse Leblanc. Attending notified.

## 2025-05-03 NOTE — PROGRESS NOTE ADULT - ASSESSMENT
89y/o Female with PMHx of hypothyroidism, HTN, HLD, cognitive decline, glaucoma (blind L eye), admitted for severe hypothyroidism and sepsis 2/2 UTI,  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     NEUROLOGY:  #altered mental status  - likely 2/2 severe hypothyroidism vs sepsis (unlikely)  - baseline A&Ox1 but conversational  - CTH & CTA Head & Neck: No acute intracranial hemorrhage, mass effect, or midline shift. No large vessel occlusion, significant stenosis or vascular abnormality identified.  - w/u for other toxic/metabolic causes      PULMONARY:  #acute respiratory failure w hypercapnia  - on NC 2L  - CXR negative for focal consolidations  - patient DNR/DNI       CARDIOLOGY:  #cardiac arrest vs prolonged pause   - RRT called 5/1 for AMS and escalated to Code Blue  - 1 minute sinus pause, asystole   - 90 seconds to chest compressions, ROSC obtained, roughly 2 minutes downtime no meds given   - post cardiac arrest care  - monitor on telemetry  - patient DNR/DNI     #sinus bradycardia  - ECG sinus bradycardia with 1st degree AV block  - started epinephrine gtt 5/1, titrate to HR 60-70 for transport now off  - monitor on telemetry    #HTN  - home meds: amlodipine 2.5 QD- holding   - continue to monitor BP, goal <130/80      GASTROINTESTINAL:  #transaminitis  - , - trending down   - continue to trend  - avoid hepatotoxins    #Diet  - NPO except medications  - on D5+LR at 70       RENAL/:  #UTI  - UA with large leukocyte esterase, negative nitrites, 1834 RBC and 92 WBC  - started on zosyn  - see below for rest of infectious w/u  - TOV tonight 11pm       INFECTIOUS DISEASE:  #sepsis likely due to UTI   - UA with large leukocyte esterase, negative nitrites, 1834 RBC and 92 WBC  - CXR without focal consolidation  - lactate 1.4, WBC 3.73, hypothermic  - received vanc and zosyn in ED  - started zosyn (4/1)-   - trend temperatures and WBC  - f/u urine cx, blood cx NGTD      HEMATOLOGY:  #No active issues    #DVT prophylaxis  - [facility]      ENDOCRINE:  #severe hypothyroidism  - 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  - TSH 56.29 -> 49.13, FT4 and TT3 undetectable  - was given 100mg solumedrol in ED followed by 50mg 5/1  - recent change in her dose of Synthroid from 112 mcg to 25 mcg, unsure why  - d/c steroids   - start levothyroxine 75mcg IV daily, plan to transition to PO 100mcg   - repeat FT4 in 3 days (5/4)   - endo following      LINES/DRAINS/TUBES/DEVICES:  - PIVs  - choe- TOV tonight 11pm- bladder scan q 8       ETHICS/DISPOSITION:  - Full code  - Admitted to MICU  - Palliative consult placed    89y/o Female with PMHx of hypothyroidism, HTN, HLD, cognitive decline, glaucoma (blind L eye), admitted for severe hypothyroidism and sepsis 2/2 UTI,  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     NEUROLOGY:  #altered mental status  - likely 2/2 severe hypothyroidism vs sepsis (unlikely)  - baseline A&Ox1 but conversational- now opening eyes not speaking or following commands   - CTH & CTA Head & Neck: No acute intracranial hemorrhage, mass effect, or midline shift. No large vessel occlusion, significant stenosis or vascular abnormality identified.  - w/u for other toxic/metabolic causes      PULMONARY:  #acute respiratory failure w hypercapnia  - on NC 2L  - CXR negative for focal consolidations  - patient DNR/DNI       CARDIOLOGY:  #cardiac arrest vs prolonged pause  #sinus jazz with 1st degree AV block vs intermittent nonconducting ? Aflutter   #Hypotension   - RRT called 5/1 for AMS and escalated to Code Blue  - 1 minute sinus pause, asystole   - 90 seconds to chest compressions, ROSC obtained, roughly 2 minutes downtime no meds given   - post cardiac arrest care  - monitor on telemetry  - patient DNR/DNI family would not want aggressive measures will likely transition to comfort care once back in town   - levophed held overnight for hypotension likely related to long cardiac pauses/ asystole s/p atropine x 2 doses overnight   - holding home meds: amlodipine 2.5 QD- holding       GASTROINTESTINAL:  #transaminitis  - , - trending down   - continue to trend  - avoid hepatotoxins    #Diet  - NPO except medications  - on D5+LR at 70   - family do not want aggressive measures- do not want feeding tube       RENAL/:  #UTI  #Ucx with GNR F/U final/ sensitivities   - UA with large leukocyte esterase, negative nitrites, 1834 RBC and 92 WBC  - cont zosyn  - see below for rest of infectious w/u  - TOV 3/2 at 11pm will lightly need straight cath vs indwelling choe       INFECTIOUS DISEASE:  #sepsis likely due to UTI   - UA with large leukocyte esterase, negative nitrites, 1834 RBC and 92 WBC  - Ucx with GNR F/U final/ sensitivities   - CXR without focal consolidation  - received vanc and zosyn in ED  - cont zosyn (4/1)-   -  blood cx NGTD      HEMATOLOGY:  #No active issues    #DVT prophylaxis  - [facility]      ENDOCRINE:  #severe hypothyroidism  - 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  - TSH 56.29 -> 49.13, FT4 and TT3 undetectable  - was given 100mg solumedrol in ED followed by 50mg 5/1  - recent change in her dose of Synthroid from 112 mcg to 25 mcg, unsure why  - d/c steroids   - start levothyroxine 75mcg IV daily, plan to transition to PO 100mcg   - repeat FT4 in am (5/4)   - endo following      LINES/DRAINS/TUBES/DEVICES:  - PIVs  - TOV attempted 5/2 11pm likely will need straight cath        ETHICS/DISPOSITION:  - Full code  - Admitted to MICU  - Palliative consult placed    89y/o Female with PMHx of hypothyroidism, HTN, HLD, cognitive decline, glaucoma (blind L eye), admitted for severe hypothyroidism and sepsis 2/2 UTI,  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     NEUROLOGY:  #altered mental status  - likely 2/2 severe hypothyroidism vs sepsis (unlikely)  - baseline A&Ox1 but conversational- now opening eyes not speaking or following commands   - CTH & CTA Head & Neck: No acute intracranial hemorrhage, mass effect, or midline shift. No large vessel occlusion, significant stenosis or vascular abnormality identified.  - w/u for other toxic/metabolic causes      PULMONARY:  #acute respiratory failure w hypercapnia  - on NC 2L  - CXR negative for focal consolidations  - patient DNR/DNI       CARDIOLOGY:  #cardiac arrest vs prolonged pause  #sinus jazz with 1st degree AV block vs intermittent nonconducting ? Aflutter   #Hypotension   - RRT called 5/1 for AMS and escalated to Code Blue  - 1 minute sinus pause, asystole   - 90 seconds to chest compressions, ROSC obtained, roughly 2 minutes downtime no meds given   - post cardiac arrest care  - monitor on telemetry  - patient DNR/DNI family would not want aggressive measures will likely transition to comfort care once back in town   - levophed held overnight for hypotension likely related to long cardiac pauses/ asystole s/p atropine x 2 doses overnight   - holding home meds: amlodipine 2.5 QD- holding       GASTROINTESTINAL:  #transaminitis  - , - trending down   - continue to trend  - avoid hepatotoxins    #Diet  - NPO except medications  - on D5+LR at 70   - family do not want aggressive measures- do not want feeding tube       RENAL/:  #UTI  #Ucx with GNR F/U final/ sensitivities   - UA with large leukocyte esterase, negative nitrites, 1834 RBC and 92 WBC  - cont zosyn  - see below for rest of infectious w/u  - TOV 3/2 at 11pm will lightly need straight cath vs indwelling choe       INFECTIOUS DISEASE:  #sepsis likely due to UTI   - UA with large leukocyte esterase, negative nitrites, 1834 RBC and 92 WBC  - Ucx with GNR F/U final/ sensitivities   - CXR without focal consolidation  - received vanc and zosyn in ED  - cont zosyn (4/1)-   -  blood cx NGTD      HEMATOLOGY:  #No active issues    #DVT prophylaxis  - [facility]      ENDOCRINE:  #severe hypothyroidism  - 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  - TSH 56.29 -> 49.13, FT4 and TT3 undetectable  - was given 100mg solumedrol in ED followed by 50mg 5/1  - recent change in her dose of Synthroid from 112 mcg to 25 mcg, unsure why  - d/c steroids   - start levothyroxine 75mcg IV daily, plan to transition to PO 100mcg   - repeat FT4 in am (5/4)   - endo following      LINES/DRAINS/TUBES/DEVICES:  - PIVs  - TOV attempted 5/2 11pm likely will need straight cath        ETHICS/DISPOSITION:  - Full code  - Admitted to MICU  - Palliative consult placed   - Spoke with HCP ana paula she is currently out of town but will be back tonight.  Wants to cont pressors if needed until she is able to see grandmother in hospital then will likely transition to comfort care.  No feeding tube

## 2025-05-03 NOTE — PROGRESS NOTE ADULT - ASSESSMENT
89y/o Female with PMHx of hypothyroidism, HTN, HLD, cognitive decline, glaucoma (blind L eye), admitted for severe hypothyroidism and sepsis 2/2 UTI,  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     NEUROLOGY:  #altered mental status  - likely 2/2 severe hypothyroidism vs sepsis (unlikely)  - baseline A&Ox1 but conversational  - CTH & CTA Head & Neck: No acute intracranial hemorrhage, mass effect, or midline shift. No large vessel occlusion, significant stenosis or vascular abnormality identified.  - w/u for other toxic/metabolic causes      PULMONARY:  #acute respiratory failure w hypercapnia  - on NC 2L  - CXR negative for focal consolidations  - patient DNR/DNI       CARDIOLOGY:  #cardiac arrest vs prolonged pause   - RRT called 5/1 for AMS and escalated to Code Blue  - 1 minute sinus pause, asystole   - 90 seconds to chest compressions, ROSC obtained, roughly 2 minutes downtime no meds given   - post cardiac arrest care  - monitor on telemetry  - patient DNR/DNI     #sinus bradycardia  - ECG sinus bradycardia with 1st degree AV block  - started epinephrine gtt 5/1, titrate to HR 60-70 for transport now off  - monitor on telemetry    #HTN  - home meds: amlodipine 2.5 QD- holding   - continue to monitor BP, goal <130/80      GASTROINTESTINAL:  #transaminitis  - , - trending down   - continue to trend  - avoid hepatotoxins    #Diet  - NPO except medications  - on D5+LR at 70       RENAL/:  #UTI  - UA with large leukocyte esterase, negative nitrites, 1834 RBC and 92 WBC  - started on zosyn  - see below for rest of infectious w/u  - TOV tonight 11pm       INFECTIOUS DISEASE:  #sepsis likely due to UTI   - UA with large leukocyte esterase, negative nitrites, 1834 RBC and 92 WBC  - CXR without focal consolidation  - lactate 1.4, WBC 3.73, hypothermic  - received vanc and zosyn in ED  - started zosyn (4/1)-   - trend temperatures and WBC  - f/u urine cx, blood cx NGTD      HEMATOLOGY:  #No active issues    #DVT prophylaxis  - [facility]      ENDOCRINE:  #severe hypothyroidism  - 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  - TSH 56.29 -> 49.13, FT4 and TT3 undetectable  - was given 100mg solumedrol in ED followed by 50mg 5/1  - recent change in her dose of Synthroid from 112 mcg to 25 mcg, unsure why  - d/c steroids   - start levothyroxine 75mcg IV daily, plan to transition to PO 100mcg   - repeat FT4 in 3 days (5/4)   - endo following      LINES/DRAINS/TUBES/DEVICES:  - PIVs  - choe- TOV tonight 11pm- bladder scan q 8       ETHICS/DISPOSITION:  - Full code  - Admitted to MICU  - Palliative consult placed

## 2025-05-04 NOTE — PROGRESS NOTE ADULT - SUBJECTIVE AND OBJECTIVE BOX
INTERVAL HPI/OVERNIGHT EVENTS: pt hypotensive, bradycardic this am, actively passing.    SUBJECTIVE: Patient seen and examined at bedside.       VITAL SIGNS:  ICU Vital Signs Last 24 Hrs  T(C): 35.3 (03 May 2025 20:00), Max: 36.6 (03 May 2025 12:00)  T(F): 95.6 (03 May 2025 20:00), Max: 97.9 (03 May 2025 12:00)  HR: 50 (03 May 2025 21:00) (40 - 75)  BP: 72/58 (03 May 2025 21:00) (72/58 - 130/77)  BP(mean): 63 (03 May 2025 21:00) (62 - 112)  ABP: --  ABP(mean): --  RR: 12 (03 May 2025 21:00) (8 - 22)  SpO2: 100% (03 May 2025 21:00) (98% - 100%)    O2 Parameters below as of 03 May 2025 20:02  Patient On (Oxygen Delivery Method): nasal cannula  O2 Flow (L/min): 2        05-03 @ 07:01  -  05-04 @ 07:00  --------------------------------------------------------  IN: 1656 mL / OUT: 570 mL / NET: 1086 mL      CAPILLARY BLOOD GLUCOSE      POCT Blood Glucose.: 95 mg/dL (03 May 2025 05:43)    PHYSICAL EXAM:  GENERAL: not following commands  HEAD:  Atraumatic, Normocephalic  EYES: EOMI, PERRLA, conjunctiva and sclera clear  ENMT:  Moist mucous membranes  NECK: Supple, No JVD,  CHEST/LUNG: Clear to auscultation bilaterally; No rales, rhonchi, wheezing, or rubs  HEART: bradycardic; No murmurs, rubs, or gallops  ABDOMEN: Soft, Nontender, Nondistended; Bowel sounds present  NEURO: opening eyes not speaking or following commands   EXTREMITIES: No LE edema, no calf tenderness  LYMPH: No lymphadenopathy noted  SKIN: No rashes or lesions    MEDICATIONS:  MEDICATIONS  (STANDING):  chlorhexidine 2% Cloths 1 Application(s) Topical daily  dextrose 5% + lactated ringers. 1000 milliLiter(s) (70 mL/Hr) IV Continuous <Continuous>  latanoprost 0.005% Ophthalmic Solution 1 Drop(s) Both EYES at bedtime  timolol 0.25% Solution 1 Drop(s) Both EYES daily    MEDICATIONS  (PRN):  acetaminophen     Tablet .. 650 milliGRAM(s) Oral every 6 hours PRN Temp greater or equal to 38C (100.4F), Mild Pain (1 - 3)  glycopyrrolate Injectable 0.2 milliGRAM(s) IV Push every 8 hours PRN Secretions  HYDROmorphone  Injectable 0.5 milliGRAM(s) IV Push every 4 hours PRN Pain  LORazepam   Injectable 1 milliGRAM(s) IV Push every 4 hours PRN Anxiety      ALLERGIES:  Allergies    No Known Allergies    Intolerances        LABS:                        9.2    4.10  )-----------( 140      ( 03 May 2025 00:00 )             29.0     05-03    146[H]  |  112[H]  |  32[H]  ----------------------------<  154[H]  4.6   |  22  |  1.24    Ca    8.7      03 May 2025 00:00  Phos  3.0     05-03  Mg     2.00     05-03    TPro  5.8[L]  /  Alb  2.7[L]  /  TBili  <0.2  /  DBili  x   /  AST  56[H]  /  ALT  173[H]  /  AlkPhos  110  05-03    PT/INR - ( 03 May 2025 00:00 )   PT: 11.5 sec;   INR: 0.99 ratio           Urinalysis Basic - ( 03 May 2025 00:00 )    Color: x / Appearance: x / SG: x / pH: x  Gluc: 154 mg/dL / Ketone: x  / Bili: x / Urobili: x   Blood: x / Protein: x / Nitrite: x   Leuk Esterase: x / RBC: x / WBC x   Sq Epi: x / Non Sq Epi: x / Bacteria: x        RADIOLOGY & ADDITIONAL TESTS: Reviewed.

## 2025-05-04 NOTE — PROGRESS NOTE ADULT - ASSESSMENT
89y/o Female with PMHx of hypothyroidism, HTN, HLD, cognitive decline, glaucoma (blind L eye), admitted for severe hypothyroidism and sepsis 2/2 UTI,  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     NEUROLOGY:  #altered mental status  - likely 2/2 severe hypothyroidism vs sepsis (unlikely)  - baseline A&Ox1 but conversational- now opening eyes not speaking or following commands   - CTH & CTA Head & Neck: No acute intracranial hemorrhage, mass effect, or midline shift. No large vessel occlusion, significant stenosis or vascular abnormality identified.  - w/u for other toxic/metabolic causes      PULMONARY:  #acute respiratory failure w hypercapnia  - on NC 2L  - CXR negative for focal consolidations  - patient DNR/DNI       CARDIOLOGY:  #cardiac arrest vs prolonged pause  #sinus jazz with 1st degree AV block vs intermittent nonconducting ? Aflutter   #Hypotension   - RRT called 5/1 for AMS and escalated to Code Blue  - 1 minute sinus pause, asystole   - 90 seconds to chest compressions, ROSC obtained, roughly 2 minutes downtime no meds given   - post cardiac arrest care  - monitor on telemetry  - patient DNR/DNI family would not want aggressive measures will likely transition to comfort care once back in town   - levophed held overnight for hypotension likely related to long cardiac pauses/ asystole s/p atropine x 2 doses overnight   - holding home meds: amlodipine 2.5 QD- holding       GASTROINTESTINAL:  #transaminitis  - , - trending down   - continue to trend  - avoid hepatotoxins    #Diet  - NPO except medications  - on D5+LR at 70   - family do not want aggressive measures- do not want feeding tube       RENAL/:  #UTI  #Ucx with GNR F/U final/ sensitivities   - UA with large leukocyte esterase, negative nitrites, 1834 RBC and 92 WBC  - cont zosyn  - see below for rest of infectious w/u  - TOV 3/2 at 11pm will lightly need straight cath vs indwelling choe       INFECTIOUS DISEASE:  #sepsis likely due to UTI   - UA with large leukocyte esterase, negative nitrites, 1834 RBC and 92 WBC  - Ucx with GNR F/U final/ sensitivities   - CXR without focal consolidation  - received vanc and zosyn in ED  - cont zosyn (4/1)-   -  blood cx NGTD      HEMATOLOGY:  #No active issues    #DVT prophylaxis  - SQH      ENDOCRINE:  #severe hypothyroidism  - 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  - TSH 56.29 -> 49.13, FT4 and TT3 undetectable  - was given 100mg solumedrol in ED followed by 50mg 5/1  - recent change in her dose of Synthroid from 112 mcg to 25 mcg, unsure why  - d/c steroids   - start levothyroxine 75mcg IV daily, plan to transition to PO 100mcg   - repeat FT4 in am (5/4)   - endo following      LINES/DRAINS/TUBES/DEVICES:  - PIVs  - TOV attempted 5/2 11pm likely will need straight cath        ETHICS/DISPOSITION:  - Full code  - Admitted to MICU  - Palliative consult placed   - Spoke with HCP ana paula she is currently out of town but will be back tonight.  Wants to cont pressors if needed until she is able to see grandmother in hospital then will likely transition to comfort care.  No feeding tube  89y/o Female with PMHx of hypothyroidism, HTN, HLD, cognitive decline, glaucoma (blind L eye), admitted for severe hypothyroidism and sepsis 2/2 UTI,  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     NEUROLOGY:  #altered mental status  - likely 2/2 severe hypothyroidism vs sepsis (unlikely)  - baseline A&Ox1 but conversational- now opening eyes not speaking or following commands   - CTH & CTA Head & Neck: No acute intracranial hemorrhage, mass effect, or midline shift. No large vessel occlusion, significant stenosis or vascular abnormality identified.  - comfort care measures onyl  - continue dilaudid and ativan prn      PULMONARY:  #acute respiratory failure w hypercapnia  - on NC 2L  - CXR negative for focal consolidations  - patient DNR/DNI , comfort measures only  - glycopyrrolate prn      CARDIOLOGY:  #cardiac arrest vs prolonged pause  #sinus jazz with 1st degree AV block vs intermittent nonconducting ? Aflutter   #Hypotension   - RRT called 5/1 for AMS and escalated to Code Blue  - 1 minute sinus pause, asystole   - 90 seconds to chest compressions, ROSC obtained, roughly 2 minutes downtime no meds given   - post cardiac arrest care  - monitor on telemetry  - patient DNR/DNI family would not want aggressive measures will likely transition to comfort care once back in town   - levophed held overnight for hypotension likely related to long cardiac pauses/ asystole s/p atropine x 2 doses overnight   - holding home meds: amlodipine 2.5 QD- holding   - patient DNR/DNI , comfort measures only      GASTROINTESTINAL:  #transaminitis  - , - trending down   - continue to trend  - avoid hepatotoxins  - patient DNR/DNI , comfort measures only    #Diet  - NPO  - family do not want aggressive measures- do not want feeding tube   - patient DNR/DNI , comfort measures only      RENAL/:  #UTI  #Ucx with GNR F/U final/ sensitivities   - UA with large leukocyte esterase, negative nitrites, 1834 RBC and 92 WBC  - s/p zosyn  - see below for rest of infectious w/u   - patient DNR/DNI , comfort measures only      INFECTIOUS DISEASE:  #sepsis due to ESBL UTI   - UA with large leukocyte esterase, negative nitrites, 1834 RBC and 92 WBC  - Ucx with GNR + ESBL ecoli  - CXR without focal consolidation  - received vanc and zosyn in ED  - s/p zosyn (4/1)-  - blood cx NGTD  - patient DNR/DNI , comfort measures only      HEMATOLOGY:  #No active issues    #DVT prophylaxis  - SQH      ENDOCRINE:  #severe hypothyroidism  - 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  - TSH 56.29 -> 49.13, FT4 and TT3 undetectable  - was given 100mg solumedrol in ED followed by 50mg 5/1  - recent change in her dose of Synthroid from 112 mcg to 25 mcg, unsure why  - d/c steroids   - started levothyroxine 75mcg IV daily, plan  was to transition to PO 100mcg per endo recs  - endo following  - patient DNR/DNI , comfort measures only      LINES/DRAINS/TUBES/DEVICES:  - PIVs      ETHICS/DISPOSITION:  - DNR/DNI , comfort measures only  - Admitted to MICU  - Palliative consult placed   - MICU team spoke with HCP ana paula overnight, see GOC note.

## 2025-05-04 NOTE — PROGRESS NOTE ADULT - NS ATTEND AMEND GEN_ALL_CORE FT
Patient is a 89 yo F w/ HTN, HLD, Hypothyroid who is admitted to MICU for septic shock 2/2 UTI as well as severe hypothyroidism/myxedema coma  in setting out downtitrating synthroid as outpatient with hospital course c/b sinus pause/cardiac arrest.    #UTI  #Septic Shock  #Cardiac arrest vs sinus pause - EKG with 1st degree AV block with intermittent ? Aflutter  #Myxedema coma/Hypothyroid  - Comfort measures only now  - c/w PRN robinul, ativan, dilaudid    #GOC - DNR/DNI    Antony Aguero MD  Pulmonary & Critical Care

## 2025-05-05 NOTE — DISCHARGE NOTE FOR THE EXPIRED PATIENT - SECONDARY DIAGNOSIS.
Bradycardia, severe sinus Severe hypothyroidism Sepsis Myxedema Infection due to extended spectrum beta lactamase (ESBL) producing Proteus mirabilis HTN (hypertension)

## 2025-05-05 NOTE — PROGRESS NOTE ADULT - ASSESSMENT
89y/o Female with PMHx of hypothyroidism, HTN, HLD, cognitive decline, glaucoma (blind L eye), admitted for severe hypothyroidism and sepsis 2/2 UTI,  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     NEUROLOGY:  #altered mental status  - likely 2/2 severe hypothyroidism vs sepsis (unlikely)  - baseline A&Ox1 but conversational- now opening eyes not speaking or following commands   - CTH & CTA Head & Neck: No acute intracranial hemorrhage, mass effect, or midline shift. No large vessel occlusion, significant stenosis or vascular abnormality identified.  - comfort care measures onyl  - continue dilaudid and ativan prn      PULMONARY:  #acute respiratory failure w hypercapnia  - on NC 2L  - CXR negative for focal consolidations  - patient DNR/DNI , comfort measures only  - glycopyrrolate prn      CARDIOLOGY:  #cardiac arrest vs prolonged pause  #sinus jazz with 1st degree AV block vs intermittent nonconducting ? Aflutter   #Hypotension   - RRT called 5/1 for AMS and escalated to Code Blue  - 1 minute sinus pause, asystole   - 90 seconds to chest compressions, ROSC obtained, roughly 2 minutes downtime no meds given   - post cardiac arrest care  - monitor on telemetry  - patient DNR/DNI family would not want aggressive measures will likely transition to comfort care once back in town   - levophed held overnight for hypotension likely related to long cardiac pauses/ asystole s/p atropine x 2 doses overnight   - holding home meds: amlodipine 2.5 QD- holding   - patient DNR/DNI , comfort measures only      GASTROINTESTINAL:  #transaminitis  - , - trending down   - continue to trend  - avoid hepatotoxins  - patient DNR/DNI , comfort measures only    #Diet  - NPO  - family do not want aggressive measures- do not want feeding tube   - patient DNR/DNI , comfort measures only      RENAL/:  #UTI  #Ucx with GNR F/U final/ sensitivities   - UA with large leukocyte esterase, negative nitrites, 1834 RBC and 92 WBC  - s/p zosyn  - see below for rest of infectious w/u   - patient DNR/DNI , comfort measures only      INFECTIOUS DISEASE:  #sepsis due to ESBL UTI   - UA with large leukocyte esterase, negative nitrites, 1834 RBC and 92 WBC  - Ucx with GNR + ESBL ecoli  - CXR without focal consolidation  - received vanc and zosyn in ED  - s/p zosyn (4/1)-  - blood cx NGTD  - patient DNR/DNI , comfort measures only      HEMATOLOGY:  #No active issues    #DVT prophylaxis  - SQH      ENDOCRINE:  #severe hypothyroidism  - 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  - TSH 56.29 -> 49.13, FT4 and TT3 undetectable  - was given 100mg solumedrol in ED followed by 50mg 5/1  - recent change in her dose of Synthroid from 112 mcg to 25 mcg, unsure why  - d/c steroids   - started levothyroxine 75mcg IV daily, plan  was to transition to PO 100mcg per endo recs  - endo following  - patient DNR/DNI , comfort measures only      LINES/DRAINS/TUBES/DEVICES:  - PIVs      ETHICS/DISPOSITION:  - DNR/DNI , comfort measures only  - Admitted to MICU  - Palliative consult placed   - MICU team spoke with HCP ana paula overnight, see GOC note.

## 2025-05-05 NOTE — PROGRESS NOTE ADULT - SUBJECTIVE AND OBJECTIVE BOX
Significant recent/past 24 hr events:    Subjective:    Review of Systems         [ ] A ten-point review of systems was otherwise negative except as noted.  [ ] Due to altered mental status/intubation, subjective information were not able to be obtained from the patient. History was obtained, to the extent possible, from review of the chart and collateral sources of information.      Patient is a 89y old  Female who presents with a chief complaint of AMS, Severe hypothyroidism (04 May 2025 07:13)    HPI:  89F with PMH hypothyroidism, HTN, HLD, mild cognitive decline, glaucoma (blind L eye), and h/o decubitus ulcer, who presents with AMS. Patient is a resident in a nursing home since 10/2024 and is non-ambulatory. Per ED report, her baseline mental status is A&O x1 (person) but she is typically conversational. Per family, patient has had a change in mental status from her baseline as she did not recognize them, and her speech was altered. She followed commands inconsistently, which prompted presentation to the ED. Patient had a change in her dose of Synthroid from 112 mcg to 25 mcg based on nursing home paperwork. She was discharged on 112 mcg dose during hospitalization from 6/9/2024 to 6/12/2024 with a TSH of 0.357 on 6/11/2024.     In the ED, patient was found to be A&O x1 (Name), Hypothermia (T 32.8C), and Bradycardia (40’s). Labs notable for TSH 56.29, transaminitis (, , Alk Phos 146), and acidosis on VBG. Patient evaluated by endocrinology and MICU and given p IV levothyroxine 100mcg and Hydrocortisone 100 mg IV for potential myxedema coma although they had low suspicion for it.    (01 May 2025 01:32)    PAST MEDICAL & SURGICAL HISTORY:  HTN (hypertension)      Hypothyroidism      COVID-19 vaccine series completed  w booster,      HLD (hyperlipidemia)      No significant past surgical history        FAMILY HISTORY:      Vitals   ICU Vital Signs Last 24 Hrs  T(C): 36.1 (04 May 2025 20:00), Max: 36.1 (04 May 2025 20:00)  T(F): 97 (04 May 2025 20:00), Max: 97 (04 May 2025 20:00)  HR: 37 (04 May 2025 20:00) (37 - 39)  BP: 61/39 (04 May 2025 20:00) (54/42 - 61/39)  BP(mean): 47 (04 May 2025 20:00) (47 - 48)  ABP: --  ABP(mean): --  RR: 6 (04 May 2025 20:00) (6 - 6)  SpO2: 98% (04 May 2025 20:00) (98% - 100%)    O2 Parameters below as of 04 May 2025 20:00  Patient On (Oxygen Delivery Method): nasal cannula  O2 Flow (L/min): 2          Physical Exam:   Constitutional: NAD, well-groomed, well-developed  HEENT: PERRLA, EOMI, no drainage or redness  Neck: supple,  No JVD, Trachea midline  Back: Normal spine flexure, No CVA tenderness, No deformity or limitation of movement  Respiratory: Breath Sounds equal & clear bilaterally to auscultation, no accessory muscle use noted  Cardiovascular: Regular rate, regular rhythm, normal S1, S2; no murmurs or rub  Gastrointestinal: Soft, non-tender, non distended, no hepatosplenomegaly, normal bowel sounds  Extremities: BUCK x 4, no peripheral edema, no cyanosis, no clubbing   Vascular: Equal and normal pulses: 2+ peripheral pulses throughout  Neurological: A+O x 3; speech clear and intact; no sensory, motor  deficits, normal reflexes  Psychiatric: calm, normal mood, normal affect  Musculoskeletal: No joint swelling or deformity; no limitation of movement  Skin: warm, dry, well perfused, no rashes    VENT SETTINGS         I&O's Detail    04 May 2025 07:01  -  05 May 2025 07:00  --------------------------------------------------------  IN:    dextrose 5% + lactated ringers: 490 mL  Total IN: 490 mL    OUT:  Total OUT: 0 mL    Total NET: 490 mL          LABS                            MEDICATIONS  (STANDING):  chlorhexidine 2% Cloths 1 Application(s) Topical daily  dextrose 5% + lactated ringers. 1000 milliLiter(s) (50 mL/Hr) IV Continuous <Continuous>    MEDICATIONS  (PRN):  acetaminophen     Tablet .. 650 milliGRAM(s) Oral every 6 hours PRN Temp greater or equal to 38C (100.4F), Mild Pain (1 - 3)  glycopyrrolate Injectable 0.2 milliGRAM(s) IV Push every 8 hours PRN Secretions  HYDROmorphone  Injectable 0.5 milliGRAM(s) IV Push every 4 hours PRN Pain  LORazepam   Injectable 1 milliGRAM(s) IV Push every 4 hours PRN Anxiety      Allergies:  No Known Allergies        CRITICAL CARE TIME SPENT:  minutes of critical care time spent providing medical care for patient's acute illness/conditions that impairs at least one vital organ system and/or poses a high risk of imminent or life threatening deterioration in the patient's condition. It includes time spent evaluating and treating the patient's acute illness as well as time spent reviewing labs, radiology, discussing goals of care with patient and/or patient's family, and discussing the case with a multidisciplinary team, in an effort to prevent further life threatening deterioration or end organ damage. This time is independent of any procedures performed.

## 2025-05-05 NOTE — DISCHARGE NOTE FOR THE EXPIRED PATIENT - HOSPITAL COURSE
Patient is a 89 yo F w/ HTN, HLD, Hypothyroid who is admitted to MICU for septic shock 2/2 UTI as well as severe hypothyroidism/myxedema coma  in setting of downtitrating synthroid as outpatient  Patient was found to have TSH 56 with undetectable FT4, TT3 with normal PM cortisol c/f myxedema coma. Patient had a change in her dose of Synthroid from 112 mcg to 25 mcg as of 4/28/25 based on nursing home paperwork, however it is unclear why there was a dose change. Pt was treated with stress dose steroids and IV levothyroxine 100 >75 mcg; with hospital course c/b sinus pause/cardiac arrest. As per family's wishes, pt DNR/DNI and comfort measures only.     EXPIRATION NOTE     Called to the bedside in location by nursing staff to evaluate the status of patient. Identity was confirmed by wrist band.   The following person(s) were in the room during examination: me and nursing staff     Physical Examination:  No signs of respiratory effort: No spontaneous respirations, No respiratory sounds after 5 minutes of auscultation  No response to verbal stimuli: eyes remained closed, no facial changes or sounds made by patient   No response to painful stimuli: nonresponsive sternal rub, corneal reflex absent  No pupillary response to light: fixed and dilated  No central pulse: carotid, radial, femoral evaluated   No heart sounds after 5 minutes of auscultation; EKG rhythm strip shows asystole.        Patient pronounced dead at 7:48 am.  Attending notified.  Family was notified at 7:48 am, Granddaughter Cata __ See above.

## 2025-05-05 NOTE — PROGRESS NOTE ADULT - REASON FOR ADMISSION
AMS, Severe hypothyroidism

## 2025-05-06 LAB
CULTURE RESULTS: SIGNIFICANT CHANGE UP
CULTURE RESULTS: SIGNIFICANT CHANGE UP
SPECIMEN SOURCE: SIGNIFICANT CHANGE UP
SPECIMEN SOURCE: SIGNIFICANT CHANGE UP